# Patient Record
Sex: FEMALE | Race: WHITE | NOT HISPANIC OR LATINO | Employment: OTHER | URBAN - METROPOLITAN AREA
[De-identification: names, ages, dates, MRNs, and addresses within clinical notes are randomized per-mention and may not be internally consistent; named-entity substitution may affect disease eponyms.]

---

## 2017-07-27 ENCOUNTER — APPOINTMENT (OUTPATIENT)
Dept: GENERAL RADIOLOGY | Facility: HOSPITAL | Age: 82
End: 2017-07-27

## 2017-07-27 ENCOUNTER — HOSPITAL ENCOUNTER (OUTPATIENT)
Facility: HOSPITAL | Age: 82
Setting detail: OBSERVATION
Discharge: HOME | End: 2017-07-30
Attending: FAMILY MEDICINE | Admitting: HOSPITALIST

## 2017-07-27 DIAGNOSIS — I50.21 ACUTE SYSTOLIC HEART FAILURE (HCC): Primary | ICD-10-CM

## 2017-07-27 PROBLEM — R06.01 ORTHOPNEA: Status: ACTIVE | Noted: 2017-07-27

## 2017-07-27 PROBLEM — N18.30 CKD (CHRONIC KIDNEY DISEASE) STAGE 3, GFR 30-59 ML/MIN (HCC): Chronic | Status: ACTIVE | Noted: 2017-07-27

## 2017-07-27 PROBLEM — I44.7 BUNDLE BRANCH BLOCK, LEFT: Chronic | Status: ACTIVE | Noted: 2017-07-27

## 2017-07-27 PROBLEM — I10 ESSENTIAL HYPERTENSION: Chronic | Status: ACTIVE | Noted: 2017-07-27

## 2017-07-27 PROBLEM — E03.9 HYPOTHYROIDISM: Status: ACTIVE | Noted: 2017-07-27

## 2017-07-27 PROBLEM — I50.9 ACUTE HEART FAILURE (HCC): Status: ACTIVE | Noted: 2017-07-27

## 2017-07-27 LAB
ALBUMIN SERPL-MCNC: 4.2 G/DL (ref 3.2–4.8)
ALBUMIN/GLOB SERPL: 1.5 G/DL (ref 1.5–2.5)
ALP SERPL-CCNC: 73 U/L (ref 25–100)
ALT SERPL W P-5'-P-CCNC: 20 U/L (ref 7–40)
ANION GAP SERPL CALCULATED.3IONS-SCNC: 8 MMOL/L (ref 3–11)
AST SERPL-CCNC: 29 U/L (ref 0–33)
BILIRUB SERPL-MCNC: 2.2 MG/DL (ref 0.3–1.2)
BILIRUB UR QL STRIP: NEGATIVE
BNP SERPL-MCNC: 1067 PG/ML (ref 0–100)
BUN BLD-MCNC: 15 MG/DL (ref 9–23)
BUN/CREAT SERPL: 13.6 (ref 7–25)
CALCIUM SPEC-SCNC: 9.2 MG/DL (ref 8.7–10.4)
CHLORIDE SERPL-SCNC: 106 MMOL/L (ref 99–109)
CLARITY UR: CLEAR
CO2 SERPL-SCNC: 26 MMOL/L (ref 20–31)
COLOR UR: YELLOW
CREAT BLD-MCNC: 1.1 MG/DL (ref 0.6–1.3)
GFR SERPL CREATININE-BSD FRML MDRD: 48 ML/MIN/1.73
GLOBULIN UR ELPH-MCNC: 2.8 GM/DL
GLUCOSE BLD-MCNC: 99 MG/DL (ref 70–100)
GLUCOSE UR STRIP-MCNC: NEGATIVE MG/DL
HGB UR QL STRIP.AUTO: NEGATIVE
INR PPP: 1
KETONES UR QL STRIP: NEGATIVE
LEUKOCYTE ESTERASE UR QL STRIP.AUTO: NEGATIVE
NITRITE UR QL STRIP: NEGATIVE
PH UR STRIP.AUTO: 7 [PH] (ref 5–8)
POTASSIUM BLD-SCNC: 4 MMOL/L (ref 3.5–5.5)
PROCALCITONIN SERPL-MCNC: <0.05 NG/ML
PROT SERPL-MCNC: 7 G/DL (ref 5.7–8.2)
PROT UR QL STRIP: NEGATIVE
PROTHROMBIN TIME: 10.9 SECONDS (ref 9.6–11.5)
SODIUM BLD-SCNC: 140 MMOL/L (ref 132–146)
SP GR UR STRIP: <=1.005 (ref 1–1.03)
TROPONIN I SERPL-MCNC: 0.01 NG/ML
TROPONIN I SERPL-MCNC: 0.01 NG/ML
UROBILINOGEN UR QL STRIP: NORMAL

## 2017-07-27 PROCEDURE — 81003 URINALYSIS AUTO W/O SCOPE: CPT | Performed by: FAMILY MEDICINE

## 2017-07-27 PROCEDURE — 93010 ELECTROCARDIOGRAM REPORT: CPT | Performed by: INTERNAL MEDICINE

## 2017-07-27 PROCEDURE — 71010 HC CHEST PA OR AP: CPT

## 2017-07-27 PROCEDURE — G0378 HOSPITAL OBSERVATION PER HR: HCPCS

## 2017-07-27 PROCEDURE — 84145 PROCALCITONIN (PCT): CPT | Performed by: FAMILY MEDICINE

## 2017-07-27 PROCEDURE — 99222 1ST HOSP IP/OBS MODERATE 55: CPT | Performed by: FAMILY MEDICINE

## 2017-07-27 PROCEDURE — 80053 COMPREHEN METABOLIC PANEL: CPT | Performed by: FAMILY MEDICINE

## 2017-07-27 PROCEDURE — 83880 ASSAY OF NATRIURETIC PEPTIDE: CPT | Performed by: FAMILY MEDICINE

## 2017-07-27 PROCEDURE — 84484 ASSAY OF TROPONIN QUANT: CPT | Performed by: FAMILY MEDICINE

## 2017-07-27 PROCEDURE — 96374 THER/PROPH/DIAG INJ IV PUSH: CPT

## 2017-07-27 PROCEDURE — 93005 ELECTROCARDIOGRAM TRACING: CPT | Performed by: FAMILY MEDICINE

## 2017-07-27 PROCEDURE — 25010000002 FUROSEMIDE PER 20 MG: Performed by: FAMILY MEDICINE

## 2017-07-27 PROCEDURE — 85610 PROTHROMBIN TIME: CPT | Performed by: FAMILY MEDICINE

## 2017-07-27 RX ORDER — PROMETHAZINE HYDROCHLORIDE 25 MG/ML
12.5 INJECTION, SOLUTION INTRAMUSCULAR; INTRAVENOUS EVERY 6 HOURS PRN
Status: DISCONTINUED | OUTPATIENT
Start: 2017-07-27 | End: 2017-07-30 | Stop reason: HOSPADM

## 2017-07-27 RX ORDER — ESTRADIOL 1 MG/1
1 TABLET ORAL DAILY
COMMUNITY

## 2017-07-27 RX ORDER — AMLODIPINE BESYLATE 10 MG/1
10 TABLET ORAL DAILY
COMMUNITY
End: 2017-07-30 | Stop reason: HOSPADM

## 2017-07-27 RX ORDER — BISACODYL 10 MG
10 SUPPOSITORY, RECTAL RECTAL DAILY PRN
Status: DISCONTINUED | OUTPATIENT
Start: 2017-07-27 | End: 2017-07-30 | Stop reason: HOSPADM

## 2017-07-27 RX ORDER — PROMETHAZINE HYDROCHLORIDE 12.5 MG/1
12.5 TABLET ORAL EVERY 6 HOURS PRN
Status: DISCONTINUED | OUTPATIENT
Start: 2017-07-27 | End: 2017-07-30 | Stop reason: HOSPADM

## 2017-07-27 RX ORDER — HYOSCYAMINE SULFATE EXTENDED-RELEASE 0.38 MG/1
375 TABLET ORAL EVERY 12 HOURS PRN
Status: DISCONTINUED | OUTPATIENT
Start: 2017-07-27 | End: 2017-07-30 | Stop reason: HOSPADM

## 2017-07-27 RX ORDER — LABETALOL 200 MG/1
200 TABLET, FILM COATED ORAL 2 TIMES DAILY
COMMUNITY
End: 2017-07-30 | Stop reason: HOSPADM

## 2017-07-27 RX ORDER — LEVOTHYROXINE SODIUM 112 UG/1
112 TABLET ORAL
Status: DISCONTINUED | OUTPATIENT
Start: 2017-07-28 | End: 2017-07-30 | Stop reason: HOSPADM

## 2017-07-27 RX ORDER — ESTRADIOL 0.5 MG/1
1 TABLET ORAL DAILY
Status: DISCONTINUED | OUTPATIENT
Start: 2017-07-27 | End: 2017-07-30 | Stop reason: HOSPADM

## 2017-07-27 RX ORDER — POTASSIUM CHLORIDE 750 MG/1
10 TABLET, FILM COATED, EXTENDED RELEASE ORAL 2 TIMES DAILY
COMMUNITY
End: 2017-08-04 | Stop reason: DRUGHIGH

## 2017-07-27 RX ORDER — CALCIUM CARBONATE 200(500)MG
2 TABLET,CHEWABLE ORAL 2 TIMES DAILY PRN
Status: DISCONTINUED | OUTPATIENT
Start: 2017-07-27 | End: 2017-07-30 | Stop reason: HOSPADM

## 2017-07-27 RX ORDER — BISACODYL 5 MG/1
5 TABLET, DELAYED RELEASE ORAL DAILY PRN
Status: DISCONTINUED | OUTPATIENT
Start: 2017-07-27 | End: 2017-07-30 | Stop reason: HOSPADM

## 2017-07-27 RX ORDER — CARVEDILOL 3.12 MG/1
3.12 TABLET ORAL 2 TIMES DAILY WITH MEALS
Status: DISCONTINUED | OUTPATIENT
Start: 2017-07-27 | End: 2017-07-29

## 2017-07-27 RX ORDER — TRIAMTERENE AND HYDROCHLOROTHIAZIDE 37.5; 25 MG/1; MG/1
1 TABLET ORAL DAILY
COMMUNITY
End: 2017-09-08 | Stop reason: HOSPADM

## 2017-07-27 RX ORDER — LEVOTHYROXINE SODIUM 112 UG/1
88 TABLET ORAL DAILY
COMMUNITY
End: 2019-03-27

## 2017-07-27 RX ORDER — FUROSEMIDE 10 MG/ML
20 INJECTION INTRAMUSCULAR; INTRAVENOUS ONCE
Status: COMPLETED | OUTPATIENT
Start: 2017-07-27 | End: 2017-07-27

## 2017-07-27 RX ORDER — HYOSCYAMINE SULFATE EXTENDED-RELEASE 0.38 MG/1
0.38 TABLET ORAL DAILY
Status: ON HOLD | COMMUNITY
End: 2017-09-06

## 2017-07-27 RX ORDER — ACETAMINOPHEN 325 MG/1
650 TABLET ORAL EVERY 6 HOURS PRN
Status: DISCONTINUED | OUTPATIENT
Start: 2017-07-27 | End: 2017-07-30 | Stop reason: HOSPADM

## 2017-07-27 RX ORDER — PROMETHAZINE HYDROCHLORIDE 12.5 MG/1
12.5 SUPPOSITORY RECTAL EVERY 6 HOURS PRN
Status: DISCONTINUED | OUTPATIENT
Start: 2017-07-27 | End: 2017-07-30 | Stop reason: HOSPADM

## 2017-07-27 RX ORDER — SODIUM CHLORIDE 0.9 % (FLUSH) 0.9 %
1-10 SYRINGE (ML) INJECTION AS NEEDED
Status: DISCONTINUED | OUTPATIENT
Start: 2017-07-27 | End: 2017-07-30 | Stop reason: HOSPADM

## 2017-07-27 RX ORDER — AMLODIPINE BESYLATE 10 MG/1
10 TABLET ORAL DAILY
Status: DISCONTINUED | OUTPATIENT
Start: 2017-07-27 | End: 2017-07-29

## 2017-07-27 RX ADMIN — ESTRADIOL 1 MG: 0.5 TABLET ORAL at 17:59

## 2017-07-27 RX ADMIN — CARVEDILOL 3.12 MG: 3.12 TABLET, FILM COATED ORAL at 17:52

## 2017-07-27 RX ADMIN — FUROSEMIDE 20 MG: 10 INJECTION, SOLUTION INTRAMUSCULAR; INTRAVENOUS at 17:52

## 2017-07-27 RX ADMIN — AMLODIPINE BESYLATE 10 MG: 10 TABLET ORAL at 17:52

## 2017-07-28 ENCOUNTER — APPOINTMENT (OUTPATIENT)
Dept: CARDIOLOGY | Facility: HOSPITAL | Age: 82
End: 2017-07-28
Attending: FAMILY MEDICINE

## 2017-07-28 LAB
ANION GAP SERPL CALCULATED.3IONS-SCNC: 5 MMOL/L (ref 3–11)
ARTICHOKE IGE QN: 63 MG/DL (ref 0–130)
BASOPHILS # BLD AUTO: 0.07 10*3/MM3 (ref 0–0.2)
BASOPHILS NFR BLD AUTO: 0.9 % (ref 0–1)
BH CV ECHO MEAS - AO MAX PG (FULL): 9.1 MMHG
BH CV ECHO MEAS - AO MAX PG: 12.5 MMHG
BH CV ECHO MEAS - AO MEAN PG (FULL): 4 MMHG
BH CV ECHO MEAS - AO MEAN PG: 6 MMHG
BH CV ECHO MEAS - AO ROOT AREA (BSA CORRECTED): 1.7
BH CV ECHO MEAS - AO ROOT AREA: 5.3 CM^2
BH CV ECHO MEAS - AO ROOT DIAM: 2.6 CM
BH CV ECHO MEAS - AO V2 MAX: 176.5 CM/SEC
BH CV ECHO MEAS - AO V2 MEAN: 114 CM/SEC
BH CV ECHO MEAS - AO V2 VTI: 41.8 CM
BH CV ECHO MEAS - AVA(I,A): 1.7 CM^2
BH CV ECHO MEAS - AVA(I,D): 1.7 CM^2
BH CV ECHO MEAS - AVA(V,A): 1.7 CM^2
BH CV ECHO MEAS - AVA(V,D): 1.7 CM^2
BH CV ECHO MEAS - BSA(HAYCOCK): 1.5 M^2
BH CV ECHO MEAS - BSA: 1.5 M^2
BH CV ECHO MEAS - BZI_BMI: 21.9 KILOGRAMS/M^2
BH CV ECHO MEAS - BZI_METRIC_HEIGHT: 157.5 CM
BH CV ECHO MEAS - BZI_METRIC_WEIGHT: 54.4 KG
BH CV ECHO MEAS - CONTRAST EF 4CH: 30.4 ML/M^2
BH CV ECHO MEAS - EDV(CUBED): 139.8 ML
BH CV ECHO MEAS - EDV(MOD-SP4): 138 ML
BH CV ECHO MEAS - EDV(TEICH): 128.9 ML
BH CV ECHO MEAS - EF(CUBED): 41.9 %
BH CV ECHO MEAS - EF(MOD-SP4): 30.4 %
BH CV ECHO MEAS - EF(TEICH): 34.5 %
BH CV ECHO MEAS - ESV(CUBED): 81.2 ML
BH CV ECHO MEAS - ESV(MOD-SP4): 96 ML
BH CV ECHO MEAS - ESV(TEICH): 84.4 ML
BH CV ECHO MEAS - FS: 16.6 %
BH CV ECHO MEAS - IVS/LVPW: 0.93
BH CV ECHO MEAS - IVSD: 0.88 CM
BH CV ECHO MEAS - LA DIMENSION: 4.1 CM
BH CV ECHO MEAS - LA/AO: 1.6
BH CV ECHO MEAS - LV DIASTOLIC VOL/BSA (35-75): 89.7 ML/M^2
BH CV ECHO MEAS - LV MASS(C)D: 171.1 GRAMS
BH CV ECHO MEAS - LV MASS(C)DI: 111.2 GRAMS/M^2
BH CV ECHO MEAS - LV MAX PG: 3.4 MMHG
BH CV ECHO MEAS - LV MEAN PG: 2 MMHG
BH CV ECHO MEAS - LV SYSTOLIC VOL/BSA (12-30): 62.4 ML/M^2
BH CV ECHO MEAS - LV V1 MAX: 92.8 CM/SEC
BH CV ECHO MEAS - LV V1 MEAN: 57.9 CM/SEC
BH CV ECHO MEAS - LV V1 VTI: 23.1 CM
BH CV ECHO MEAS - LVIDD: 5.2 CM
BH CV ECHO MEAS - LVIDS: 4.3 CM
BH CV ECHO MEAS - LVLD AP4: 7.6 CM
BH CV ECHO MEAS - LVLS AP4: 7.4 CM
BH CV ECHO MEAS - LVOT AREA (M): 3.1 CM^2
BH CV ECHO MEAS - LVOT AREA: 3.1 CM^2
BH CV ECHO MEAS - LVOT DIAM: 2 CM
BH CV ECHO MEAS - LVPWD: 0.94 CM
BH CV ECHO MEAS - MR ALIAS VEL: 33.5 CM/SEC
BH CV ECHO MEAS - MR ERO: 0.19 CM^2
BH CV ECHO MEAS - MR FLOW RATE: 103.1 CM^3/SEC
BH CV ECHO MEAS - MR MAX PG: 114.5 MMHG
BH CV ECHO MEAS - MR MAX VEL: 535 CM/SEC
BH CV ECHO MEAS - MR MEAN PG: 73 MMHG
BH CV ECHO MEAS - MR MEAN VEL: 406 CM/SEC
BH CV ECHO MEAS - MR PISA RADIUS: 0.7 CM
BH CV ECHO MEAS - MR PISA: 3.1 CM^2
BH CV ECHO MEAS - MR VOLUME: 41.1 ML
BH CV ECHO MEAS - MR VTI: 213 CM
BH CV ECHO MEAS - MV A MAX VEL: 77.9 CM/SEC
BH CV ECHO MEAS - MV DEC TIME: 0.13 SEC
BH CV ECHO MEAS - MV E MAX VEL: 116 CM/SEC
BH CV ECHO MEAS - MV E/A: 1.5
BH CV ECHO MEAS - PA ACC SLOPE: 860 CM/SEC^2
BH CV ECHO MEAS - PA ACC TIME: 0.08 SEC
BH CV ECHO MEAS - PA MAX PG: 3.3 MMHG
BH CV ECHO MEAS - PA PR(ACCEL): 44.4 MMHG
BH CV ECHO MEAS - PA V2 MAX: 89.6 CM/SEC
BH CV ECHO MEAS - PI END-D VEL: 124 CM/SEC
BH CV ECHO MEAS - RAP SYSTOLE: 8 MMHG
BH CV ECHO MEAS - RVDD: 1.5 CM
BH CV ECHO MEAS - RVSP: 45 MMHG
BH CV ECHO MEAS - SI(AO): 144.3 ML/M^2
BH CV ECHO MEAS - SI(CUBED): 38.1 ML/M^2
BH CV ECHO MEAS - SI(LVOT): 47.2 ML/M^2
BH CV ECHO MEAS - SI(MOD-SP4): 27.3 ML/M^2
BH CV ECHO MEAS - SI(TEICH): 28.9 ML/M^2
BH CV ECHO MEAS - SV(AO): 221.9 ML
BH CV ECHO MEAS - SV(CUBED): 58.6 ML
BH CV ECHO MEAS - SV(LVOT): 72.6 ML
BH CV ECHO MEAS - SV(MOD-SP4): 42 ML
BH CV ECHO MEAS - SV(TEICH): 44.5 ML
BH CV ECHO MEAS - TR MAX VEL: 305 CM/SEC
BH CV XLRA - RV BASE: 3.3 CM
BH CV XLRA - RV LENGTH: 4.9 CM
BH CV XLRA - RV MID: 2.7 CM
BUN BLD-MCNC: 16 MG/DL (ref 9–23)
BUN/CREAT SERPL: 14.5 (ref 7–25)
CALCIUM SPEC-SCNC: 8.6 MG/DL (ref 8.7–10.4)
CHLORIDE SERPL-SCNC: 108 MMOL/L (ref 99–109)
CHOLEST SERPL-MCNC: 158 MG/DL (ref 0–200)
CO2 SERPL-SCNC: 26 MMOL/L (ref 20–31)
CREAT BLD-MCNC: 1.1 MG/DL (ref 0.6–1.3)
DEPRECATED RDW RBC AUTO: 51.8 FL (ref 37–54)
EOSINOPHIL # BLD AUTO: 0.26 10*3/MM3 (ref 0–0.3)
EOSINOPHIL NFR BLD AUTO: 3.4 % (ref 0–3)
ERYTHROCYTE [DISTWIDTH] IN BLOOD BY AUTOMATED COUNT: 13.9 % (ref 11.3–14.5)
GFR SERPL CREATININE-BSD FRML MDRD: 48 ML/MIN/1.73
GLUCOSE BLD-MCNC: 95 MG/DL (ref 70–100)
HCT VFR BLD AUTO: 34.6 % (ref 34.5–44)
HDLC SERPL-MCNC: 72 MG/DL (ref 40–60)
HGB BLD-MCNC: 11.6 G/DL (ref 11.5–15.5)
IMM GRANULOCYTES # BLD: 0.01 10*3/MM3 (ref 0–0.03)
IMM GRANULOCYTES NFR BLD: 0.1 % (ref 0–0.6)
LYMPHOCYTES # BLD AUTO: 1.96 10*3/MM3 (ref 0.6–4.8)
LYMPHOCYTES NFR BLD AUTO: 25.7 % (ref 24–44)
MCH RBC QN AUTO: 34.2 PG (ref 27–31)
MCHC RBC AUTO-ENTMCNC: 33.5 G/DL (ref 32–36)
MCV RBC AUTO: 102.1 FL (ref 80–99)
MONOCYTES # BLD AUTO: 0.81 10*3/MM3 (ref 0–1)
MONOCYTES NFR BLD AUTO: 10.6 % (ref 0–12)
NEUTROPHILS # BLD AUTO: 4.52 10*3/MM3 (ref 1.5–8.3)
NEUTROPHILS NFR BLD AUTO: 59.3 % (ref 41–71)
PLATELET # BLD AUTO: 164 10*3/MM3 (ref 150–450)
PMV BLD AUTO: 10.1 FL (ref 6–12)
POTASSIUM BLD-SCNC: 3.4 MMOL/L (ref 3.5–5.5)
RBC # BLD AUTO: 3.39 10*6/MM3 (ref 3.89–5.14)
SODIUM BLD-SCNC: 139 MMOL/L (ref 132–146)
T4 FREE SERPL-MCNC: 1.11 NG/DL (ref 0.89–1.76)
TRIGL SERPL-MCNC: 71 MG/DL (ref 0–150)
TROPONIN I SERPL-MCNC: 0.01 NG/ML
TSH SERPL DL<=0.05 MIU/L-ACNC: 7.12 MIU/ML (ref 0.35–5.35)
WBC NRBC COR # BLD: 7.63 10*3/MM3 (ref 3.5–10.8)

## 2017-07-28 PROCEDURE — 96376 TX/PRO/DX INJ SAME DRUG ADON: CPT

## 2017-07-28 PROCEDURE — 93005 ELECTROCARDIOGRAM TRACING: CPT | Performed by: FAMILY MEDICINE

## 2017-07-28 PROCEDURE — C8929 TTE W OR WO FOL WCON,DOPPLER: HCPCS

## 2017-07-28 PROCEDURE — G0378 HOSPITAL OBSERVATION PER HR: HCPCS

## 2017-07-28 PROCEDURE — 25010000002 ENOXAPARIN PER 10 MG: Performed by: FAMILY MEDICINE

## 2017-07-28 PROCEDURE — 96372 THER/PROPH/DIAG INJ SC/IM: CPT

## 2017-07-28 PROCEDURE — 25010000002 FUROSEMIDE PER 20 MG: Performed by: INTERNAL MEDICINE

## 2017-07-28 PROCEDURE — 25010000002 SULFUR HEXAFLUORIDE MICROSPH 60.7-25 MG RECONSTITUTED SUSPENSION: Performed by: HOSPITALIST

## 2017-07-28 PROCEDURE — 84439 ASSAY OF FREE THYROXINE: CPT | Performed by: PHYSICIAN ASSISTANT

## 2017-07-28 PROCEDURE — 80061 LIPID PANEL: CPT | Performed by: FAMILY MEDICINE

## 2017-07-28 PROCEDURE — 84484 ASSAY OF TROPONIN QUANT: CPT | Performed by: FAMILY MEDICINE

## 2017-07-28 PROCEDURE — 93306 TTE W/DOPPLER COMPLETE: CPT | Performed by: INTERNAL MEDICINE

## 2017-07-28 PROCEDURE — 99204 OFFICE O/P NEW MOD 45 MIN: CPT | Performed by: INTERNAL MEDICINE

## 2017-07-28 PROCEDURE — 85025 COMPLETE CBC W/AUTO DIFF WBC: CPT | Performed by: FAMILY MEDICINE

## 2017-07-28 PROCEDURE — 80048 BASIC METABOLIC PNL TOTAL CA: CPT | Performed by: FAMILY MEDICINE

## 2017-07-28 PROCEDURE — 84443 ASSAY THYROID STIM HORMONE: CPT | Performed by: PHYSICIAN ASSISTANT

## 2017-07-28 PROCEDURE — 99226 PR SBSQ OBSERVATION CARE/DAY 35 MINUTES: CPT | Performed by: HOSPITALIST

## 2017-07-28 PROCEDURE — 93010 ELECTROCARDIOGRAM REPORT: CPT | Performed by: INTERNAL MEDICINE

## 2017-07-28 RX ORDER — POTASSIUM CHLORIDE 750 MG/1
40 CAPSULE, EXTENDED RELEASE ORAL AS NEEDED
Status: DISCONTINUED | OUTPATIENT
Start: 2017-07-28 | End: 2017-07-30 | Stop reason: HOSPADM

## 2017-07-28 RX ORDER — FUROSEMIDE 10 MG/ML
40 INJECTION INTRAMUSCULAR; INTRAVENOUS DAILY
Status: DISCONTINUED | OUTPATIENT
Start: 2017-07-28 | End: 2017-07-29

## 2017-07-28 RX ORDER — POTASSIUM CHLORIDE 1.5 G/1.77G
40 POWDER, FOR SOLUTION ORAL AS NEEDED
Status: DISCONTINUED | OUTPATIENT
Start: 2017-07-28 | End: 2017-07-30 | Stop reason: HOSPADM

## 2017-07-28 RX ORDER — LOPERAMIDE HYDROCHLORIDE 2 MG/1
2 CAPSULE ORAL 4 TIMES DAILY PRN
Status: DISCONTINUED | OUTPATIENT
Start: 2017-07-28 | End: 2017-07-30 | Stop reason: HOSPADM

## 2017-07-28 RX ORDER — POTASSIUM CHLORIDE 7.45 MG/ML
10 INJECTION INTRAVENOUS
Status: DISCONTINUED | OUTPATIENT
Start: 2017-07-28 | End: 2017-07-30 | Stop reason: HOSPADM

## 2017-07-28 RX ADMIN — CARVEDILOL 3.12 MG: 3.12 TABLET, FILM COATED ORAL at 09:40

## 2017-07-28 RX ADMIN — LOPERAMIDE HYDROCHLORIDE 2 MG: 2 CAPSULE ORAL at 14:49

## 2017-07-28 RX ADMIN — ESTRADIOL 1 MG: 0.5 TABLET ORAL at 09:40

## 2017-07-28 RX ADMIN — FUROSEMIDE 40 MG: 10 INJECTION, SOLUTION INTRAMUSCULAR; INTRAVENOUS at 14:49

## 2017-07-28 RX ADMIN — SULFUR HEXAFLUORIDE 2 ML: KIT at 11:25

## 2017-07-28 RX ADMIN — ACETAMINOPHEN 650 MG: 325 TABLET, FILM COATED ORAL at 22:33

## 2017-07-28 RX ADMIN — AMLODIPINE BESYLATE 10 MG: 10 TABLET ORAL at 09:40

## 2017-07-28 RX ADMIN — LEVOTHYROXINE SODIUM 112 MCG: 112 TABLET ORAL at 09:40

## 2017-07-28 RX ADMIN — ENOXAPARIN SODIUM 40 MG: 40 INJECTION SUBCUTANEOUS at 09:40

## 2017-07-28 RX ADMIN — POTASSIUM CHLORIDE 40 MEQ: 750 CAPSULE, EXTENDED RELEASE ORAL at 17:45

## 2017-07-28 RX ADMIN — CARVEDILOL 3.12 MG: 3.12 TABLET, FILM COATED ORAL at 17:45

## 2017-07-29 ENCOUNTER — APPOINTMENT (OUTPATIENT)
Dept: GENERAL RADIOLOGY | Facility: HOSPITAL | Age: 82
End: 2017-07-29

## 2017-07-29 PROBLEM — I50.21 ACUTE SYSTOLIC HEART FAILURE (HCC): Status: ACTIVE | Noted: 2017-07-27

## 2017-07-29 LAB
ANION GAP SERPL CALCULATED.3IONS-SCNC: 6 MMOL/L (ref 3–11)
BNP SERPL-MCNC: 283 PG/ML (ref 0–100)
BUN BLD-MCNC: 21 MG/DL (ref 9–23)
BUN/CREAT SERPL: 17.5 (ref 7–25)
CALCIUM SPEC-SCNC: 8.9 MG/DL (ref 8.7–10.4)
CHLORIDE SERPL-SCNC: 104 MMOL/L (ref 99–109)
CO2 SERPL-SCNC: 29 MMOL/L (ref 20–31)
CREAT BLD-MCNC: 1.2 MG/DL (ref 0.6–1.3)
DEPRECATED RDW RBC AUTO: 52.5 FL (ref 37–54)
ERYTHROCYTE [DISTWIDTH] IN BLOOD BY AUTOMATED COUNT: 14 % (ref 11.3–14.5)
GFR SERPL CREATININE-BSD FRML MDRD: 43 ML/MIN/1.73
GLUCOSE BLD-MCNC: 94 MG/DL (ref 70–100)
HCT VFR BLD AUTO: 38.8 % (ref 34.5–44)
HGB BLD-MCNC: 12.5 G/DL (ref 11.5–15.5)
MCH RBC QN AUTO: 33 PG (ref 27–31)
MCHC RBC AUTO-ENTMCNC: 32.2 G/DL (ref 32–36)
MCV RBC AUTO: 102.4 FL (ref 80–99)
PLATELET # BLD AUTO: 194 10*3/MM3 (ref 150–450)
PMV BLD AUTO: 10.6 FL (ref 6–12)
POTASSIUM BLD-SCNC: 3.7 MMOL/L (ref 3.5–5.5)
RBC # BLD AUTO: 3.79 10*6/MM3 (ref 3.89–5.14)
SODIUM BLD-SCNC: 139 MMOL/L (ref 132–146)
WBC NRBC COR # BLD: 7.24 10*3/MM3 (ref 3.5–10.8)

## 2017-07-29 PROCEDURE — 25010000002 FUROSEMIDE PER 20 MG: Performed by: INTERNAL MEDICINE

## 2017-07-29 PROCEDURE — 99213 OFFICE O/P EST LOW 20 MIN: CPT | Performed by: INTERNAL MEDICINE

## 2017-07-29 PROCEDURE — 25010000002 HEPARIN (PORCINE) PER 1000 UNITS: Performed by: INTERNAL MEDICINE

## 2017-07-29 PROCEDURE — 25010000002 ENOXAPARIN PER 10 MG

## 2017-07-29 PROCEDURE — G0378 HOSPITAL OBSERVATION PER HR: HCPCS

## 2017-07-29 PROCEDURE — 0 IOPAMIDOL PER 1 ML: Performed by: INTERNAL MEDICINE

## 2017-07-29 PROCEDURE — 25010000002 MIDAZOLAM PER 1 MG: Performed by: INTERNAL MEDICINE

## 2017-07-29 PROCEDURE — 80048 BASIC METABOLIC PNL TOTAL CA: CPT | Performed by: HOSPITALIST

## 2017-07-29 PROCEDURE — 93458 L HRT ARTERY/VENTRICLE ANGIO: CPT | Performed by: INTERNAL MEDICINE

## 2017-07-29 PROCEDURE — 25010000002 FENTANYL CITRATE (PF) 100 MCG/2ML SOLUTION: Performed by: INTERNAL MEDICINE

## 2017-07-29 PROCEDURE — 96376 TX/PRO/DX INJ SAME DRUG ADON: CPT

## 2017-07-29 PROCEDURE — 99226 PR SBSQ OBSERVATION CARE/DAY 35 MINUTES: CPT | Performed by: HOSPITALIST

## 2017-07-29 PROCEDURE — 83880 ASSAY OF NATRIURETIC PEPTIDE: CPT | Performed by: HOSPITALIST

## 2017-07-29 PROCEDURE — 71020 HC CHEST PA AND LATERAL: CPT

## 2017-07-29 PROCEDURE — C1769 GUIDE WIRE: HCPCS | Performed by: INTERNAL MEDICINE

## 2017-07-29 PROCEDURE — C1894 INTRO/SHEATH, NON-LASER: HCPCS | Performed by: INTERNAL MEDICINE

## 2017-07-29 PROCEDURE — 85027 COMPLETE CBC AUTOMATED: CPT | Performed by: HOSPITALIST

## 2017-07-29 RX ORDER — CARVEDILOL 6.25 MG/1
6.25 TABLET ORAL 2 TIMES DAILY WITH MEALS
Status: DISCONTINUED | OUTPATIENT
Start: 2017-07-29 | End: 2017-07-30 | Stop reason: HOSPADM

## 2017-07-29 RX ORDER — MIDAZOLAM HYDROCHLORIDE 1 MG/ML
INJECTION INTRAMUSCULAR; INTRAVENOUS AS NEEDED
Status: DISCONTINUED | OUTPATIENT
Start: 2017-07-29 | End: 2017-07-29 | Stop reason: HOSPADM

## 2017-07-29 RX ORDER — ACETAMINOPHEN 325 MG/1
650 TABLET ORAL EVERY 4 HOURS PRN
Status: DISCONTINUED | OUTPATIENT
Start: 2017-07-29 | End: 2017-07-30 | Stop reason: HOSPADM

## 2017-07-29 RX ORDER — LOSARTAN POTASSIUM 50 MG/1
100 TABLET ORAL
Status: DISCONTINUED | OUTPATIENT
Start: 2017-07-30 | End: 2017-07-30 | Stop reason: HOSPADM

## 2017-07-29 RX ORDER — HYDROCODONE BITARTRATE AND ACETAMINOPHEN 5; 325 MG/1; MG/1
1 TABLET ORAL EVERY 4 HOURS PRN
Status: DISCONTINUED | OUTPATIENT
Start: 2017-07-29 | End: 2017-07-30 | Stop reason: HOSPADM

## 2017-07-29 RX ORDER — LOSARTAN POTASSIUM 50 MG/1
50 TABLET ORAL
Status: DISCONTINUED | OUTPATIENT
Start: 2017-07-29 | End: 2017-07-29

## 2017-07-29 RX ORDER — FENTANYL CITRATE 50 UG/ML
INJECTION, SOLUTION INTRAMUSCULAR; INTRAVENOUS AS NEEDED
Status: DISCONTINUED | OUTPATIENT
Start: 2017-07-29 | End: 2017-07-29 | Stop reason: HOSPADM

## 2017-07-29 RX ORDER — LIDOCAINE HYDROCHLORIDE 10 MG/ML
INJECTION, SOLUTION INFILTRATION; PERINEURAL AS NEEDED
Status: DISCONTINUED | OUTPATIENT
Start: 2017-07-29 | End: 2017-07-29 | Stop reason: HOSPADM

## 2017-07-29 RX ORDER — DIPHENHYDRAMINE HYDROCHLORIDE 50 MG/ML
25 INJECTION INTRAMUSCULAR; INTRAVENOUS EVERY 6 HOURS PRN
Status: DISCONTINUED | OUTPATIENT
Start: 2017-07-29 | End: 2017-07-30 | Stop reason: HOSPADM

## 2017-07-29 RX ORDER — FAMOTIDINE 20 MG
1000 TABLET ORAL DAILY
COMMUNITY

## 2017-07-29 RX ORDER — SODIUM CHLORIDE 9 MG/ML
INJECTION, SOLUTION INTRAVENOUS CONTINUOUS PRN
Status: DISCONTINUED | OUTPATIENT
Start: 2017-07-29 | End: 2017-07-29 | Stop reason: HOSPADM

## 2017-07-29 RX ORDER — FUROSEMIDE 40 MG/1
40 TABLET ORAL DAILY
Status: DISCONTINUED | OUTPATIENT
Start: 2017-07-29 | End: 2017-07-30 | Stop reason: HOSPADM

## 2017-07-29 RX ADMIN — ESTRADIOL 1 MG: 0.5 TABLET ORAL at 10:50

## 2017-07-29 RX ADMIN — LEVOTHYROXINE SODIUM 112 MCG: 112 TABLET ORAL at 05:45

## 2017-07-29 RX ADMIN — LOSARTAN POTASSIUM 50 MG: 50 TABLET ORAL at 10:52

## 2017-07-29 RX ADMIN — ENOXAPARIN SODIUM 30 MG: 30 INJECTION SUBCUTANEOUS at 05:45

## 2017-07-29 RX ADMIN — CARVEDILOL 6.25 MG: 6.25 TABLET, FILM COATED ORAL at 10:52

## 2017-07-29 RX ADMIN — FUROSEMIDE 40 MG: 10 INJECTION, SOLUTION INTRAMUSCULAR; INTRAVENOUS at 10:52

## 2017-07-29 RX ADMIN — ACETAMINOPHEN 650 MG: 325 TABLET, FILM COATED ORAL at 22:34

## 2017-07-29 RX ADMIN — CARVEDILOL 6.25 MG: 6.25 TABLET, FILM COATED ORAL at 17:00

## 2017-07-30 VITALS
SYSTOLIC BLOOD PRESSURE: 165 MMHG | RESPIRATION RATE: 18 BRPM | BODY MASS INDEX: 21.16 KG/M2 | HEART RATE: 70 BPM | WEIGHT: 115 LBS | HEIGHT: 62 IN | TEMPERATURE: 98.2 F | DIASTOLIC BLOOD PRESSURE: 84 MMHG | OXYGEN SATURATION: 93 %

## 2017-07-30 PROBLEM — I50.21 ACUTE SYSTOLIC (CONGESTIVE) HEART FAILURE (HCC): Status: ACTIVE | Noted: 2017-07-27

## 2017-07-30 PROBLEM — I50.21 ACUTE SYSTOLIC HEART FAILURE (HCC): Status: ACTIVE | Noted: 2017-07-27

## 2017-07-30 PROBLEM — I51.81 TAKOTSUBO CARDIOMYOPATHY: Status: ACTIVE | Noted: 2017-07-30

## 2017-07-30 LAB
ANION GAP SERPL CALCULATED.3IONS-SCNC: 6 MMOL/L (ref 3–11)
ARTICHOKE IGE QN: 55 MG/DL (ref 0–130)
BUN BLD-MCNC: 21 MG/DL (ref 9–23)
BUN/CREAT SERPL: 16.2 (ref 7–25)
CALCIUM SPEC-SCNC: 8.9 MG/DL (ref 8.7–10.4)
CHLORIDE SERPL-SCNC: 102 MMOL/L (ref 99–109)
CHOLEST SERPL-MCNC: 162 MG/DL (ref 0–200)
CO2 SERPL-SCNC: 31 MMOL/L (ref 20–31)
CREAT BLD-MCNC: 1.3 MG/DL (ref 0.6–1.3)
DEPRECATED RDW RBC AUTO: 49.4 FL (ref 37–54)
ERYTHROCYTE [DISTWIDTH] IN BLOOD BY AUTOMATED COUNT: 13.7 % (ref 11.3–14.5)
GFR SERPL CREATININE-BSD FRML MDRD: 39 ML/MIN/1.73
GLUCOSE BLD-MCNC: 94 MG/DL (ref 70–100)
HBA1C MFR BLD: 4.8 % (ref 4.8–5.6)
HCT VFR BLD AUTO: 36.3 % (ref 34.5–44)
HDLC SERPL-MCNC: 68 MG/DL (ref 40–60)
HGB BLD-MCNC: 12.1 G/DL (ref 11.5–15.5)
MCH RBC QN AUTO: 33.1 PG (ref 27–31)
MCHC RBC AUTO-ENTMCNC: 33.3 G/DL (ref 32–36)
MCV RBC AUTO: 99.2 FL (ref 80–99)
PLATELET # BLD AUTO: 195 10*3/MM3 (ref 150–450)
PMV BLD AUTO: 10.3 FL (ref 6–12)
POTASSIUM BLD-SCNC: 3.6 MMOL/L (ref 3.5–5.5)
RBC # BLD AUTO: 3.66 10*6/MM3 (ref 3.89–5.14)
SODIUM BLD-SCNC: 139 MMOL/L (ref 132–146)
TRIGL SERPL-MCNC: 165 MG/DL (ref 0–150)
WBC NRBC COR # BLD: 6.8 10*3/MM3 (ref 3.5–10.8)

## 2017-07-30 PROCEDURE — 80048 BASIC METABOLIC PNL TOTAL CA: CPT | Performed by: INTERNAL MEDICINE

## 2017-07-30 PROCEDURE — 99213 OFFICE O/P EST LOW 20 MIN: CPT | Performed by: INTERNAL MEDICINE

## 2017-07-30 PROCEDURE — G0378 HOSPITAL OBSERVATION PER HR: HCPCS

## 2017-07-30 PROCEDURE — 80061 LIPID PANEL: CPT | Performed by: INTERNAL MEDICINE

## 2017-07-30 PROCEDURE — 25010000002 ENOXAPARIN PER 10 MG

## 2017-07-30 PROCEDURE — 85027 COMPLETE CBC AUTOMATED: CPT | Performed by: INTERNAL MEDICINE

## 2017-07-30 PROCEDURE — 83036 HEMOGLOBIN GLYCOSYLATED A1C: CPT | Performed by: INTERNAL MEDICINE

## 2017-07-30 PROCEDURE — 99239 HOSP IP/OBS DSCHRG MGMT >30: CPT | Performed by: HOSPITALIST

## 2017-07-30 RX ORDER — CARVEDILOL 6.25 MG/1
6.25 TABLET ORAL 2 TIMES DAILY WITH MEALS
Qty: 60 TABLET | Refills: 11 | Status: SHIPPED | OUTPATIENT
Start: 2017-07-30 | End: 2017-09-24 | Stop reason: HOSPADM

## 2017-07-30 RX ORDER — LOSARTAN POTASSIUM 100 MG/1
100 TABLET ORAL
Qty: 30 TABLET | Refills: 11 | Status: SHIPPED | OUTPATIENT
Start: 2017-07-30 | End: 2017-09-24 | Stop reason: HOSPADM

## 2017-07-30 RX ADMIN — CARVEDILOL 6.25 MG: 6.25 TABLET, FILM COATED ORAL at 08:11

## 2017-07-30 RX ADMIN — LEVOTHYROXINE SODIUM 112 MCG: 112 TABLET ORAL at 06:28

## 2017-07-30 RX ADMIN — POTASSIUM CHLORIDE 40 MEQ: 750 CAPSULE, EXTENDED RELEASE ORAL at 08:10

## 2017-07-30 RX ADMIN — ENOXAPARIN SODIUM 30 MG: 30 INJECTION SUBCUTANEOUS at 06:28

## 2017-07-30 RX ADMIN — ESTRADIOL 1 MG: 0.5 TABLET ORAL at 08:11

## 2017-07-30 RX ADMIN — FUROSEMIDE 40 MG: 40 TABLET ORAL at 08:11

## 2017-07-30 RX ADMIN — LOSARTAN POTASSIUM 100 MG: 50 TABLET ORAL at 08:11

## 2017-07-31 ENCOUNTER — DOCUMENTATION (OUTPATIENT)
Dept: CARDIAC REHAB | Facility: HOSPITAL | Age: 82
End: 2017-07-31

## 2017-08-04 ENCOUNTER — OFFICE VISIT (OUTPATIENT)
Dept: CARDIOLOGY | Facility: HOSPITAL | Age: 82
End: 2017-08-04

## 2017-08-04 VITALS
RESPIRATION RATE: 19 BRPM | BODY MASS INDEX: 23.36 KG/M2 | HEIGHT: 60 IN | SYSTOLIC BLOOD PRESSURE: 180 MMHG | OXYGEN SATURATION: 99 % | DIASTOLIC BLOOD PRESSURE: 80 MMHG | HEART RATE: 70 BPM | TEMPERATURE: 97.2 F | WEIGHT: 119 LBS

## 2017-08-04 DIAGNOSIS — N18.30 CKD (CHRONIC KIDNEY DISEASE) STAGE 3, GFR 30-59 ML/MIN (HCC): Chronic | ICD-10-CM

## 2017-08-04 DIAGNOSIS — I51.81 TAKOTSUBO CARDIOMYOPATHY: Primary | ICD-10-CM

## 2017-08-04 DIAGNOSIS — I10 ESSENTIAL HYPERTENSION: Chronic | ICD-10-CM

## 2017-08-04 DIAGNOSIS — I50.21 ACUTE SYSTOLIC HEART FAILURE (HCC): ICD-10-CM

## 2017-08-04 DIAGNOSIS — E03.8 OTHER SPECIFIED HYPOTHYROIDISM: ICD-10-CM

## 2017-08-04 PROCEDURE — 99214 OFFICE O/P EST MOD 30 MIN: CPT | Performed by: NURSE PRACTITIONER

## 2017-08-04 RX ORDER — POTASSIUM CHLORIDE 750 MG/1
10 TABLET, EXTENDED RELEASE ORAL DAILY
Qty: 30 TABLET | Refills: 3 | Status: ON HOLD | OUTPATIENT
Start: 2017-08-04 | End: 2017-09-08

## 2017-08-04 NOTE — PROGRESS NOTES
Subjective:     Encounter Date:2017      Patient ID: Sylvia Jalloh is a 82 y.o. female.    Chief Complaint:  History of Present Illness:  Mrs. Jalloh comes into the [facility] today at the request of Dr. Gómez.   She was hospitalized  through the  for acute systolic heart failure with determined to be Takotsubo cardiomyopathy.  Initial LVEF noted to be 21-30%.  Cardiac cath was performed 17 which showed normal coronary arteries.  LVEF @ cath was 45-50%.  She will be following up with Dr. Thiago Vega later this month as well.  Her main question today is about her diuretic/ KCL supplement.  Prior to admit, she took one potassium and the Maxide daily.  She has not been taking her BP @ home since discharge.  (Her cuff needs batteries).  Overall, she states she is feeling better every day.      Past Medical History:   Diagnosis Date   • Chronic kidney disease    • Disease of thyroid gland    • Gallstone pancreatitis    • Hypertension    • Never smoked any substance        Past Surgical History:   Procedure Laterality Date   • APPENDECTOMY     • BLADDER SUSPENSION     • CARDIAC CATHETERIZATION N/A 2017    Procedure: Coronary angiography;  Surgeon: Thiago Vega MD;  Location: [facility] INVASIVE LOCATION;  Service:    • CHOLECYSTECTOMY     • HYSTERECTOMY         Social History     Social History   • Marital status:      Spouse name: N/A   • Number of children: N/A   • Years of education: N/A     Occupational History   • Retired      Social History Main Topics   • Smoking status: Never Smoker   • Smokeless tobacco: Never Used   • Alcohol use No   • Drug use: No   • Sexual activity: Defer     Other Topics Concern   • Not on file     Social History Narrative    Daughter just  from cancer 2 weeks ago, lots of stress.  Patient was her primary caregiver.        Caffeine :0-1 servings per day.    [facility] at [facility] [place]  Her home       Family History    Problem Relation Age of Onset   • Heart disease Mother    • Heart disease Father    • Early death Sister    • Heart disease Sister    • No Known Problems Brother    • Cancer Daughter        Review of Systems   Constitution: Negative.   HENT: Negative.    Eyes: Negative.    Cardiovascular: Negative.    Endocrine: Negative.    Hematologic/Lymphatic: Negative.    Skin: Negative.    Musculoskeletal: Negative.    Gastrointestinal: Negative.    Genitourinary: Negative.    Neurological: Negative.    Psychiatric/Behavioral: Negative.    Allergic/Immunologic: Negative.          Objective:     Physical Exam   Constitutional: She is oriented to person, place, and time. She appears well-developed and well-nourished. No distress.   Petite elderly female in NAD   HENT:   Head: Normocephalic and atraumatic.   Eyes: Pupils are equal, round, and reactive to light.   Neck: Neck supple. No JVD present.   Cardiovascular: Normal rate, regular rhythm, normal heart sounds and intact distal pulses.    Pulmonary/Chest: Effort normal. No respiratory distress. She has no wheezes. She has rales.   Few fine rales LLL. Otherwise clear   Musculoskeletal: Normal range of motion. She exhibits no edema.   Neurological: She is alert and oriented to person, place, and time. No cranial nerve deficit.   Skin: Skin is warm and dry.   Resolving ecchymosis left wrist w/o hematoma   Psychiatric: She has a normal mood and affect. Her behavior is normal.       Lab Review: Hospital notes, labs, diagnostic studies     Assessment/Plan:        Diagnosis Plan   1. Takotsubo cardiomyopathy  Coreg, losartan, low dose diuretic.  I discussed changing Maxide/ KCL to aldactone, but she is reluctant to make any other changes at this time.  However, she is willing to reduce the potassium back to once daily as was her habit prior to admission.  I have also asked her to keep a list of her BP/ Pulse twice daily until follow up with Cardiology on 8/24/17.  She is agreeable.      2. Essential hypertension  As above   3. Other specified hypothyroidism  TSH was elevated in hospital to 7.7 but free T4 was WNL.  Discuss levothyroxine dose w/ PCP.   4. CKD (chronic kidney disease) stage 3, GFR 30-59 ml/min  Stable at time of hospital DC.  Routine monitoring per Primary Care.

## 2017-08-24 ENCOUNTER — OFFICE VISIT (OUTPATIENT)
Dept: CARDIOLOGY | Facility: CLINIC | Age: 82
End: 2017-08-24

## 2017-08-24 VITALS
WEIGHT: 118.2 LBS | DIASTOLIC BLOOD PRESSURE: 76 MMHG | SYSTOLIC BLOOD PRESSURE: 124 MMHG | HEIGHT: 60 IN | HEART RATE: 73 BPM | BODY MASS INDEX: 23.2 KG/M2

## 2017-08-24 DIAGNOSIS — I51.81 TAKOTSUBO CARDIOMYOPATHY: Primary | ICD-10-CM

## 2017-08-24 DIAGNOSIS — I10 ESSENTIAL HYPERTENSION: Chronic | ICD-10-CM

## 2017-08-24 DIAGNOSIS — I44.7 BUNDLE BRANCH BLOCK, LEFT: Chronic | ICD-10-CM

## 2017-08-24 DIAGNOSIS — I50.21 ACUTE SYSTOLIC HEART FAILURE (HCC): ICD-10-CM

## 2017-08-24 PROCEDURE — 99213 OFFICE O/P EST LOW 20 MIN: CPT | Performed by: INTERNAL MEDICINE

## 2017-08-24 NOTE — PROGRESS NOTES
Subjective:     Encounter Date:08/24/2017      Patient ID: Sylvia Jalloh is a 83 y.o. female.    Chief Complaint: Congestive Heart Failure and Hypertension    PROBLEM LIST:  1. Acute on chronic heart failure/ takutsubo cardiomyopathy  a. Echo 7/28/2017 showed EF 21-30%, BNP >1000 and bilateral effusions noted.  b. LHC 7/31/2017: Normal coronary arteries with low normal LVEF of 45-50%.  2. LBBB  a. New, compared to EKG of 2015.  3. Hypertension  4. Hypothyroidism   a. TSH elevated, normal T4, per hospitalist.  5. CKD stage 3, GFR 30-59 ml/min    History of Present Illness  Patient returns today for follow up with a history of CHF and cardiac risk factors. She has been doing much better from a cardiac standpoint since her last visit. Has not taken Aspirin in several years. Denies any exertional chest pain, shortness of breath, orthopnea, PND, or palpitations. Remains as busy and active as tolerable.    Allergies   Allergen Reactions   • Ace Inhibitors Rash   • Latex Rash         Current Outpatient Prescriptions:   •  carvedilol (COREG) 6.25 MG tablet, Take 1 tablet by mouth 2 (Two) Times a Day With Meals., Disp: 60 tablet, Rfl: 11  •  estradiol (ESTRACE) 1 MG tablet, Take 1 mg by mouth Daily., Disp: , Rfl:   •  hyoscyamine (LEVBID) 0.375 MG 12 hr tablet, Take 0.375 mg by mouth Daily., Disp: , Rfl:   •  levothyroxine (SYNTHROID, LEVOTHROID) 112 MCG tablet, Take 112 mcg by mouth Daily., Disp: , Rfl:   •  losartan (COZAAR) 100 MG tablet, Take 1 tablet by mouth Daily., Disp: 30 tablet, Rfl: 11  •  potassium chloride (K-DUR,KLOR-CON) 10 MEQ CR tablet, Take 1 tablet by mouth Daily., Disp: 30 tablet, Rfl: 3  •  triamterene-hydrochlorothiazide (MAXZIDE-25) 37.5-25 MG per tablet, Take 1 tablet by mouth Daily., Disp: , Rfl:   •  Vitamin D, Cholecalciferol, 1000 UNITS capsule, Take 1,000 Units by mouth Daily., Disp: , Rfl:     The following portions of the patient's history were reviewed and updated as appropriate:  "allergies, current medications, past family history, past medical history, past social history, past surgical history and problem list.    Review of Systems   Constitution: Negative.   Cardiovascular: Negative.    Respiratory: Negative.    Hematologic/Lymphatic: Negative for bleeding problem. Does not bruise/bleed easily.   Skin: Negative for rash.   Musculoskeletal: Negative for muscle weakness and myalgias.   Gastrointestinal: Negative for heartburn, nausea and vomiting.   Neurological: Negative.           Objective:     Vitals:    08/24/17 1553   BP: 124/76   BP Location: Right arm   Patient Position: Sitting   Pulse: 73   Weight: 118 lb 3.2 oz (53.6 kg)   Height: 60\" (152.4 cm)         Physical Exam   Constitutional: She is oriented to person, place, and time. She appears well-developed and well-nourished.   HENT:   Head: Normocephalic and atraumatic.   Eyes: Pupils are equal, round, and reactive to light. No scleral icterus.   Neck: No JVD present. Carotid bruit is not present. No thyromegaly present.   Cardiovascular: Normal rate, regular rhythm, S1 normal and S2 normal.  Exam reveals no gallop.    No murmur heard.  Pulmonary/Chest: Effort normal and breath sounds normal.   Abdominal: Soft. There is no hepatosplenomegaly. There is no tenderness.   Neurological: She is alert and oriented to person, place, and time.   Skin: Skin is warm and dry. No cyanosis. Nails show no clubbing.   Psychiatric: She has a normal mood and affect. Her behavior is normal.       Lab Review:    Procedures        Assessment:   Sylvia was seen today for congestive heart failure and hypertension.    Diagnoses and all orders for this visit:    Takotsubo cardiomyopathy    Acute systolic heart failure    Bundle branch block, left    Essential hypertension        Impression  1. Stress-induced Cardiomyopathy:   1. LVEF is near normalize, and patient is now asymptomatic  2. Hypertension: well controlled.    Plan:  1. Continue current " outpatient medications.  2. Revisit in 6 MO, or sooner as needed.  To consider withdrawal of cardiovascular medicines of echocardiogram is normal    Scribed for Thiago Vega MD by Oswaldo Arauz. 8/24/2017  4:32 PM    I, Thiago Vega MD, personally performed the services described in this documentation as scribed by the above individual in my presence, and it is both accurate and complete      Please note that portions of this note may have been completed with a voice recognition program. Efforts were made to edit the dictations, but occasionally words are mistranscribed.

## 2017-09-06 ENCOUNTER — APPOINTMENT (OUTPATIENT)
Dept: CARDIOLOGY | Facility: HOSPITAL | Age: 82
End: 2017-09-06

## 2017-09-06 ENCOUNTER — APPOINTMENT (OUTPATIENT)
Dept: GENERAL RADIOLOGY | Facility: HOSPITAL | Age: 82
End: 2017-09-06

## 2017-09-06 ENCOUNTER — APPOINTMENT (OUTPATIENT)
Dept: NUCLEAR MEDICINE | Facility: HOSPITAL | Age: 82
End: 2017-09-06
Attending: EMERGENCY MEDICINE

## 2017-09-06 ENCOUNTER — HOSPITAL ENCOUNTER (INPATIENT)
Facility: HOSPITAL | Age: 82
LOS: 2 days | Discharge: HOME | End: 2017-09-08
Attending: EMERGENCY MEDICINE | Admitting: INTERNAL MEDICINE

## 2017-09-06 DIAGNOSIS — I50.21 ACUTE SYSTOLIC HEART FAILURE (HCC): ICD-10-CM
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
## 2017-09-06 DIAGNOSIS — D72.829 LEUKOCYTOSIS, UNSPECIFIED TYPE: ICD-10-CM

## 2017-09-06 DIAGNOSIS — J18.9 HAP (HOSPITAL-ACQUIRED PNEUMONIA): ICD-10-CM

## 2017-09-06 DIAGNOSIS — R06.00 DYSPNEA, UNSPECIFIED TYPE: ICD-10-CM

## 2017-09-06 DIAGNOSIS — I50.23 ACUTE ON CHRONIC SYSTOLIC CONGESTIVE HEART FAILURE (HCC): Primary | ICD-10-CM

## 2017-09-06 PROBLEM — A41.9 SEPSIS (HCC): Status: ACTIVE | Noted: 2017-09-06

## 2017-09-06 PROBLEM — I42.8 NON-ISCHEMIC CARDIOMYOPATHY (HCC): Status: ACTIVE | Noted: 2017-07-30

## 2017-09-06 PROBLEM — R65.20 SEVERE SEPSIS (HCC): Status: ACTIVE | Noted: 2017-09-06

## 2017-09-06 PROBLEM — I42.9 CARDIOMYOPATHY (HCC): Status: ACTIVE | Noted: 2017-07-30

## 2017-09-06 PROBLEM — A41.9 SEVERE SEPSIS (HCC): Status: ACTIVE | Noted: 2017-09-06

## 2017-09-06 PROBLEM — J96.01 ACUTE RESPIRATORY FAILURE WITH HYPOXIA (HCC): Status: ACTIVE | Noted: 2017-09-06

## 2017-09-06 PROBLEM — I50.20 SYSTOLIC HEART FAILURE (HCC): Status: ACTIVE | Noted: 2017-09-06

## 2017-09-06 PROBLEM — R79.89 ELEVATED LACTIC ACID LEVEL: Status: ACTIVE | Noted: 2017-09-06

## 2017-09-06 PROBLEM — E87.20 LACTIC ACIDOSIS: Status: ACTIVE | Noted: 2017-09-06

## 2017-09-06 LAB
ALBUMIN SERPL-MCNC: 4.2 G/DL (ref 3.2–4.8)
ALP SERPL-CCNC: 80 U/L (ref 25–100)
ALT SERPL W P-5'-P-CCNC: 18 U/L (ref 7–40)
ARTERIAL PATENCY WRIST A: POSITIVE
AST SERPL-CCNC: 29 U/L (ref 0–33)
ATMOSPHERIC PRESS: ABNORMAL MMHG
BACTERIA UR QL AUTO: ABNORMAL /HPF
BASE EXCESS BLDA CALC-SCNC: -3.6 MMOL/L (ref 0–2)
BASOPHILS # BLD AUTO: 0.08 10*3/MM3 (ref 0–0.2)
BASOPHILS NFR BLD AUTO: 0.6 % (ref 0–1)
BDY SITE: ABNORMAL
BILIRUB CONJ SERPL-MCNC: 0.3 MG/DL (ref 0–0.2)
BILIRUB INDIRECT SERPL-MCNC: 1.2 MG/DL (ref 0.1–1.1)
BILIRUB SERPL-MCNC: 1.5 MG/DL (ref 0.3–1.2)
BILIRUB UR QL STRIP: NEGATIVE
BNP SERPL-MCNC: 650 PG/ML (ref 0–100)
BUN BLDA-MCNC: 25 MG/DL (ref 8–26)
CA-I BLDA-SCNC: 1.19 MMOL/L (ref 1.2–1.32)
CHLORIDE BLDA-SCNC: 103 MMOL/L (ref 98–109)
CLARITY UR: ABNORMAL
CO2 BLDA-SCNC: 22.2 MMOL/L (ref 22–33)
CO2 BLDA-SCNC: 24 MMOL/L (ref 24–29)
COHGB MFR BLD: 1.9 % (ref 0–2)
COLOR UR: YELLOW
CREAT BLDA-MCNC: 1.3 MG/DL (ref 0.6–1.3)
D DIMER PPP FEU-MCNC: 1.62 MG/L (FEU) (ref 0–0.5)
D DIMER PPP FEU-MCNC: 1.66 MG/L (FEU) (ref 0–0.5)
D-LACTATE SERPL-SCNC: 1.4 MMOL/L (ref 0.5–2)
D-LACTATE SERPL-SCNC: 2.7 MMOL/L (ref 0.5–2)
D-LACTATE SERPL-SCNC: 4.2 MMOL/L (ref 0.5–2)
DEPRECATED RDW RBC AUTO: 51.3 FL (ref 37–54)
EOSINOPHIL # BLD AUTO: 0.31 10*3/MM3 (ref 0–0.3)
EOSINOPHIL NFR BLD AUTO: 2.3 % (ref 0–3)
ERYTHROCYTE [DISTWIDTH] IN BLOOD BY AUTOMATED COUNT: 13.8 % (ref 11.3–14.5)
GLUCOSE BLDC GLUCOMTR-MCNC: 204 MG/DL (ref 70–130)
GLUCOSE UR STRIP-MCNC: NEGATIVE MG/DL
HCO3 BLDA-SCNC: 21.1 MMOL/L (ref 20–26)
HCT VFR BLD AUTO: 42.1 % (ref 34.5–44)
HCT VFR BLD CALC: 42.2 %
HCT VFR BLDA CALC: 43 % (ref 38–51)
HGB BLD-MCNC: 13.9 G/DL (ref 11.5–15.5)
HGB BLDA-MCNC: 13.8 G/DL (ref 14–18)
HGB BLDA-MCNC: 14.6 G/DL (ref 12–17)
HGB UR QL STRIP.AUTO: NEGATIVE
HOROWITZ INDEX BLD+IHG-RTO: 60 %
HYALINE CASTS UR QL AUTO: ABNORMAL /LPF
IMM GRANULOCYTES # BLD: 0.03 10*3/MM3 (ref 0–0.03)
IMM GRANULOCYTES NFR BLD: 0.2 % (ref 0–0.6)
INR PPP: 1
KETONES UR QL STRIP: NEGATIVE
LEUKOCYTE ESTERASE UR QL STRIP.AUTO: NEGATIVE
LYMPHOCYTES # BLD AUTO: 2.69 10*3/MM3 (ref 0.6–4.8)
LYMPHOCYTES NFR BLD AUTO: 20.3 % (ref 24–44)
MAGNESIUM SERPL-MCNC: 2 MG/DL (ref 1.3–2.7)
MCH RBC QN AUTO: 33.3 PG (ref 27–31)
MCHC RBC AUTO-ENTMCNC: 33 G/DL (ref 32–36)
MCV RBC AUTO: 101 FL (ref 80–99)
METHGB BLD QL: 0.8 % (ref 0–1.5)
MODALITY: ABNORMAL
MONOCYTES # BLD AUTO: 1.05 10*3/MM3 (ref 0–1)
MONOCYTES NFR BLD AUTO: 7.9 % (ref 0–12)
NEUTROPHILS # BLD AUTO: 9.1 10*3/MM3 (ref 1.5–8.3)
NEUTROPHILS NFR BLD AUTO: 68.7 % (ref 41–71)
NITRITE UR QL STRIP: NEGATIVE
OXYHGB MFR BLDV: 89.4 % (ref 94–99)
PCO2 BLDA: 36.4 MM HG (ref 35–45)
PH BLDA: 7.37 PH UNITS (ref 7.35–7.45)
PH UR STRIP.AUTO: 6 [PH] (ref 5–8)
PHOSPHATE SERPL-MCNC: 4.2 MG/DL (ref 2.4–5.1)
PLATELET # BLD AUTO: 233 10*3/MM3 (ref 150–450)
PMV BLD AUTO: 10.4 FL (ref 6–12)
PO2 BLDA: 63.5 MM HG (ref 83–108)
POTASSIUM BLD-SCNC: 3.8 MMOL/L (ref 3.5–5.5)
POTASSIUM BLDA-SCNC: 3.4 MMOL/L (ref 3.5–4.9)
PROCALCITONIN SERPL-MCNC: <0.05 NG/ML
PROT SERPL-MCNC: 7.2 G/DL (ref 5.7–8.2)
PROT UR QL STRIP: NEGATIVE
PROTHROMBIN TIME: 10.9 SECONDS (ref 9.6–11.5)
RBC # BLD AUTO: 4.17 10*6/MM3 (ref 3.89–5.14)
RBC # UR: ABNORMAL /HPF
REF LAB TEST METHOD: ABNORMAL
SODIUM BLDA-SCNC: 142 MMOL/L (ref 138–146)
SP GR UR STRIP: 1.01 (ref 1–1.03)
SQUAMOUS #/AREA URNS HPF: ABNORMAL /HPF
TROPONIN I SERPL-MCNC: 0.02 NG/ML (ref 0–0.07)
UROBILINOGEN UR QL STRIP: ABNORMAL
WBC NRBC COR # BLD: 13.26 10*3/MM3 (ref 3.5–10.8)
WBC UR QL AUTO: ABNORMAL /HPF

## 2017-09-06 PROCEDURE — 0 TECHNETIUM ALBUMIN AGGREGATED: Performed by: INTERNAL MEDICINE

## 2017-09-06 PROCEDURE — 83735 ASSAY OF MAGNESIUM: CPT | Performed by: EMERGENCY MEDICINE

## 2017-09-06 PROCEDURE — 25010000002 CEFEPIME: Performed by: EMERGENCY MEDICINE

## 2017-09-06 PROCEDURE — 87077 CULTURE AEROBIC IDENTIFY: CPT | Performed by: EMERGENCY MEDICINE

## 2017-09-06 PROCEDURE — C8929 TTE W OR WO FOL WCON,DOPPLER: HCPCS

## 2017-09-06 PROCEDURE — A9558 XE133 XENON 10MCI: HCPCS | Performed by: INTERNAL MEDICINE

## 2017-09-06 PROCEDURE — 25010000002 SULFUR HEXAFLUORIDE MICROSPH 60.7-25 MG RECONSTITUTED SUSPENSION: Performed by: INTERNAL MEDICINE

## 2017-09-06 PROCEDURE — 25010000002 FUROSEMIDE PER 20 MG: Performed by: INTERNAL MEDICINE

## 2017-09-06 PROCEDURE — 84132 ASSAY OF SERUM POTASSIUM: CPT | Performed by: INTERNAL MEDICINE

## 2017-09-06 PROCEDURE — 99222 1ST HOSP IP/OBS MODERATE 55: CPT | Performed by: INTERNAL MEDICINE

## 2017-09-06 PROCEDURE — 85379 FIBRIN DEGRADATION QUANT: CPT | Performed by: EMERGENCY MEDICINE

## 2017-09-06 PROCEDURE — 94660 CPAP INITIATION&MGMT: CPT

## 2017-09-06 PROCEDURE — 0 XENON XE 133: Performed by: INTERNAL MEDICINE

## 2017-09-06 PROCEDURE — 94799 UNLISTED PULMONARY SVC/PX: CPT

## 2017-09-06 PROCEDURE — 82805 BLOOD GASES W/O2 SATURATION: CPT | Performed by: EMERGENCY MEDICINE

## 2017-09-06 PROCEDURE — A9540 TC99M MAA: HCPCS | Performed by: INTERNAL MEDICINE

## 2017-09-06 PROCEDURE — 85025 COMPLETE CBC W/AUTO DIFF WBC: CPT | Performed by: EMERGENCY MEDICINE

## 2017-09-06 PROCEDURE — 99291 CRITICAL CARE FIRST HOUR: CPT | Performed by: INTERNAL MEDICINE

## 2017-09-06 PROCEDURE — 81001 URINALYSIS AUTO W/SCOPE: CPT | Performed by: EMERGENCY MEDICINE

## 2017-09-06 PROCEDURE — 87040 BLOOD CULTURE FOR BACTERIA: CPT | Performed by: EMERGENCY MEDICINE

## 2017-09-06 PROCEDURE — 25010000002 HEPARIN (PORCINE) PER 1000 UNITS: Performed by: NURSE PRACTITIONER

## 2017-09-06 PROCEDURE — 93306 TTE W/DOPPLER COMPLETE: CPT | Performed by: INTERNAL MEDICINE

## 2017-09-06 PROCEDURE — 25010000002 VANCOMYCIN PER 500 MG: Performed by: EMERGENCY MEDICINE

## 2017-09-06 PROCEDURE — 85610 PROTHROMBIN TIME: CPT | Performed by: EMERGENCY MEDICINE

## 2017-09-06 PROCEDURE — 87186 SC STD MICRODIL/AGAR DIL: CPT | Performed by: EMERGENCY MEDICINE

## 2017-09-06 PROCEDURE — 83605 ASSAY OF LACTIC ACID: CPT | Performed by: NURSE PRACTITIONER

## 2017-09-06 PROCEDURE — 80076 HEPATIC FUNCTION PANEL: CPT | Performed by: EMERGENCY MEDICINE

## 2017-09-06 PROCEDURE — 85014 HEMATOCRIT: CPT

## 2017-09-06 PROCEDURE — 93005 ELECTROCARDIOGRAM TRACING: CPT | Performed by: EMERGENCY MEDICINE

## 2017-09-06 PROCEDURE — 87086 URINE CULTURE/COLONY COUNT: CPT | Performed by: EMERGENCY MEDICINE

## 2017-09-06 PROCEDURE — 36600 WITHDRAWAL OF ARTERIAL BLOOD: CPT | Performed by: EMERGENCY MEDICINE

## 2017-09-06 PROCEDURE — 84145 PROCALCITONIN (PCT): CPT | Performed by: EMERGENCY MEDICINE

## 2017-09-06 PROCEDURE — 84100 ASSAY OF PHOSPHORUS: CPT | Performed by: NURSE PRACTITIONER

## 2017-09-06 PROCEDURE — 83605 ASSAY OF LACTIC ACID: CPT | Performed by: EMERGENCY MEDICINE

## 2017-09-06 PROCEDURE — 83880 ASSAY OF NATRIURETIC PEPTIDE: CPT | Performed by: EMERGENCY MEDICINE

## 2017-09-06 PROCEDURE — 84484 ASSAY OF TROPONIN QUANT: CPT

## 2017-09-06 PROCEDURE — 78582 LUNG VENTILAT&PERFUS IMAGING: CPT

## 2017-09-06 PROCEDURE — 71010 HC CHEST PA OR AP: CPT

## 2017-09-06 PROCEDURE — 80047 BASIC METABLC PNL IONIZED CA: CPT

## 2017-09-06 PROCEDURE — 99285 EMERGENCY DEPT VISIT HI MDM: CPT

## 2017-09-06 RX ORDER — FUROSEMIDE 10 MG/ML
40 INJECTION INTRAMUSCULAR; INTRAVENOUS DAILY
Status: DISCONTINUED | OUTPATIENT
Start: 2017-09-07 | End: 2017-09-08 | Stop reason: HOSPADM

## 2017-09-06 RX ORDER — SODIUM CHLORIDE 0.9 % (FLUSH) 0.9 %
10 SYRINGE (ML) INJECTION AS NEEDED
Status: DISCONTINUED | OUTPATIENT
Start: 2017-09-06 | End: 2017-09-08 | Stop reason: HOSPADM

## 2017-09-06 RX ORDER — LEVOFLOXACIN 5 MG/ML
750 INJECTION, SOLUTION INTRAVENOUS
Status: DISCONTINUED | OUTPATIENT
Start: 2017-09-06 | End: 2017-09-06

## 2017-09-06 RX ORDER — FAMOTIDINE 10 MG/ML
20 INJECTION, SOLUTION INTRAVENOUS DAILY
Status: DISCONTINUED | OUTPATIENT
Start: 2017-09-06 | End: 2017-09-07

## 2017-09-06 RX ORDER — POTASSIUM CHLORIDE 750 MG/1
40 CAPSULE, EXTENDED RELEASE ORAL AS NEEDED
Status: DISCONTINUED | OUTPATIENT
Start: 2017-09-06 | End: 2017-09-08 | Stop reason: HOSPADM

## 2017-09-06 RX ORDER — LEVOTHYROXINE SODIUM 112 UG/1
112 TABLET ORAL
Status: DISCONTINUED | OUTPATIENT
Start: 2017-09-06 | End: 2017-09-08 | Stop reason: HOSPADM

## 2017-09-06 RX ORDER — FUROSEMIDE 10 MG/ML
20 INJECTION INTRAMUSCULAR; INTRAVENOUS ONCE
Status: COMPLETED | OUTPATIENT
Start: 2017-09-06 | End: 2017-09-06

## 2017-09-06 RX ORDER — SODIUM CHLORIDE 0.9 % (FLUSH) 0.9 %
1-10 SYRINGE (ML) INJECTION AS NEEDED
Status: DISCONTINUED | OUTPATIENT
Start: 2017-09-06 | End: 2017-09-08 | Stop reason: HOSPADM

## 2017-09-06 RX ORDER — NITROGLYCERIN 0.4 MG/1
0.4 TABLET SUBLINGUAL
Status: DISCONTINUED | OUTPATIENT
Start: 2017-09-06 | End: 2017-09-08 | Stop reason: HOSPADM

## 2017-09-06 RX ORDER — CARVEDILOL 6.25 MG/1
6.25 TABLET ORAL EVERY 12 HOURS SCHEDULED
Status: DISCONTINUED | OUTPATIENT
Start: 2017-09-06 | End: 2017-09-08 | Stop reason: HOSPADM

## 2017-09-06 RX ORDER — LOSARTAN POTASSIUM 50 MG/1
100 TABLET ORAL
Status: DISCONTINUED | OUTPATIENT
Start: 2017-09-06 | End: 2017-09-08 | Stop reason: HOSPADM

## 2017-09-06 RX ORDER — ACETAMINOPHEN 325 MG/1
650 TABLET ORAL EVERY 6 HOURS PRN
Status: DISCONTINUED | OUTPATIENT
Start: 2017-09-06 | End: 2017-09-08 | Stop reason: HOSPADM

## 2017-09-06 RX ORDER — HEPARIN SODIUM 5000 [USP'U]/ML
5000 INJECTION, SOLUTION INTRAVENOUS; SUBCUTANEOUS EVERY 12 HOURS SCHEDULED
Status: DISCONTINUED | OUTPATIENT
Start: 2017-09-06 | End: 2017-09-08 | Stop reason: HOSPADM

## 2017-09-06 RX ORDER — VANCOMYCIN HYDROCHLORIDE 1 G/200ML
20 INJECTION, SOLUTION INTRAVENOUS ONCE
Status: COMPLETED | OUTPATIENT
Start: 2017-09-06 | End: 2017-09-06

## 2017-09-06 RX ORDER — FUROSEMIDE 10 MG/ML
40 INJECTION INTRAMUSCULAR; INTRAVENOUS ONCE
Status: DISCONTINUED | OUTPATIENT
Start: 2017-09-06 | End: 2017-09-06

## 2017-09-06 RX ORDER — ASPIRIN 325 MG
325 TABLET ORAL ONCE
Status: COMPLETED | OUTPATIENT
Start: 2017-09-06 | End: 2017-09-06

## 2017-09-06 RX ADMIN — HEPARIN SODIUM 5000 UNITS: 5000 INJECTION, SOLUTION INTRAVENOUS; SUBCUTANEOUS at 20:09

## 2017-09-06 RX ADMIN — LEVOTHYROXINE SODIUM 112 MCG: 112 TABLET ORAL at 09:40

## 2017-09-06 RX ADMIN — CEFEPIME 2 G: 2 INJECTION, POWDER, FOR SOLUTION INTRAVENOUS at 06:29

## 2017-09-06 RX ADMIN — LOSARTAN POTASSIUM 100 MG: 50 TABLET, FILM COATED ORAL at 11:24

## 2017-09-06 RX ADMIN — XENON XE-133 11.21 MILLICURIE: 10 GAS RESPIRATORY (INHALATION) at 10:11

## 2017-09-06 RX ADMIN — ASPIRIN 325 MG ORAL TABLET 325 MG: 325 PILL ORAL at 05:11

## 2017-09-06 RX ADMIN — FUROSEMIDE 20 MG: 10 INJECTION, SOLUTION INTRAMUSCULAR; INTRAVENOUS at 10:50

## 2017-09-06 RX ADMIN — CARVEDILOL 6.25 MG: 6.25 TABLET, FILM COATED ORAL at 11:24

## 2017-09-06 RX ADMIN — VANCOMYCIN HYDROCHLORIDE 1000 MG: 1 INJECTION, SOLUTION INTRAVENOUS at 07:06

## 2017-09-06 RX ADMIN — NITROGLYCERIN 0.4 MG: 0.4 TABLET SUBLINGUAL at 05:05

## 2017-09-06 RX ADMIN — Medication 1 DOSE: at 10:27

## 2017-09-06 RX ADMIN — CARVEDILOL 6.25 MG: 6.25 TABLET, FILM COATED ORAL at 20:10

## 2017-09-06 RX ADMIN — SULFUR HEXAFLUORIDE 2 ML: KIT at 08:57

## 2017-09-06 RX ADMIN — FAMOTIDINE 20 MG: 10 INJECTION INTRAVENOUS at 10:51

## 2017-09-06 RX ADMIN — HEPARIN SODIUM 5000 UNITS: 5000 INJECTION, SOLUTION INTRAVENOUS; SUBCUTANEOUS at 09:40

## 2017-09-06 RX ADMIN — POTASSIUM CHLORIDE 40 MEQ: 750 CAPSULE, EXTENDED RELEASE ORAL at 11:24

## 2017-09-06 RX ADMIN — POTASSIUM CHLORIDE 40 MEQ: 750 CAPSULE, EXTENDED RELEASE ORAL at 16:05

## 2017-09-06 RX ADMIN — ACETAMINOPHEN 650 MG: 325 TABLET, FILM COATED ORAL at 22:26

## 2017-09-06 NOTE — CONSULTS
Inpatient Consult to Cardiology  Consult performed by: ANTIONE KUNZ  Consult ordered by: ALESSANDRA ENRIQUE Cardiology at [facility]  Cardiovascular Consultation Note         The patient is an 84 y/o female who was discharged from [facility] on 07/30/2017 where she had been hospitalized for 3 days with acute stress induced cardiomyopathy/takasubo.  She underwent a cardiac cath that showed normal coronary arteries and a low normal LVEF of 45-50%. She recently followed up with Dr Bola Vega in clinic 2 weeks ago and was doing well at that time.  She has been out of her cardiac medications for the last 3 days and unable to fill them over the holiday weekend. She was awoken early this morning gurgling and was acutely short of breath.  She contacted her daughter who advised her to call 911. On EMS arrival her O2 sats were 53% on room air and she was given supplemental oxygen with neb treatments whch increased her O2 sats to the 80's.  We are being consulted for acute heart failure with elevated . She denies chest pain,troponin's are negative and EKG unchanged from prior ones with NSR and a LBBB.  Results of echocardiogram and VQ scan pending. She has a positive d dimer, mildly elevated white count and elevated lactic acid level.  She is currently breathing easy on O2 at 4 liters via nasal cannula with O2 sats of 99%.       Past medical and surgical history, social and family history reviewed in EMR.    REVIEW OF SYSTEMS:   H&P ROS reviewed and pertinent CV ROS as noted in HPI.         Vital Sign Min/Max for last 24 hours  Temp  Min: 97.7 °F (36.5 °C)  Max: 98.2 °F (36.8 °C)   BP  Min: 117/77  Max: 188/111   Pulse  Min: 79  Max: 103   Resp  Min: 18  Max: 20   SpO2  Min: 78 %  Max: 99 %   Flow (L/min)  Min: 4  Max: 4      Intake/Output Summary (Last 24 hours) at 09/06/17 1121  Last data filed at 09/06/17 1057   Gross per 24 hour   Intake                0 ml    Output             1425 ml   Net            -1425 ml           Physical Exam   Constitutional: She is oriented to person, place, and time. She appears well-developed and well-nourished.   HENT:   Head: Normocephalic and atraumatic.   Eyes: Pupils are equal, round, and reactive to light. No scleral icterus.   Neck: No JVD present. Carotid bruit is not present. No thyromegaly present.   Cardiovascular: Normal rate, regular rhythm, S1 normal and S2 normal.  Exam reveals no gallop.    Murmur heard.  Pulmonary/Chest: Effort normal. She has rhonchi in the left middle field and the left lower field.   Abdominal: Soft. There is no hepatosplenomegaly. There is no tenderness.   Neurological: She is alert and oriented to person, place, and time.   Skin: Skin is warm and dry. No cyanosis. Nails show no clubbing.   Psychiatric: She has a normal mood and affect. Her behavior is normal.       EKG: NSR with LBBB    Lab Review:   Labs reviewed in the electronic medical record.  Pertinent findings include:  Lab Results   Component Value Date    GLUCOSE 94 07/30/2017    BUN 21 07/30/2017    CREATININE 1.30 09/06/2017    EGFRIFNONA 39 (L) 07/30/2017    BCR 16.2 07/30/2017    K 3.6 07/30/2017    CO2 31.0 07/30/2017    CALCIUM 8.9 07/30/2017    ALBUMIN 4.20 09/06/2017    LABIL2 1.5 07/27/2017    AST 29 09/06/2017    ALT 18 09/06/2017     Lab Results   Component Value Date    WBC 13.26 (H) 09/06/2017    HGB 13.9 09/06/2017    HCT 42.1 09/06/2017    .0 (H) 09/06/2017     09/06/2017           Hospital Problem List     * (Principal)Acute respiratory failure with hypoxia    Bundle branch block, left (Chronic)    Overview Addendum 9/6/2017 11:20 AM by LOREN Mullen     · Known, not new.  Cardiomyopathy likely due to chronic LBBB with dyssynchronization of right and left ventricles.           Acute systolic heart failure    Overview Signed 7/29/2017 11:35 AM by Thiago Vega MD     Added automatically from request  for surgery [id number]         Non-ischemic cardiomyopathy    Overview Addendum 9/6/2017 11:19 AM by LOREN Mullen     · Echo (09/06/2017): LVEF 35%.  · Echo (07/28/2017): LVEF 21 - 30%. Hypokinesis of the septal and anterior segments. Grade II diastolic dysfunction. Mild AS. Moderate MR. Mild IL and TR. RVSP= 45 mmHg  · Cardiac cath (07/28/2017): Normal coronary arteries. LVEF 45-50%         R/O Sepsis    Elevated lactic acid level    CKD (chronic kidney disease) stage 3, GFR 30-59 ml/min (Chronic)    Essential hypertension (Chronic)    Hypothyroidism    Overview Deleted 9/6/2017  7:59 AM by LOREN Corbett                              · Restart home medications including Coreg and Losartan  · Diuresis with IV Lasix 40 mg daily, watch BUN and creatinine closely  · May benefit from referral to EP for consideration of a Bi ventricular pacemaker for resynchronization therapy.  This is Dr Scott Lenz-Viewed all the patient's records saw the patient performed a physical exam and discussed the case with the patient's daughter and physician extender.  I have reviewed both echocardiograms the one done today as well as the one previously in both the ejection fractions appear the same.  However the echocardiogram pattern is that of a left bundle branch block myopathy as opposed to Tako Tsubo.  The septum is severely dyskinetic.  I agree that the patient decompensated on not taking her medications and her blood pressure also went up leading to heart failure.  Clinically I don't see any JVP I don't hear a gallop on examination of the heart she has bilateral crackles but I suspect that even after diuresis post crackles may persist to represent some sort of scarring of the lung.  I have restarted her Coreg her Cozaar and diuretics in order.  If her BNP is improved tomorrow breathing she can be discharged home.  Dr. Vega will take over her care tomorrow    Yamileth PIMENTEL    I, Quan Lenz MD,  personally performed the services described in this documentation as scribed by the above named individual in my presence, and it is both accurate and complete.  09/06/17 11:30 AM

## 2017-09-06 NOTE — H&P
Critical Care History & Physical    Patient name: Sylvia Jalloh  CSN: [id number]  MRN: [id number]  : 1934  Today's date: 2017    Primary Care Physician: Eddie Flanagan MD    Chief complaint:  Shortness of air    HPI: Mrs. Jalloh is a 82 y/o WF who presented to the ED with c/o shortness of breath that started this am.    She was just hospitalized her from 17-17 with systolic heart failure and takotsubo cardiomyopathy.  She had an ECHO 17 that showed an EF 21-30%.  She also had a LHC 17 that showed normal coronaries and EF 45-50%.  She was discharged home on maxide, cozaar and coreg.  She states she had been doing well.  Had a follow up with Dr. Vega 17 and was doing well at that time.  However, she ran out of her cardiac medications and couldn't get them filled since it was Labor Day weekend.  She has been 3 days without her medications.  She denies fever, chills, chest pain, N/V/D.  She states she has had some sinus drainage for the last few days, but was not short of air until this am at 3:30am.  She has had a cough with clear sputum. She called EMS.     In the ED, she had a temp 97.7, P 103, R 20, 188/111 and room air sat 78%.  She was given 325 mg ASA, 2 gms cefepime, 1 gm Vanc IV, NTG SL.  ABG on 60% bipap had pH 7.37, pCO2 36, pO2 63. She had a WBC 13.2, Hgb 13, HCT 42, plts 233, Cr 1.3, BUN 25, K 3.4, lactate 4.2, troponin 0.02, PCT <0.05, .      She states that she is still pretty stressed at home and grieving for her daughter.  She has been trying to send out thank you cards to people who helped her after she . She lives alone, but she has two sisters who check on her daily.  She also has a daughter that lives a mile away. She has a good support system.     PMH:   Past Medical History:   Diagnosis Date   • Chronic kidney disease    • Disease of thyroid gland    • Gallstone  pancreatitis    • Hypertension      PSH:   Past Surgical History:   Procedure Laterality Date   • APPENDECTOMY     • BLADDER SUSPENSION     • CARDIAC CATHETERIZATION N/A 2017    Procedure: Coronary angiography;  Surgeon: Thiago Vega MD;  Location: [facility] INVASIVE LOCATION;  Service:    • CHOLECYSTECTOMY     • HYSTERECTOMY       PFH:   Family History   Problem Relation Age of Onset   • Heart disease Mother    • Heart disease Father    • Early death Sister    • Heart disease Sister    • No Known Problems Brother    • Cancer Daughter      SH:   Social History     Social History   • Marital status:      Spouse name: N/A   • Number of children: N/A   • Years of education: N/A     Occupational History   • Retired      Social History Main Topics   • Smoking status: Never Smoker   • Smokeless tobacco: Never Used   • Alcohol use No   • Drug use: No   • Sexual activity: Defer     Other Topics Concern   • None     Social History Narrative    Daughter just  from cancer 2017, lots of stress.  She lives alone now, but has two sisters that check on her often and another daughter that lives a mile away.        Caffeine :0-1 servings per day.         Allergies:   Allergies   Allergen Reactions   • Ace Inhibitors Rash   • Latex Rash     Code Status:  Full Code    Home Medications:  Prescriptions Prior to Admission   Medication Sig Dispense Refill Last Dose   • carvedilol (COREG) 6.25 MG tablet Take 1 tablet by mouth 2 (Two) Times a Day With Meals. 60 tablet 11 Taking   • estradiol (ESTRACE) 1 MG tablet Take 1 mg by mouth Daily.   Taking   • hyoscyamine (LEVBID) 0.375 MG 12 hr tablet Take 0.375 mg by mouth Daily.   Taking   • levothyroxine (SYNTHROID, LEVOTHROID) 112 MCG tablet Take 112 mcg by mouth Daily.   Taking   • losartan (COZAAR) 100 MG tablet Take 1 tablet by mouth Daily. 30 tablet 11 Taking   • potassium chloride (K-DUR,KLOR-CON) 10 MEQ CR tablet Take 1 tablet by mouth Daily. 30 tablet 3 Taking  "  • triamterene-hydrochlorothiazide (MAXZIDE-25) 37.5-25 MG per tablet Take 1 tablet by mouth Daily.   Taking   • Vitamin D, Cholecalciferol, 1000 UNITS capsule Take 1,000 Units by mouth Daily.        Review of Systems  Negative systems except for what is noted in HPI     Vital Signs:  Blood pressure 162/74, pulse 97, temperature 98.1 °F (36.7 °C), temperature source Oral, resp. rate 18, height 60\" (152.4 cm), weight 120 lb 3.2 oz (54.5 kg), SpO2 94 %.    Physical Exam:  Constitutional:  Appears well-developed and well-nourished. No distress.   HEENT:  Normocephalic and atraumatic. PERRL  Neck:  Neck supple. No JVD present.   CV: Normal rate, regular rhythm, intact distal pulses.  No gallop, murmur or rub  Pulmonary/Chest: Effort normal and rales bilateral posterior from mid lung to apicies. No respiratory distress. No wheezes, rhonchi   Abdominal: Soft. +BS.  No distension and no mass. There is no tenderness.   Musculoskeletal: Normal muscle tone and strength  Neurological: Alert and oriented to person, place, and time.  No focal deficits  Skin: Skin is warm and dry. No rash noted.   Extremities:  No clubbing, edema or cyanosis  Psychiatric: Normal mood and affect. Behavior is normal.     Labs:    Results from last 7 days  Lab Units 09/06/17  0501   WBC 10*3/mm3 13.26*   HEMOGLOBIN g/dL 13.9   HEMATOCRIT % 42.1   PLATELETS 10*3/mm3 233       Results from last 7 days  Lab Units 09/06/17  0500 09/06/17  0456   CREATININE mg/dL  --  1.30   BILIRUBIN mg/dL 1.5*  --    ALK PHOS U/L 80  --    ALT (SGPT) U/L 18  --    AST (SGOT) U/L 29  --      Magnesium   Date Value Ref Range Status   09/06/2017 2.0 1.3 - 2.7 mg/dL Final     Phosphorus   Date Value Ref Range Status   09/06/2017 4.2 2.4 - 5.1 mg/dL Final     Imaging: CXR Done today shows patchy bilateral alveolar and interstitial disease with small bilateral pleural effusions suggesting heart failure though cannot rule out possible underlying infection.    ECHO: 7/28/17 " Left ventricular systolic function is moderately decreased. Estimated EF appears to be in the range of 21 - 30%. Left ventricular wall motion appears abnormal. There is hypokinesis of the septal and anterior segments. Left ventricular diastolic dysfunction (grade III) consistent with reversible restrictive pattern. Left atrial cavity size is borderline dilated. Mild aortic valve stenosis is present. The valve is severely calcified. Moderate mitral valve regurgitation is present. Mild pulmonic valve regurgitation is present. Mild tricuspid valve regurgitation is present. Calculated right ventricular systolic pressure from tricuspid regurgitation is moderately elevated at 45 mmHg.    LHC: 7/29/17 normal coronaries, EF 45-55%    ABG:     Results from last 7 days  Lab Units 09/06/17  0505   PH, ARTERIAL pH units 7.371   PO2 ART mm Hg 63.5*   PCO2, ARTERIAL mm Hg 36.4   HCO3 ART mmol/L 21.1   bipap 60%    Assessment:  Hospital Problem List     Hypothyroidism    Overview Deleted 9/6/2017  7:59 AM by LOREN Corbett            CKD (chronic kidney disease) stage 3, GFR 30-59 ml/min (Chronic)    Takotsubo cardiomyopathy w/ EF 21%    R/O Sepsis    Acute respiratory failure with hypoxia    Systolic heart failure    Elevated lactic acid level        Acute on chronic systolic congestive heart failure secondary to stopping medications.  Lactic acid was elevated and patient was hypertensive on admission.      Plan:   ICU admission  F/u cultures  Discontinue antibiotics now given lack of evidence for infectious etiology, low procalcitonin, lack of fever/purulent sputum etc.  Consult Cardiology, Dr. Vega who follows the patient for heart failure.   Diurese per Cards  Recheck lactate, if normalized, the patient can probably be transferred to telemetry soon.    LOREN Alexander, AGACNP-BC, FNP-BC  Pulmonary & Critical Care Medicine  Eitan Gomes MD    I performed an independent history and physical  examination. Portions of the history were obtained by APRN and were modified by me according to my findings. The above note reflects my findings, assessment, and plan.    Eitan Gomes MD    32 minutes of critical care provided personally by me. This time excludes other billable procedures. Time does include preparation of documents, medical consultations, review of old records, and direct bedside care. Patient was at high risk for life-threatening deterioration due to congestive heart failure and lactic acidosis.   Eitan Gomes MD

## 2017-09-06 NOTE — ED PROVIDER NOTES
Subjective   HPI Comments: 83-year-old female presents via EMS for evaluation of shortness of breath.  Of note, the patient was recently admitted to our facility for CHF.  This morning, approximately 30 minutes ago patient states that she began feeling acutely short of breath so she called EMS.  She does not wear home O2.  On EMS arrival, the patient's oxygen saturations were 53% on room air.  She was given supplemental oxygen and nebs and sats improved to 80s and route.  Patient denies any cough or fevers.  She denies any chest pain.      History provided by:  Patient and EMS personnel   used: No        Review of Systems   Respiratory: Positive for shortness of breath.    All other systems reviewed and are negative.      Past Medical History:   Diagnosis Date   • Chronic kidney disease    • Disease of thyroid gland    • Gallstone pancreatitis 2015   • Hypertension    • Never smoked any substance        Allergies   Allergen Reactions   • Ace Inhibitors Rash   • Latex Rash       Past Surgical History:   Procedure Laterality Date   • APPENDECTOMY     • BLADDER SUSPENSION     • CARDIAC CATHETERIZATION N/A 7/29/2017    Procedure: Coronary angiography;  Surgeon: Thiago Vega MD;  Location: [facility] INVASIVE LOCATION;  Service:    • CHOLECYSTECTOMY     • HYSTERECTOMY         Family History   Problem Relation Age of Onset   • Heart disease Mother    • Heart disease Father    • Early death Sister    • Heart disease Sister    • No Known Problems Brother    • Cancer Daughter        Social History     Social History   • Marital status:      Spouse name: N/A   • Number of children: N/A   • Years of education: N/A     Occupational History   • Retired      Social History Main Topics   • Smoking status: Never Smoker   • Smokeless tobacco: Never Used   • Alcohol use No   • Drug use: No   • Sexual activity: Defer     Other Topics Concern   • Not on file     Social History Narrative    Daughter just   from cancer 2 weeks ago, lots of stress.  Patient was her primary caregiver.        Caffeine :0-1 servings per day.    Patient lives at lone at  Her home           Objective   Physical Exam   Constitutional: She is oriented to person, place, and time. She appears well-developed and well-nourished.   Nontoxic appearing elderly female with increased work of breathing   HENT:   Head: Normocephalic and atraumatic.   Mouth/Throat: Oropharynx is clear and moist.   Eyes: EOM are normal. Pupils are equal, round, and reactive to light.   Neck: Normal range of motion. Neck supple. No JVD present.   Cardiovascular: Normal rate, regular rhythm and normal heart sounds.  Exam reveals no gallop and no friction rub.    No murmur heard.  Pulmonary/Chest: She is in respiratory distress. She has no wheezes. She has rales.   Tachypneic with mildly labored breathing; rales noted bilaterally; speaking in short sentences   Abdominal: Soft. Bowel sounds are normal. She exhibits no distension and no mass. There is no tenderness. There is no rebound and no guarding.   Musculoskeletal: Normal range of motion. She exhibits no edema.   Neurological: She is alert and oriented to person, place, and time.   GCS of 15; following commands in all fours   Skin: Skin is warm and dry. No rash noted. She is not diaphoretic. No erythema.   Psychiatric: She has a normal mood and affect. Judgment and thought content normal.   Nursing note and vitals reviewed.      Critical Care  Performed by: SHIMON ROSE  Authorized by: SHIMON ROSE   Total critical care time: 35 minutes  Critical care was necessary to treat or prevent imminent or life-threatening deterioration of the following conditions: cardiac failure and sepsis.  Critical care was time spent personally by me on the following activities: evaluation of patient's response to treatment, ordering and performing treatments and interventions, pulse oximetry, re-evaluation of patient's  condition, ordering and review of laboratory studies, examination of patient, discussions with consultants, obtaining history from patient or surrogate, ordering and review of radiographic studies and review of old charts.               ED Course  ED Course   Comment By Time   83-year-old female presents for evaluation of shortness of breath.  Of note, the patient was recently admitted to our facility for CHF.  Approximately 30 minutes ago, the patient began experiencing significant dyspnea and called EMS.  On their arrival, the patient was exhibiting increased work of breathing and had a room air sats of 53%.  She was given supplemental oxygen and nebs and sats improved to 80s en route.  On arrival to the ED, patient nontoxic appearing but tachypneic and hypoxic with increased work of breathing.  BiPAP initiated.  Rales noted bilaterally.  No JVD or significant peripheral edema.  We will obtain an EKG, chest x-ray, and labs and perform serial re-evaluations. Ronnie Stephens MD 09/06 5419   EKG normal sinus rhythm with heart rate of 98 and left bundle branch block (old compared to priors and negative by Sgarbossa's criteria). Ronnie Stephens MD 09/06 5472   Chest x-ray revealed bilateral patchy airspace opacities consistent with pneumonia versus CHF.  I feel that patient's dyspnea is likely multifactorial.  Given her recent hospitalization and lactate greater than 4, HAP coverage initiated.  IV fluids were withheld at this time as patient has concomitant CHF with low ejection fraction, and I feel that giving her 30 cc per KG of saline would be detrimental to her condition at this time. Ronnie Stephens MD 09/06 9383   Low-risk Wells and d-dimer elevated.  Chest CTA ordered. Ronnie Stephens MD 09/06 4336   Low-risk Wells and d-dimer elevated.  Given patient's poor renal function and inability to give significant amount of IV fluids to secondary to her CHF, I will defer CTA at this time and instead  ordered a VQ scan. Ronnie Stephens MD 09/06 0619   Upon reevaluation, patient's work of breathing markedly improved and BiPAP significantly improving her condition.  Discussed with Dr. Renteria and will admit to the ICU for further evaluation and treatment.  Patient hemodynamically stable and aware/agreeable with plan. Ronnie Stephens MD 09/06 0623          Recent Results (from the past 24 hour(s))   POC CHEM 8    Collection Time: 09/06/17  4:56 AM   Result Value Ref Range    Glucose 204 (H) 70 - 130 mg/dL    BUN, Arterial 25 8 - 26 mg/dL    Creatinine 1.30 0.60 - 1.30 mg/dL    Sodium 142 138 - 146 mmol/L    Potassium 3.4 (L) 3.5 - 4.9 mmol/L    Chloride 103 98 - 109 mmol/L    Total CO2 24 24 - 29 mmol/L    Hemoglobin 14.6 12.0 - 17.0 g/dL    Hematocrit 43 38 - 51 %    Ionized Calcium 1.19 (L) 1.20 - 1.32 mmol/L   Hepatic Function Panel    Collection Time: 09/06/17  5:00 AM   Result Value Ref Range    Total Protein 7.2 5.7 - 8.2 g/dL    Albumin 4.20 3.20 - 4.80 g/dL    ALT (SGPT) 18 7 - 40 U/L    AST (SGOT) 29 0 - 33 U/L    Alkaline Phosphatase 80 25 - 100 U/L    Total Bilirubin 1.5 (H) 0.3 - 1.2 mg/dL    Bilirubin, Direct 0.3 (H) 0.0 - 0.2 mg/dL    Bilirubin, Indirect 1.2 (H) 0.1 - 1.1 mg/dL   Magnesium    Collection Time: 09/06/17  5:00 AM   Result Value Ref Range    Magnesium 2.0 1.3 - 2.7 mg/dL   Procalcitonin    Collection Time: 09/06/17  5:00 AM   Result Value Ref Range    Procalcitonin <0.05 ng/mL   Lactic Acid, Plasma    Collection Time: 09/06/17  5:00 AM   Result Value Ref Range    Lactate 4.2 (C) 0.5 - 2.0 mmol/L   CBC Auto Differential    Collection Time: 09/06/17  5:01 AM   Result Value Ref Range    WBC 13.26 (H) 3.50 - 10.80 10*3/mm3    RBC 4.17 3.89 - 5.14 10*6/mm3    Hemoglobin 13.9 11.5 - 15.5 g/dL    Hematocrit 42.1 34.5 - 44.0 %    .0 (H) 80.0 - 99.0 fL    MCH 33.3 (H) 27.0 - 31.0 pg    MCHC 33.0 32.0 - 36.0 g/dL    RDW 13.8 11.3 - 14.5 %    RDW-SD 51.3 37.0 - 54.0 fl    MPV 10.4 6.0  - 12.0 fL    Platelets 233 150 - 450 10*3/mm3    Neutrophil % 68.7 41.0 - 71.0 %    Lymphocyte % 20.3 (L) 24.0 - 44.0 %    Monocyte % 7.9 0.0 - 12.0 %    Eosinophil % 2.3 0.0 - 3.0 %    Basophil % 0.6 0.0 - 1.0 %    Immature Grans % 0.2 0.0 - 0.6 %    Neutrophils, Absolute 9.10 (H) 1.50 - 8.30 10*3/mm3    Lymphocytes, Absolute 2.69 0.60 - 4.80 10*3/mm3    Monocytes, Absolute 1.05 (H) 0.00 - 1.00 10*3/mm3    Eosinophils, Absolute 0.31 (H) 0.00 - 0.30 10*3/mm3    Basophils, Absolute 0.08 0.00 - 0.20 10*3/mm3    Immature Grans, Absolute 0.03 0.00 - 0.03 10*3/mm3   POC Troponin, Rapid    Collection Time: 09/06/17  5:01 AM   Result Value Ref Range    Troponin I 0.02 0.00 - 0.07 ng/mL   Blood Gas, Arterial    Collection Time: 09/06/17  5:05 AM   Result Value Ref Range    Site Arterial: right radial     Lonnie's Test Positive     pH, Arterial 7.371 7.350 - 7.450 pH units    pCO2, Arterial 36.4 35.0 - 45.0 mm Hg    pO2, Arterial 63.5 (L) 83.0 - 108.0 mm Hg    HCO3, Arterial 21.1 20.0 - 26.0 mmol/L    Base Excess, Arterial -3.6 (L) 0.0 - 2.0 mmol/L    Hemoglobin, Blood Gas 13.8 (L) 14 - 18 g/dL    Hematocrit, Blood Gas 42.2 %    Oxyhemoglobin 89.4 (L) 94 - 99 %    Methemoglobin 0.80 0.00 - 1.50 %    Carboxyhemoglobin 1.9 0 - 2 %    CO2 Content 22.2 22 - 33    Barometric Pressure for Blood Gas  mmHg    Modality BiPap     FIO2 60 %   D-dimer, Quantitative    Collection Time: 09/06/17  5:35 AM   Result Value Ref Range    D-Dimer, Quantitative 1.66 (H) 0.00 - 0.50 mg/L (FEU)     Note: In addition to lab results from this visit, the labs listed above may include labs taken at another facility or during a different encounter within the last 24 hours. Please correlate lab times with ED admission and discharge times for further clarification of the services performed during this visit.    XR Chest 1 View   Final Result   Abnormal     Patchy airspace opacities noted within the upper and lower lungs, new since    the prior exam.  This  may represent developing pulmonary edema or infection.      THIS DOCUMENT HAS BEEN ELECTRONICALLY SIGNED BY BRANNON ESPINO MD      NM Lung Ventilation Perfusion    (Results Pending)     Vitals:    09/06/17 0504 09/06/17 0541 09/06/17 0542 09/06/17 0600   BP:   117/77 125/78   Patient Position:       Pulse: 100 86  83   Resp:       Temp:       TempSrc:       SpO2: 94% 94%  95%   Weight:       Height:         Medications   sodium chloride 0.9 % flush 10 mL (not administered)   nitroglycerin (NITROSTAT) SL tablet 0.4 mg (0.4 mg Sublingual Given 9/6/17 0505)   cefepime (MAXIPIME) 2 g/100 mL 0.9% NS (mbp) (not administered)   vancomycin (VANCOCIN) in iso-osmotic dextrose IVPB 1 g (premix) 200 mL (not administered)   aspirin tablet 325 mg (325 mg Oral Given 9/6/17 0511)     ECG/EMG Results (last 24 hours)     Procedure Component Value Units Date/Time    ECG 12 Lead [[id number]] Collected:  09/06/17 0501     Updated:  09/06/17 0501                [facility]    Final diagnoses:   Acute on chronic systolic congestive heart failure   HAP (hospital-acquired pneumonia)   Dyspnea, unspecified type   Leukocytosis, unspecified type            Ronnie Stephens MD  09/06/17 0673

## 2017-09-07 ENCOUNTER — APPOINTMENT (OUTPATIENT)
Dept: GENERAL RADIOLOGY | Facility: HOSPITAL | Age: 82
End: 2017-09-07

## 2017-09-07 LAB
ANION GAP SERPL CALCULATED.3IONS-SCNC: 8 MMOL/L (ref 3–11)
BH CV ECHO MEAS - AO MAX PG (FULL): 9.5 MMHG
BH CV ECHO MEAS - AO MAX PG: 14.5 MMHG
BH CV ECHO MEAS - AO MEAN PG (FULL): 5 MMHG
BH CV ECHO MEAS - AO MEAN PG: 8 MMHG
BH CV ECHO MEAS - AO ROOT AREA (BSA CORRECTED): 1.7
BH CV ECHO MEAS - AO ROOT AREA: 4.9 CM^2
BH CV ECHO MEAS - AO ROOT DIAM: 2.5 CM
BH CV ECHO MEAS - AO V2 MAX: 190 CM/SEC
BH CV ECHO MEAS - AO V2 MEAN: 133 CM/SEC
BH CV ECHO MEAS - AO V2 VTI: 34.2 CM
BH CV ECHO MEAS - AVA(I,A): 1.9 CM^2
BH CV ECHO MEAS - AVA(I,D): 1.9 CM^2
BH CV ECHO MEAS - AVA(V,A): 1.8 CM^2
BH CV ECHO MEAS - AVA(V,D): 1.8 CM^2
BH CV ECHO MEAS - BSA(HAYCOCK): 1.5 M^2
BH CV ECHO MEAS - BSA: 1.5 M^2
BH CV ECHO MEAS - BZI_BMI: 23.4 KILOGRAMS/M^2
BH CV ECHO MEAS - BZI_METRIC_HEIGHT: 152.4 CM
BH CV ECHO MEAS - BZI_METRIC_WEIGHT: 54.4 KG
BH CV ECHO MEAS - CONTRAST EF (2CH): 46.6 ML/M^2
BH CV ECHO MEAS - CONTRAST EF 4CH: 41.1 ML/M^2
BH CV ECHO MEAS - EDV(CUBED): 87.5 ML
BH CV ECHO MEAS - EDV(MOD-SP2): 118 ML
BH CV ECHO MEAS - EDV(MOD-SP4): 107 ML
BH CV ECHO MEAS - EDV(TEICH): 89.6 ML
BH CV ECHO MEAS - EF(CUBED): 14.1 %
BH CV ECHO MEAS - EF(TEICH): 11.3 %
BH CV ECHO MEAS - ESV(CUBED): 75.2 ML
BH CV ECHO MEAS - ESV(MOD-SP2): 63 ML
BH CV ECHO MEAS - ESV(MOD-SP4): 63 ML
BH CV ECHO MEAS - ESV(TEICH): 79.5 ML
BH CV ECHO MEAS - FS: 5 %
BH CV ECHO MEAS - IVS/LVPW: 0.97
BH CV ECHO MEAS - IVSD: 1 CM
BH CV ECHO MEAS - LA DIMENSION: 3.9 CM
BH CV ECHO MEAS - LA/AO: 1.6
BH CV ECHO MEAS - LAT PEAK E' VEL: 7.7 CM/SEC
BH CV ECHO MEAS - LV DIASTOLIC VOL/BSA (35-75): 71.2 ML/M^2
BH CV ECHO MEAS - LV MASS(C)D: 155.2 GRAMS
BH CV ECHO MEAS - LV MASS(C)DI: 103.3 GRAMS/M^2
BH CV ECHO MEAS - LV MAX PG: 4.9 MMHG
BH CV ECHO MEAS - LV MEAN PG: 3 MMHG
BH CV ECHO MEAS - LV SYSTOLIC VOL/BSA (12-30): 41.9 ML/M^2
BH CV ECHO MEAS - LV V1 MAX: 111 CM/SEC
BH CV ECHO MEAS - LV V1 MEAN: 72.6 CM/SEC
BH CV ECHO MEAS - LV V1 VTI: 20.2 CM
BH CV ECHO MEAS - LVIDD: 4.4 CM
BH CV ECHO MEAS - LVIDS: 4.2 CM
BH CV ECHO MEAS - LVLD AP2: 7.7 CM
BH CV ECHO MEAS - LVLD AP4: 7.8 CM
BH CV ECHO MEAS - LVLS AP2: 7.9 CM
BH CV ECHO MEAS - LVLS AP4: 7.6 CM
BH CV ECHO MEAS - LVOT AREA (M): 3.1 CM^2
BH CV ECHO MEAS - LVOT AREA: 3.1 CM^2
BH CV ECHO MEAS - LVOT DIAM: 2 CM
BH CV ECHO MEAS - LVPWD: 1 CM
BH CV ECHO MEAS - MED PEAK E' VEL: 7.7 CM/SEC
BH CV ECHO MEAS - MV A MAX VEL: 113 CM/SEC
BH CV ECHO MEAS - MV E MAX VEL: 86.4 CM/SEC
BH CV ECHO MEAS - MV E/A: 0.76
BH CV ECHO MEAS - MV MAX PG: 6.1 MMHG
BH CV ECHO MEAS - MV MEAN PG: 4 MMHG
BH CV ECHO MEAS - MV V2 MAX: 123.5 CM/SEC
BH CV ECHO MEAS - MV V2 MEAN: 90.1 CM/SEC
BH CV ECHO MEAS - MV V2 VTI: 22.9 CM
BH CV ECHO MEAS - MVA(VTI): 2.8 CM^2
BH CV ECHO MEAS - PA ACC SLOPE: 690.5 CM/SEC^2
BH CV ECHO MEAS - PA ACC TIME: 0.1 SEC
BH CV ECHO MEAS - PA MAX PG: 2.6 MMHG
BH CV ECHO MEAS - PA PR(ACCEL): 32.9 MMHG
BH CV ECHO MEAS - PA V2 MAX: 80.7 CM/SEC
BH CV ECHO MEAS - RAP SYSTOLE: 3 MMHG
BH CV ECHO MEAS - RVDD: 2.7 CM
BH CV ECHO MEAS - RVSP: 23 MMHG
BH CV ECHO MEAS - SI(AO): 111.8 ML/M^2
BH CV ECHO MEAS - SI(CUBED): 8.2 ML/M^2
BH CV ECHO MEAS - SI(LVOT): 42.2 ML/M^2
BH CV ECHO MEAS - SI(MOD-SP2): 36.6 ML/M^2
BH CV ECHO MEAS - SI(MOD-SP4): 29.3 ML/M^2
BH CV ECHO MEAS - SI(TEICH): 6.7 ML/M^2
BH CV ECHO MEAS - SV(AO): 168 ML
BH CV ECHO MEAS - SV(CUBED): 12.4 ML
BH CV ECHO MEAS - SV(LVOT): 63.5 ML
BH CV ECHO MEAS - SV(MOD-SP2): 55 ML
BH CV ECHO MEAS - SV(MOD-SP4): 44 ML
BH CV ECHO MEAS - SV(TEICH): 10.1 ML
BH CV ECHO MEAS - TAPSE (>1.6): 2.3 CM2
BH CV ECHO MEAS - TR MAX V: 20 MMHG
BH CV ECHO MEAS - TR MAX VEL: 224 CM/SEC
BH CV VAS BP RIGHT ARM: NORMAL MMHG
BH CV XLRA - RV BASE: 3.3 CM
BH CV XLRA - RV LENGTH: 7.7 CM
BH CV XLRA - RV MID: 2.1 CM
BH CV XLRA - TDI S': 12.1 CM/SEC
BNP SERPL-MCNC: 615 PG/ML (ref 0–100)
BUN BLD-MCNC: 25 MG/DL (ref 9–23)
BUN/CREAT SERPL: 19.2 (ref 7–25)
CALCIUM SPEC-SCNC: 8.9 MG/DL (ref 8.7–10.4)
CHLORIDE SERPL-SCNC: 101 MMOL/L (ref 99–109)
CO2 SERPL-SCNC: 27 MMOL/L (ref 20–31)
CREAT BLD-MCNC: 1.3 MG/DL (ref 0.6–1.3)
DEPRECATED RDW RBC AUTO: 49.4 FL (ref 37–54)
E/E' RATIO: 11.3
ERYTHROCYTE [DISTWIDTH] IN BLOOD BY AUTOMATED COUNT: 13.5 % (ref 11.3–14.5)
GFR SERPL CREATININE-BSD FRML MDRD: 39 ML/MIN/1.73
GLUCOSE BLD-MCNC: 108 MG/DL (ref 70–100)
HCT VFR BLD AUTO: 35 % (ref 34.5–44)
HGB BLD-MCNC: 11.6 G/DL (ref 11.5–15.5)
LEFT ATRIUM VOLUME INDEX: 54 ML/M2
LEFT ATRIUM VOLUME: 81 CM3
LV EF 2D ECHO EST: 30 %
MAGNESIUM SERPL-MCNC: 1.9 MG/DL (ref 1.3–2.7)
MCH RBC QN AUTO: 32.7 PG (ref 27–31)
MCHC RBC AUTO-ENTMCNC: 33.1 G/DL (ref 32–36)
MCV RBC AUTO: 98.6 FL (ref 80–99)
PHOSPHATE SERPL-MCNC: 2.9 MG/DL (ref 2.4–5.1)
PLATELET # BLD AUTO: 186 10*3/MM3 (ref 150–450)
PMV BLD AUTO: 10.6 FL (ref 6–12)
POTASSIUM BLD-SCNC: 4.3 MMOL/L (ref 3.5–5.5)
RBC # BLD AUTO: 3.55 10*6/MM3 (ref 3.89–5.14)
SODIUM BLD-SCNC: 136 MMOL/L (ref 132–146)
WBC NRBC COR # BLD: 11.25 10*3/MM3 (ref 3.5–10.8)

## 2017-09-07 PROCEDURE — 83880 ASSAY OF NATRIURETIC PEPTIDE: CPT | Performed by: INTERNAL MEDICINE

## 2017-09-07 PROCEDURE — 25010000002 MAGNESIUM SULFATE 2 GM/50ML SOLUTION: Performed by: NURSE PRACTITIONER

## 2017-09-07 PROCEDURE — 83735 ASSAY OF MAGNESIUM: CPT | Performed by: NURSE PRACTITIONER

## 2017-09-07 PROCEDURE — 80048 BASIC METABOLIC PNL TOTAL CA: CPT | Performed by: NURSE PRACTITIONER

## 2017-09-07 PROCEDURE — 71010 HC CHEST PA OR AP: CPT

## 2017-09-07 PROCEDURE — 99233 SBSQ HOSP IP/OBS HIGH 50: CPT | Performed by: INTERNAL MEDICINE

## 2017-09-07 PROCEDURE — 25010000002 HEPARIN (PORCINE) PER 1000 UNITS: Performed by: NURSE PRACTITIONER

## 2017-09-07 PROCEDURE — 85027 COMPLETE CBC AUTOMATED: CPT | Performed by: NURSE PRACTITIONER

## 2017-09-07 PROCEDURE — 25010000002 FUROSEMIDE PER 20 MG: Performed by: NURSE PRACTITIONER

## 2017-09-07 PROCEDURE — 84100 ASSAY OF PHOSPHORUS: CPT | Performed by: NURSE PRACTITIONER

## 2017-09-07 PROCEDURE — 99232 SBSQ HOSP IP/OBS MODERATE 35: CPT | Performed by: INTERNAL MEDICINE

## 2017-09-07 RX ORDER — FAMOTIDINE 20 MG/1
20 TABLET, FILM COATED ORAL DAILY
Status: DISCONTINUED | OUTPATIENT
Start: 2017-09-08 | End: 2017-09-08 | Stop reason: HOSPADM

## 2017-09-07 RX ORDER — MAGNESIUM SULFATE HEPTAHYDRATE 40 MG/ML
2 INJECTION, SOLUTION INTRAVENOUS ONCE
Status: COMPLETED | OUTPATIENT
Start: 2017-09-07 | End: 2017-09-07

## 2017-09-07 RX ADMIN — CARVEDILOL 6.25 MG: 6.25 TABLET, FILM COATED ORAL at 20:01

## 2017-09-07 RX ADMIN — ACETAMINOPHEN 650 MG: 325 TABLET, FILM COATED ORAL at 22:42

## 2017-09-07 RX ADMIN — FUROSEMIDE 40 MG: 10 INJECTION, SOLUTION INTRAMUSCULAR; INTRAVENOUS at 09:07

## 2017-09-07 RX ADMIN — CARVEDILOL 6.25 MG: 6.25 TABLET, FILM COATED ORAL at 09:06

## 2017-09-07 RX ADMIN — HEPARIN SODIUM 5000 UNITS: 5000 INJECTION, SOLUTION INTRAVENOUS; SUBCUTANEOUS at 20:00

## 2017-09-07 RX ADMIN — LOSARTAN POTASSIUM 100 MG: 50 TABLET, FILM COATED ORAL at 09:06

## 2017-09-07 RX ADMIN — LEVOTHYROXINE SODIUM 112 MCG: 112 TABLET ORAL at 06:10

## 2017-09-07 RX ADMIN — FAMOTIDINE 20 MG: 10 INJECTION INTRAVENOUS at 09:07

## 2017-09-07 RX ADMIN — HEPARIN SODIUM 5000 UNITS: 5000 INJECTION, SOLUTION INTRAVENOUS; SUBCUTANEOUS at 09:06

## 2017-09-07 RX ADMIN — MAGNESIUM SULFATE HEPTAHYDRATE 2 G: 40 INJECTION, SOLUTION INTRAVENOUS at 06:52

## 2017-09-07 NOTE — PROGRESS NOTES
Discharge Planning Assessment  [facility]     Patient Name: Sylvia Jalloh  MRN: [id number]  Today's Date: 9/7/2017    Admit Date: 9/6/2017          Discharge Needs Assessment       09/07/17 1203    Living Environment    Lives With alone    Living Arrangements house    Home Accessibility no concerns    Stair Railings at Home none    Type of Financial/Environmental Concern none    Transportation Available family or friend will provide    Living Environment    Provides Primary Care For no one    Quality Of Family Relationships supportive    Able to Return to Prior Living Arrangements yes    Discharge Needs Assessment    Concerns To Be Addressed denies needs/concerns at this time    Readmission Within The Last 30 Days no previous admission in last 30 days    Equipment Currently Used at Home shower chair    Equipment Needed After Discharge none            Discharge Plan       09/07/17 1204    Case Management/Social Work Plan    Plan Home    Patient/Family In Agreement With Plan yes    Additional Comments Spoke with patient at bedside.  Patient resides in [facility] and lives alone.  Prior to admission patient was independent with ADL's and mobility.  Patient has a shower chair but no other DME.  Patient has never used home health.  Patient states she has insurance with medication coverage.  Patient denies any needs at this time and her plan is to return home upon discharge.  CM will continue to follow.        Discharge Placement     No information found        Expected Discharge Date and Time     Expected Discharge Date Expected Discharge Time    Sep 11, 2017               Demographic Summary       09/07/17 1203    Referral Information    Admission Type inpatient    Arrived From home or self-care    Referral Source admission list    Reason For Consult discharge planning    Record Reviewed clinical discipline documentation;history and physical;medical record;patient profile    Contact Information     Permission Granted to Share Information With ;family/designee    Primary Care Physician Information    Name Eddie Flanagan            Functional Status     None            Psychosocial     None            Abuse/Neglect     None            Legal     None            Substance Abuse     None            Patient Forms     None          Garima Mallory RN

## 2017-09-07 NOTE — PROGRESS NOTES
Pulmonary/Critical Care Follow-up     LOS: 1 day   Patient Care Team:  Eddie Flanagan MD as PCP - General (Family Medicine)        Chief Complaint   Patient presents with   • Respiratory Distress     Subjective    Mrs. Jalloh is a 84 y/o WF who presented to the ED with c/o shortness of breath that started the morning of admission.     She was just hospitalized her from 17-17 with systolic heart failure.  She had an ECHO 17 that showed an EF 21-30%.  She also had a LHC 17 that showed normal coronaries and EF 45-50%.  She was discharged home on maxide, cozaar and coreg.  She states she had been doing well.  Had a follow up with Dr. Vega 17 and was doing well at that time.  However, she ran out of her cardiac medications and couldn't get them filled since it was Labor Day weekend.  She has been 3 days without her medications.  She denies fever, chills, chest pain, N/V/D.  She states she has had some sinus drainage for the last few days, but was not short of air until this am at 3:30am.  She has had a cough with clear sputum. She called EMS.      In the ED, she had a temp 97.7, P 103, R 20, 188/111 and room air sat 78%.  She was given 325 mg ASA, 2 gms cefepime, 1 gm Vanc IV, NTG SL.  ABG on 60% bipap had pH 7.37, pCO2 36, pO2 63. She had a WBC 13.2, Hgb 13, HCT 42, plts 233, Cr 1.3, BUN 25, K 3.4, lactate 4.2, troponin 0.02, PCT <0.05, .       She states that she is still pretty stressed at home and grieving for her daughter.  She has been trying to send out thank you cards to people who helped her after she . She lives alone, but she has two sisters who check on her daily.  She also has a daughter that lives a mile away. She has a good support system.     Interval History:   Patient reports she feels much better today.  She is sitting in a chair.  She reports appear she previously ambulated without difficulty.  She responded to diuresis.  She denies chest pain.  She  denies lower extremity edema.  No nausea or vomiting.  No new complaints.    History taken from: Patient, chart.    PMH/FH/Social History were reviewed and updated appropriately in the electronic medical record.     Review of Systems:    Review of 14 systems was completed with positives and pertinent negatives noted in the subjective section.  All other systems reviewed and are negative.         Objective     Vital Signs  Temp:  [98.3 °F (36.8 °C)-98.7 °F (37.1 °C)] 98.3 °F (36.8 °C)  Heart Rate:  [66-86] 76  Resp:  [16-20] 18  BP: (115-157)/(47-82) 148/78  09/06 0701 - 09/07 0700  In: 790 [P.O.:790]  Out: 3125 [Urine:3125]  Body mass index is 23.47 kg/(m^2).     Physical Exam:     Constitutional:    Alert, cooperative, in no acute distress   Head:    Normocephalic, without obvious abnormality, atraumatic   Eyes:            Lids and lashes normal, conjunctivae and sclerae normal, no   icterus, no pallor, corneas clear, PER   ENMT:   Ears appear intact with no abnormalities noted      No oral lesions, no thrush, oral mucosa moist   Neck:   No adenopathy, supple, trachea midline, no thyromegaly, no JVD       Lungs/Resp:     Normal effort, symmetric chest rise, no crepitus, Minimal basilar rales, no chest wall tenderness                Heart/CV:    Regular rhythm and normal rate, normal S1 and S2, no            murmur   Abdomen/GI:     Normal bowel sounds, no masses, no organomegaly, soft        non-tender, non-distended   :     Deferred   Extremities/MSK:   No clubbing or cyanosis.  No edema.  Normal tone.  No deformities.    Pulses:   Pulses palpable and equal bilaterally   Skin:   No bleeding, bruising or rash   Heme/Lymph:   No cervical or supraclavicular adenopathy.    Neurologic:    Psychiatric:       Moves all extremities with no obvious focal motor deficit.  Cranial nerves 2 - 12 grossly intact   Oriented x 3, normal affect.             Results Review:     I reviewed the patient's new clinical results.      Results from last 7 days  Lab Units 09/07/17 0334 09/06/17 2006 09/06/17 0500 09/06/17  0456   SODIUM mmol/L 136  --   --   --    POTASSIUM mmol/L 4.3 3.8  --   --    CHLORIDE mmol/L 101  --   --   --    CO2 mmol/L 27.0  --   --   --    BUN mg/dL 25*  --   --   --    CREATININE mg/dL 1.30  --   --  1.30   CALCIUM mg/dL 8.9  --   --   --    BILIRUBIN mg/dL  --   --  1.5*  --    ALK PHOS U/L  --   --  80  --    ALT (SGPT) U/L  --   --  18  --    AST (SGOT) U/L  --   --  29  --    GLUCOSE mg/dL 108*  --   --   --        Results from last 7 days  Lab Units 09/07/17 0334 09/06/17 0501 09/06/17 0456   WBC 10*3/mm3 11.25* 13.26*  --    HEMOGLOBIN g/dL 11.6 13.9  --    HEMOGLOBIN, POC g/dL  --   --  14.6   HEMATOCRIT % 35.0 42.1  --    HEMATOCRIT POC %  --   --  43   PLATELETS 10*3/mm3 186 233  --        Results from last 7 days  Lab Units 09/06/17  0505   PH, ARTERIAL pH units 7.371   PO2 ART mm Hg 63.5*   PCO2, ARTERIAL mm Hg 36.4   HCO3 ART mmol/L 21.1       Results from last 7 days  Lab Units 09/07/17 0334 09/06/17  0500   MAGNESIUM mg/dL 1.9 2.0   PHOSPHORUS mg/dL 2.9 4.2       I reviewed the patient's new imaging including images and reports.  V/Q scan from 9/6/2017 was low probability for pulmonary embolus but did show obstructive lung disease.    Chest x-ray done today: Improved bilateral interstitial opacities.  Rounded nodular appearing density in the right midlung.      Medication Review:     carvedilol 6.25 mg Oral Q12H   [START ON 9/8/2017] famotidine 20 mg Oral Daily   furosemide 40 mg Intravenous Daily   heparin (porcine) 5,000 Units Subcutaneous Q12H   levothyroxine 112 mcg Oral Q AM   losartan 100 mg Oral Q24H   pharmacy consult - MTM  Does not apply Daily          Assessment/Plan     Principal Problem:    Acute respiratory failure with hypoxia  Active Problems:    Essential hypertension    Hypothyroidism    Bundle branch block, left    CKD (chronic kidney disease) stage 3, GFR 30-59 ml/min     Acute systolic heart failure    Non-ischemic cardiomyopathy    R/O Sepsis    Elevated lactic acid level    83-year-old female admitted with acute respiratory failure from pulmonary edema with a history of CHF.     Patient is doing much better after management of her hypertension and congestive heart failure.    Plan:  1.  Obtain PA and lateral chest x-ray tomorrow a.m. for evaluation of possible right lung nodule.  2.  Continue management of congestive heart failure.  3.  Okay to telemetry.  4.  Cardiology following.  5.  Diuretics.       Eitan Gomes MD  09/07/17  5:38 PM        *. Please note that portions of this note were completed with a voice recognition program. Efforts were made to edit the dictations, but occasionally words are mistranscribed.

## 2017-09-07 NOTE — PROGRESS NOTES
"  [facility]   Inpatient Progress Note       LOS: 1 day   Patient Care Team:  Eddie Flanagan MD as PCP - General (Family Medicine)    Chief Complaint:  Follow-up for cardiomyopathy and heart failure    Subjective     Interval History:   Patient on BSC. States that breathing is feeling better. Was still hearing a \"raspy\" sound last evening.  Has not been ambulating yet.  Feeling is better.      Review of Systems:   Pertinent positives noted in history, exam, and assessment. Otherwise reviewed and negative.      Objective     Vitals:  Blood pressure 135/62, pulse 72, temperature 98.7 °F (37.1 °C), temperature source Axillary, resp. rate 18, height 60\" (152.4 cm), weight 120 lb 3.2 oz (54.5 kg), SpO2 99 %.     Intake/Output Summary (Last 24 hours) at 09/07/17 0900  Last data filed at 09/07/17 0700   Gross per 24 hour   Intake              790 ml   Output             2650 ml   Net            -1860 ml     Physical Exam   Constitutional: She is oriented to person, place, and time. She appears well-developed and well-nourished. No distress.   Neck: No JVD present. No tracheal deviation present.   Cardiovascular: Normal rate, regular rhythm and normal heart sounds.  Exam reveals no friction rub.    No murmur heard.  2/6 ANNIE   Pulmonary/Chest: Effort normal. No respiratory distress.   Decreased bases with faint basilar rales   Abdominal: Soft. Bowel sounds are normal. There is no tenderness.   Musculoskeletal: She exhibits no edema or deformity.   Neurological: She is alert and oriented to person, place, and time.   Skin: Skin is warm and dry.     I examined the patient and agree with the above     Results Review:     I reviewed the patient's new clinical results.      Results from last 7 days  Lab Units 09/07/17  0334   WBC 10*3/mm3 11.25*   HEMOGLOBIN g/dL 11.6   HEMATOCRIT % 35.0   PLATELETS 10*3/mm3 186       Results from last 7 days  Lab Units 09/07/17  0334  09/06/17  0500   SODIUM " "mmol/L 136  --   --    POTASSIUM mmol/L 4.3  < >  --    CHLORIDE mmol/L 101  --   --    CO2 mmol/L 27.0  --   --    BUN mg/dL 25*  --   --    CREATININE mg/dL 1.30  --   --    CALCIUM mg/dL 8.9  --   --    BILIRUBIN mg/dL  --   --  1.5*   ALK PHOS U/L  --   --  80   ALT (SGPT) U/L  --   --  18   AST (SGOT) U/L  --   --  29   GLUCOSE mg/dL 108*  --   --    < > = values in this interval not displayed.    Results from last 7 days  Lab Units 09/07/17  0334   SODIUM mmol/L 136   POTASSIUM mmol/L 4.3   CHLORIDE mmol/L 101   CO2 mmol/L 27.0   BUN mg/dL 25*   CREATININE mg/dL 1.30   GLUCOSE mg/dL 108*   CALCIUM mg/dL 8.9       Results from last 7 days  Lab Units 09/06/17  0500   INR  1.00       Lab Results  Lab Value Date/Time   TROPONINI 0.011 07/28/2017 0337   TROPONINI 0.015 07/27/2017 2101   TROPONINI 0.009 07/27/2017 1555                     Tele:  SR    Assessment/Plan     Principal Problem:    Acute respiratory failure with hypoxia  Active Problems:    Essential hypertension    Hypothyroidism    Bundle branch block, left    CKD (chronic kidney disease) stage 3, GFR 30-59 ml/min    Acute systolic heart failure    Non-ischemic cardiomyopathy    R/O Sepsis    Elevated lactic acid level      1. Acute systolic congestive heart failure  -Due to noncompliance (ran out of meds)  2. Hypertension  3. NICM/takotsubo  - echo LVEF 30-35%  4. Chronic kidney disease  5. LBBB, chronic      Plan:  Have resumed her \"at home medicines\".  She was doing well on these.  Agree with switch from Maxzide to Lasix IV and continue diuresis for now.  May transfer to telemetry.  Hopefully home in 24-48 hours  LOREN Jay  09/07/17  9:00 AM  IThiago have reviewed the note in full and agree with all aspects of the above including physical exam, assessment, labs and plan with changes made accordingly.     Thiago Vega MD  09/07/17  2:20 PM        Dictated utilizing Dragon dictation    "

## 2017-09-07 NOTE — PLAN OF CARE
Problem: Patient Care Overview (Adult)  Goal: Plan of Care Review  Outcome: Ongoing (interventions implemented as appropriate)    09/07/17 0643   Outcome Evaluation   Outcome Summary/Follow up Plan VSS throughout night on 2L NC, no SOA reported by pt. Pt had some c/o pain in bilat legs, tylenol given and much better.   Patient Care Overview   Progress improving       Goal: Adult Individualization and Mutuality  Outcome: Ongoing (interventions implemented as appropriate)  Goal: Discharge Needs Assessment  Outcome: Ongoing (interventions implemented as appropriate)    Problem: Cardiac: Heart Failure (Adult)  Goal: Signs and Symptoms of Listed Potential Problems Will be Absent or Manageable (Cardiac: Heart Failure)  Outcome: Ongoing (interventions implemented as appropriate)

## 2017-09-07 NOTE — PROGRESS NOTES
Multidisciplinary Rounds    Time: 20min  Patient Name: Sylvia Jalloh  Date of Encounter: 09/07/17 9:25 AM  MRN: [id number]  Admission date: 9/6/2017      Reason for visit: MDR. RD to continue to follow per protocol.     Current diet: Diet Regular; Thin; Cardiac      Intervention:  Follow treatment plan  Care plan reviewed    Follow up:   Per protocol      Laly Boggs RDN, LD  9:25 AM

## 2017-09-07 NOTE — PLAN OF CARE
Problem: Patient Care Overview (Adult)  Goal: Plan of Care Review  Outcome: Ongoing (interventions implemented as appropriate)    09/07/17 1600   Outcome Evaluation   Outcome Summary/Follow up Plan Mg replaced this morning. O2 weaned off, pt tolerating room air. Pt ambulated approx. 200 ft on room air with no shortness of air or drop in sats. Pt will likely go home tomorrow.    Patient Care Overview   Progress improving   Coping/Psychosocial Response Interventions   Plan Of Care Reviewed With patient

## 2017-09-08 ENCOUNTER — APPOINTMENT (OUTPATIENT)
Dept: GENERAL RADIOLOGY | Facility: HOSPITAL | Age: 82
End: 2017-09-08

## 2017-09-08 VITALS
HEART RATE: 75 BPM | RESPIRATION RATE: 16 BRPM | BODY MASS INDEX: 23.6 KG/M2 | HEIGHT: 60 IN | TEMPERATURE: 97.5 F | OXYGEN SATURATION: 97 % | SYSTOLIC BLOOD PRESSURE: 112 MMHG | DIASTOLIC BLOOD PRESSURE: 58 MMHG | WEIGHT: 120.2 LBS

## 2017-09-08 LAB
ANION GAP SERPL CALCULATED.3IONS-SCNC: 6 MMOL/L (ref 3–11)
BACTERIA SPEC AEROBE CULT: ABNORMAL
BUN BLD-MCNC: 29 MG/DL (ref 9–23)
BUN/CREAT SERPL: 20.7 (ref 7–25)
CALCIUM SPEC-SCNC: 8.5 MG/DL (ref 8.7–10.4)
CHLORIDE SERPL-SCNC: 102 MMOL/L (ref 99–109)
CO2 SERPL-SCNC: 26 MMOL/L (ref 20–31)
CREAT BLD-MCNC: 1.4 MG/DL (ref 0.6–1.3)
GFR SERPL CREATININE-BSD FRML MDRD: 36 ML/MIN/1.73
GLUCOSE BLD-MCNC: 94 MG/DL (ref 70–100)
MAGNESIUM SERPL-MCNC: 2.2 MG/DL (ref 1.3–2.7)
POTASSIUM BLD-SCNC: 3.4 MMOL/L (ref 3.5–5.5)
SODIUM BLD-SCNC: 134 MMOL/L (ref 132–146)

## 2017-09-08 PROCEDURE — 83735 ASSAY OF MAGNESIUM: CPT | Performed by: INTERNAL MEDICINE

## 2017-09-08 PROCEDURE — 25010000002 FUROSEMIDE PER 20 MG: Performed by: NURSE PRACTITIONER

## 2017-09-08 PROCEDURE — 80048 BASIC METABOLIC PNL TOTAL CA: CPT | Performed by: INTERNAL MEDICINE

## 2017-09-08 PROCEDURE — 25010000002 HEPARIN (PORCINE) PER 1000 UNITS: Performed by: NURSE PRACTITIONER

## 2017-09-08 PROCEDURE — 99239 HOSP IP/OBS DSCHRG MGMT >30: CPT | Performed by: INTERNAL MEDICINE

## 2017-09-08 PROCEDURE — 99232 SBSQ HOSP IP/OBS MODERATE 35: CPT | Performed by: INTERNAL MEDICINE

## 2017-09-08 PROCEDURE — 71020 HC CHEST PA AND LATERAL: CPT

## 2017-09-08 RX ORDER — POTASSIUM CHLORIDE 750 MG/1
20 TABLET, EXTENDED RELEASE ORAL DAILY
Qty: 60 TABLET | Refills: 3 | Status: SHIPPED | OUTPATIENT
Start: 2017-09-08 | End: 2017-10-11

## 2017-09-08 RX ORDER — FUROSEMIDE 40 MG/1
40 TABLET ORAL DAILY
Qty: 30 TABLET | Refills: 11 | Status: SHIPPED | OUTPATIENT
Start: 2017-09-08 | End: 2019-03-27

## 2017-09-08 RX ADMIN — HEPARIN SODIUM 5000 UNITS: 5000 INJECTION, SOLUTION INTRAVENOUS; SUBCUTANEOUS at 09:49

## 2017-09-08 RX ADMIN — POTASSIUM CHLORIDE 40 MEQ: 750 CAPSULE, EXTENDED RELEASE ORAL at 09:49

## 2017-09-08 RX ADMIN — LOSARTAN POTASSIUM 100 MG: 50 TABLET, FILM COATED ORAL at 09:48

## 2017-09-08 RX ADMIN — FAMOTIDINE 20 MG: 20 TABLET ORAL at 09:49

## 2017-09-08 RX ADMIN — FUROSEMIDE 40 MG: 10 INJECTION, SOLUTION INTRAMUSCULAR; INTRAVENOUS at 09:49

## 2017-09-08 RX ADMIN — CARVEDILOL 6.25 MG: 6.25 TABLET, FILM COATED ORAL at 09:49

## 2017-09-08 RX ADMIN — LEVOTHYROXINE SODIUM 112 MCG: 112 TABLET ORAL at 05:20

## 2017-09-08 NOTE — PROGRESS NOTES
Pulmonary/Critical Care Follow-up     LOS: 2 days   Patient Care Team:  Eddie Flanagan MD as PCP - General (Family Medicine)        Chief Complaint   Patient presents with   • Respiratory Distress     Subjective    Mrs. Jalloh is a 82 y/o WF who presented to the ED with c/o shortness of breath that started the morning of admission.     She was just hospitalized her from 17-17 with systolic heart failure.  She had an ECHO 17 that showed an EF 21-30%.  She also had a LHC 17 that showed normal coronaries and EF 45-50%.  She was discharged home on maxide, cozaar and coreg.  She states she had been doing well.  Had a follow up with Dr. Vega 17 and was doing well at that time.  However, she ran out of her cardiac medications and couldn't get them filled since it was Labor Day weekend.  She has been 3 days without her medications.  She denies fever, chills, chest pain, N/V/D.  She states she has had some sinus drainage for the last few days, but was not short of air until this am at 3:30am.  She has had a cough with clear sputum. She called EMS.      In the ED, she had a temp 97.7, P 103, R 20, 188/111 and room air sat 78%.  She was given 325 mg ASA, 2 gms cefepime, 1 gm Vanc IV, NTG SL.  ABG on 60% bipap had pH 7.37, pCO2 36, pO2 63. She had a WBC 13.2, Hgb 13, HCT 42, plts 233, Cr 1.3, BUN 25, K 3.4, lactate 4.2, troponin 0.02, PCT <0.05, .       She states that she is still pretty stressed at home and grieving for her daughter.  She has been trying to send out thank you cards to people who helped her after she . She lives alone, but she has two sisters who check on her daily.  She also has a daughter that lives a mile away. She has a good support system.     Interval History:   Patient continues to feel better, essentially back to baseline.  She is sitting in a chair.  She has ambulated without difficulty.  No chest pain.  No lower extremity swelling.  No nausea or  vomiting.  No new complaints.    History taken from: Patient, chart.    PMH/FH/Social History were reviewed and updated appropriately in the electronic medical record.     Review of Systems:    Review of 14 systems was completed with positives and pertinent negatives noted in the subjective section.  All other systems reviewed and are negative.         Objective     Vital Signs  Temp:  [97.5 °F (36.4 °C)-98.2 °F (36.8 °C)] 97.5 °F (36.4 °C)  Heart Rate:  [67-86] 78  Resp:  [16-18] 18  BP: (116-148)/() 116/52  09/07 0701 - 09/08 0700  In: 50 [I.V.:50]  Out: 1435 [Urine:1435]  Body mass index is 23.47 kg/(m^2).     Physical Exam:     Constitutional:    Alert, cooperative, in no acute distress   Head:    Normocephalic, without obvious abnormality, atraumatic   Eyes:            Lids and lashes normal, conjunctivae and sclerae normal, no   icterus, no pallor, corneas clear, PER   ENMT:   Ears appear intact with no abnormalities noted      No oral lesions, no thrush, oral mucosa moist   Neck:   No adenopathy, supple, trachea midline, no thyromegaly, no JVD       Lungs/Resp:     Normal effort, symmetric chest rise, no crepitus, clear to auscultation bilaterally, no chest wall tenderness                Heart/CV:    Regular rhythm and normal rate, normal S1 and S2, no            murmur   Abdomen/GI:     Normal bowel sounds, no masses, no organomegaly, soft        non-tender, non-distended   :     Deferred   Extremities/MSK:   No clubbing or cyanosis.  No edema.  Normal tone.  No deformities.    Pulses:   Pulses palpable and equal bilaterally   Skin:   No bleeding, bruising or rash   Heme/Lymph:   No cervical or supraclavicular adenopathy.    Neurologic:    Psychiatric:       Moves all extremities with no obvious focal motor deficit.  Cranial nerves 2 - 12 grossly intact   Oriented x 3, normal affect.             Results Review:     I reviewed the patient's new clinical results.     Results from last 7 days  Lab Units  09/08/17 0313 09/07/17  0334 09/06/17 2006 09/06/17  0500 09/06/17  0456   SODIUM mmol/L 134 136  --   --   --    POTASSIUM mmol/L 3.4* 4.3 3.8  --   --    CHLORIDE mmol/L 102 101  --   --   --    CO2 mmol/L 26.0 27.0  --   --   --    BUN mg/dL 29* 25*  --   --   --    CREATININE mg/dL 1.40* 1.30  --   --  1.30   CALCIUM mg/dL 8.5* 8.9  --   --   --    BILIRUBIN mg/dL  --   --   --  1.5*  --    ALK PHOS U/L  --   --   --  80  --    ALT (SGPT) U/L  --   --   --  18  --    AST (SGOT) U/L  --   --   --  29  --    GLUCOSE mg/dL 94 108*  --   --   --        Results from last 7 days  Lab Units 09/07/17 0334 09/06/17 0501 09/06/17  0456   WBC 10*3/mm3 11.25* 13.26*  --    HEMOGLOBIN g/dL 11.6 13.9  --    HEMOGLOBIN, POC g/dL  --   --  14.6   HEMATOCRIT % 35.0 42.1  --    HEMATOCRIT POC %  --   --  43   PLATELETS 10*3/mm3 186 233  --        Results from last 7 days  Lab Units 09/06/17  0505   PH, ARTERIAL pH units 7.371   PO2 ART mm Hg 63.5*   PCO2, ARTERIAL mm Hg 36.4   HCO3 ART mmol/L 21.1       Results from last 7 days  Lab Units 09/08/17 0313 09/07/17  0334 09/06/17  0500   MAGNESIUM mg/dL 2.2 1.9 2.0   PHOSPHORUS mg/dL  --  2.9 4.2       I reviewed the patient's new imaging including images and reports.  V/Q scan from 9/6/2017 was low probability for pulmonary embolus but did show obstructive lung disease.    Chest x-ray PA and lateral done today: Linear airspace opacity left base with effusion.  Dorsal degenerative disease.  Right midlung nodular opacity does not appear to be present on today's x-ray.      Medication Review:     carvedilol 6.25 mg Oral Q12H   famotidine 20 mg Oral Daily   furosemide 40 mg Intravenous Daily   heparin (porcine) 5,000 Units Subcutaneous Q12H   levothyroxine 112 mcg Oral Q AM   losartan 100 mg Oral Q24H   pharmacy consult - MTM  Does not apply Daily          Assessment/Plan     Principal Problem:    Acute respiratory failure with hypoxia  Active Problems:    Essential hypertension     Hypothyroidism    Bundle branch block, left    CKD (chronic kidney disease) stage 3, GFR 30-59 ml/min    Acute systolic heart failure    Non-ischemic cardiomyopathy    R/O Sepsis    Elevated lactic acid level    83-year-old female admitted with acute respiratory failure from pulmonary edema with a history of CHF.     Patient is doing much better after management of her hypertension and congestive heart failure.    Plan:  1.  Okay for discharge.  2.  Continue management of congestive heart failure.  3.  Cardiology following.  4.  Diuretics.    5.  Follow-up with Dr. Vega as scheduled.    Time: Discharge 35 min    Eitan Gomes MD  09/08/17  1:58 PM        *. Please note that portions of this note were completed with a voice recognition program. Efforts were made to edit the dictations, but occasionally words are mistranscribed.

## 2017-09-08 NOTE — DISCHARGE INSTR - APPOINTMENTS
Eddie Flanagan MD PCP - General Family Medicine [phone] [address] [place] [address]       Instructions: one week  Appointment on 09/15/17 at 12:00

## 2017-09-08 NOTE — PROGRESS NOTES
"  [facility]   Inpatient Progress Note       LOS: 2 days   Patient Care Team:  Eddie Flanagan MD as PCP - General (Family Medicine)    Chief Complaint:  Follow-up for cardiomyopathy and heart failure    Subjective     Interval History:     Patient notes that she is feeling much better today. Thinks she is ready to go home.  Patient states slept well last night, \"first night I didn't rattle\".    Review of Systems:   Pertinent positives noted in history, exam, and assessment. Otherwise reviewed and negative.      Objective     Vitals:  Blood pressure 126/52, pulse 69, temperature 97.8 °F (36.6 °C), temperature source Oral, resp. rate 16, height 60\" (152.4 cm), weight 120 lb 3.2 oz (54.5 kg), SpO2 95 %.     Intake/Output Summary (Last 24 hours) at 09/08/17 0934  Last data filed at 09/08/17 0600   Gross per 24 hour   Intake                0 ml   Output             1435 ml   Net            -1435 ml     Physical Exam   Constitutional: She is oriented to person, place, and time. She appears well-developed and well-nourished. No distress.   Neck: No JVD present. No tracheal deviation present.   Cardiovascular: Normal rate, regular rhythm and normal heart sounds.  Exam reveals no friction rub.    No murmur heard.  Pulmonary/Chest: Effort normal and breath sounds normal. No respiratory distress.   Abdominal: Soft. Bowel sounds are normal. There is no tenderness.   Musculoskeletal: She exhibits no edema or deformity.   Neurological: She is alert and oriented to person, place, and time.   Skin: Skin is warm and dry.     I have examined the patient and agree with the above     Results Review:     I reviewed the patient's new clinical results.      Results from last 7 days  Lab Units 09/07/17  0334   WBC 10*3/mm3 11.25*   HEMOGLOBIN g/dL 11.6   HEMATOCRIT % 35.0   PLATELETS 10*3/mm3 186       Results from last 7 days  Lab Units 09/08/17  0313  09/06/17  0500   SODIUM mmol/L 134  < >  --  "   POTASSIUM mmol/L 3.4*  < >  --    CHLORIDE mmol/L 102  < >  --    CO2 mmol/L 26.0  < >  --    BUN mg/dL 29*  < >  --    CREATININE mg/dL 1.40*  < >  --    CALCIUM mg/dL 8.5*  < >  --    BILIRUBIN mg/dL  --   --  1.5*   ALK PHOS U/L  --   --  80   ALT (SGPT) U/L  --   --  18   AST (SGOT) U/L  --   --  29   GLUCOSE mg/dL 94  < >  --    < > = values in this interval not displayed.    Results from last 7 days  Lab Units 09/08/17  0313   SODIUM mmol/L 134   POTASSIUM mmol/L 3.4*   CHLORIDE mmol/L 102   CO2 mmol/L 26.0   BUN mg/dL 29*   CREATININE mg/dL 1.40*   GLUCOSE mg/dL 94   CALCIUM mg/dL 8.5*       Results from last 7 days  Lab Units 09/06/17  0500   INR  1.00       Lab Results  Lab Value Date/Time   TROPONINI 0.011 07/28/2017 0337   TROPONINI 0.015 07/27/2017 2101   TROPONINI 0.009 07/27/2017 1555                     Tele:  SR    Assessment/Plan     Principal Problem:    Acute respiratory failure with hypoxia  Active Problems:    Essential hypertension    Hypothyroidism    Bundle branch block, left    CKD (chronic kidney disease) stage 3, GFR 30-59 ml/min    Acute systolic heart failure    Non-ischemic cardiomyopathy    R/O Sepsis    Elevated lactic acid level      1. Acute systolic congestive heart failure  -Due to noncompliance (ran out of meds)  2. Hypertension  3. NICM/takotsubo  - echo LVEF 30-35%  4. Chronic kidney disease  5. LBBB, chronic      Plan:   Discharge home today.He is euvolemic ready for discharge    Suzanne Peoples, LOREN  09/08/17  9:34 AM    IThiago have reviewed the note in full and agree with all aspects of the above including physical exam, assessment, labs and plan with changes made accordingly.     Thiago Vega MD  09/08/17  11:15 AM          Dictated utilizing Dragon dictation

## 2017-09-08 NOTE — DISCHARGE INSTR - LAB
Appt with Dr. Vega at his [place] office   October 11, 2017 at 11:30.       Eddie Flanagan MD PCP - General Family Medicine [phone] [address] [place] [id number]       Instructions: one week  Appointment on 09/15/17 at 12:00

## 2017-09-08 NOTE — DISCHARGE SUMMARY
Discharge Summary    Patient name: Sylvia Jalloh  CSN: [id number]  MRN: [id number]  : 1934  Today's date: 2017     Date of Admission: 2017  Date of Discharge:  2017    Admitting Physician:  Donell Gomes  Primary Care Provider: Eddie Flanagan MD  Consultations:   Cardiology:  Dr. Lenz    Admission Diagnosis:  Respiratory failure     Discharge Diagnoses:   Hospital Problem List     * (Principal)Acute respiratory failure with hypoxia    R/O Sepsis    Non-ischemic cardiomyopathy    Overview Addendum 2017 11:19 AM by LOREN Mullen     · Echo (2017): LVEF 35%.  · Echo (2017): LVEF 21 - 30%. Hypokinesis of the septal and anterior segments. Grade II diastolic dysfunction. Mild AS. Moderate MR. Mild MD and TR. RVSP= 45 mmHg  · Cardiac cath (2017): Normal coronary arteries. LVEF 45-50%         Elevated lactic acid level    Hypothyroidism    Overview Deleted 2017  7:59 AM by LOREN Corbett            CKD (chronic kidney disease) stage 3, GFR 30-59 ml/min (Chronic)    Essential hypertension (Chronic)    Bundle branch block, left (Chronic)    Overview Addendum 2017 11:20 AM by LOREN Mullen     · Known, not new.  Cardiomyopathy likely due to chronic LBBB with dyssynchronization of right and left ventricles.           Acute systolic heart failure    Overview Signed 2017 11:35 AM by Thiago Vega MD     Added automatically from request for surgery [id number]             Allergies:  Ace inhibitors and Latex    Code Status:  Full Code    Procedures:  17 ECHO - Left ventricular systolic function is moderately decreased. Estimated EF = 30%. Mild mitral valve regurgitation is present     History of Present Illness:  Mrs. Jalloh is a 84 y/o WF who presented to the ED with c/o shortness of breath that started this am.     She was just hospitalized her from 17-17 with systolic heart failure and takotsubo cardiomyopathy.  " She had an ECHO 17 that showed an EF 21-30%.  She also had a LHC 17 that showed normal coronaries and EF 45-50%.  She was discharged home on maxide, cozaar and coreg.  She states she had been doing well.  Had a follow up with Dr. Vega 17 and was doing well at that time.  However, she ran out of her cardiac medications and couldn't get them filled since it was Labor Day weekend.  She has been 3 days without her medications.  She denies fever, chills, chest pain, N/V/D.  She states she has had some sinus drainage for the last few days, but was not short of air until this am at 3:30am.  She has had a cough with clear sputum. She called EMS.      In the ED, she had a temp 97.7, P 103, R 20, 188/111 and room air sat 78%.  She was given 325 mg ASA, 2 gms cefepime, 1 gm Vanc IV, NTG SL.  ABG on 60% bipap had pH 7.37, pCO2 36, pO2 63. She had a WBC 13.2, Hgb 13, HCT 42, plts 233, Cr 1.3, BUN 25, K 3.4, lactate 4.2, troponin 0.02, PCT <0.05, .       She states that she is still pretty stressed at home and grieving for her daughter.  She has been trying to send out thank you cards to people who helped her after she . She lives alone, but she has two sisters who check on her daily.  She also has a daughter that lives a mile away. She has a good support system.     Hospital Course:  She was restarted on some of her home medications.  Maxide was changed to lasix.  She had an ECHO with results above.  She had a V/Q scan that showed abnormal ventilation/perfusion lung scan demonstrating extreme obstructive and retentive lung disease and low probability for PE.  She is now on room air.  Feels back to baseline.  She is eating and ambulating well.  It is felt at this time that she is stable to go home.       Vitals:  /52 (BP Location: Left arm)  Pulse 78  Temp 97.5 °F (36.4 °C) (Axillary)   Resp 18  Ht 60\" (152.4 cm)  Wt 120 lb 3.2 oz (54.5 kg)  SpO2 99%  BMI 23.47 kg/m2    Physical " Exam:  Constitutional:  Appears well-developed and well-nourished. No distress.   HEENT:  Normocephalic and atraumatic. PERRL  Neck:  Neck supple. No JVD present.   CV: Normal rate, regular rhythm,  intact distal pulses.  No gallop, murmur or rub.  Pulmonary/Chest: Effort normal and breath sounds normal. No respiratory distress. No wheezes, rhonchi or rales.   Abdominal: Soft. +BS. No distension and no mass. There is no tenderness.   Musculoskeletal: Normal muscle tone and strength  Neurological: Alert and oriented to person, place, and time.  No focal deficits  Skin: Skin is warm and dry. No rash noted.   Extremities:  No clubbing, edema or cyanosis  Psychiatric: Normal mood and affect. Behavior is normal.     Labs:    Results from last 7 days  Lab Units 09/07/17  0334   WBC 10*3/mm3 11.25*   HEMOGLOBIN g/dL 11.6   HEMATOCRIT % 35.0   PLATELETS 10*3/mm3 186       Results from last 7 days  Lab Units 09/08/17  0313  09/06/17  0500   SODIUM mmol/L 134  < >  --    POTASSIUM mmol/L 3.4*  < >  --    CHLORIDE mmol/L 102  < >  --    CO2 mmol/L 26.0  < >  --    BUN mg/dL 29*  < >  --    CREATININE mg/dL 1.40*  < >  --    CALCIUM mg/dL 8.5*  < >  --    BILIRUBIN mg/dL  --   --  1.5*   ALK PHOS U/L  --   --  80   ALT (SGPT) U/L  --   --  18   AST (SGOT) U/L  --   --  29   GLUCOSE mg/dL 94  < >  --    < > = values in this interval not displayed.      Magnesium   Date Value Ref Range Status   09/08/2017 2.2 1.3 - 2.7 mg/dL Final   09/07/2017 1.9 1.3 - 2.7 mg/dL Final   09/06/2017 2.0 1.3 - 2.7 mg/dL Final     Phosphorus   Date Value Ref Range Status   09/07/2017 2.9 2.4 - 5.1 mg/dL Final   09/06/2017 4.2 2.4 - 5.1 mg/dL Final               Discharge Medications:   Sylvia Jalloh   Home Medication Instructions MORIAH:[id number]    Printed on:09/08/17 1435   Medication Information                      carvedilol (COREG) 6.25 MG tablet  Take 1 tablet by mouth 2 (Two) Times a Day With Meals.             estradiol (ESTRACE) 1  MG tablet  Take 1 mg by mouth Daily.             furosemide (LASIX) 40 MG tablet  Take 1 tablet by mouth Daily.             levothyroxine (SYNTHROID, LEVOTHROID) 112 MCG tablet  Take 112 mcg by mouth Daily.             losartan (COZAAR) 100 MG tablet  Take 1 tablet by mouth Daily.             potassium chloride (K-DUR,KLOR-CON) 10 MEQ CR tablet  Take 2 tablets by mouth Daily.             Vitamin D, Cholecalciferol, 1000 UNITS capsule  Take 1,000 Units by mouth Daily.               Discharge Diet:   Diet Instructions     Diet: Regular, Cardiac; Thin       Discharge Diet:   Regular  Cardiac      Fluid Consistency:  Thin               Activity at Discharge:    Activity Instructions     Activity as Tolerated                   Follow-up Appointments  Future Appointments  Date Time Provider Department Center   10/11/2017 11:30 AM Thiago Vega MD [facility] None   3/6/2018 2:30 PM Thiago Vega MD [facility] None     Additional Instructions for the Follow-ups that You Need to Schedule     Discharge Follow-up with PCP    As directed    Follow Up Details:  one week       Discharge Follow-up with Specialty    As directed    Specialty:  Dr. Vega   Follow Up:  1 Month   Has the patient’s follow-up appointment been scheduled and documented in the discharge navigator?:  Yes, documented in the appointment section           Additional information on Labs and Follow-ups:      Appt with Dr. Vega at his [place] office   October 11, 2018 at 11:30.                 Discharge Instructions:  Ok to go home today  Follow up as above  Scripts sent electronically     LOREN Palacios, AGACNP-BC, FNP-BC  Pulmonary & Critical Care Medicine    Time: Discharge 45 min    CC: Eddie Flanagan MD

## 2017-09-08 NOTE — PROGRESS NOTES
Multidisciplinary Rounds    Time: 20min  Patient Name: Sylvia Jalloh  Date of Encounter: 09/08/17 10:05 AM  MRN: [id number]  Admission date: 9/6/2017      Reason for visit: MDR. RD to continue to follow per protocol.     Additional information obtained during MDR: May send home today.     Current diet: Diet Regular; Thin; Cardiac      Intervention:  Follow treatment plan  Care plan reviewed    Follow up:   Per protocol      Laly Boggs RDN, LD  10:05 AM

## 2017-09-08 NOTE — PLAN OF CARE
Problem: Patient Care Overview (Adult)  Goal: Plan of Care Review  Outcome: Ongoing (interventions implemented as appropriate)    09/08/17 0542   Outcome Evaluation   Outcome Summary/Follow up Plan VSS. Pt tolerating room air. Pt slept well throughout evening. Likely to go home today.    Patient Care Overview   Progress improving   Coping/Psychosocial Response Interventions   Plan Of Care Reviewed With patient       Goal: Adult Individualization and Mutuality  Outcome: Ongoing (interventions implemented as appropriate)  Goal: Discharge Needs Assessment  Outcome: Ongoing (interventions implemented as appropriate)    Problem: Cardiac: Heart Failure (Adult)  Goal: Signs and Symptoms of Listed Potential Problems Will be Absent or Manageable (Cardiac: Heart Failure)  Outcome: Ongoing (interventions implemented as appropriate)

## 2017-09-11 LAB
BACTERIA SPEC AEROBE CULT: NORMAL
BACTERIA SPEC AEROBE CULT: NORMAL

## 2017-09-21 ENCOUNTER — APPOINTMENT (OUTPATIENT)
Dept: GENERAL RADIOLOGY | Facility: HOSPITAL | Age: 82
End: 2017-09-21

## 2017-09-21 ENCOUNTER — HOSPITAL ENCOUNTER (INPATIENT)
Facility: HOSPITAL | Age: 82
LOS: 3 days | Discharge: HOME | End: 2017-09-24
Attending: EMERGENCY MEDICINE | Admitting: HOSPITALIST

## 2017-09-21 DIAGNOSIS — I42.8 NON-ISCHEMIC CARDIOMYOPATHY (HCC): ICD-10-CM

## 2017-09-21 DIAGNOSIS — I10 ESSENTIAL HYPERTENSION: Chronic | ICD-10-CM

## 2017-09-21 DIAGNOSIS — I50.9 ACUTE DECOMPENSATED HEART FAILURE (HCC): ICD-10-CM

## 2017-09-21 DIAGNOSIS — I50.21 ACUTE SYSTOLIC HEART FAILURE (HCC): ICD-10-CM

## 2017-09-21 DIAGNOSIS — J96.01 ACUTE HYPOXEMIC RESPIRATORY FAILURE (HCC): Primary | ICD-10-CM

## 2017-09-21 DIAGNOSIS — J81.0 FLASH PULMONARY EDEMA (HCC): ICD-10-CM

## 2017-09-21 PROBLEM — R79.89 ELEVATED LACTIC ACID LEVEL: Status: RESOLVED | Noted: 2017-09-06 | Resolved: 2017-09-21

## 2017-09-21 PROBLEM — A41.9 SEPSIS (HCC): Status: RESOLVED | Noted: 2017-09-06 | Resolved: 2017-09-21

## 2017-09-21 LAB
ALBUMIN SERPL-MCNC: 3.9 G/DL (ref 3.2–4.8)
ALBUMIN/GLOB SERPL: 1.3 G/DL (ref 1.5–2.5)
ALP SERPL-CCNC: 88 U/L (ref 25–100)
ALT SERPL W P-5'-P-CCNC: 22 U/L (ref 7–40)
ANION GAP SERPL CALCULATED.3IONS-SCNC: 11 MMOL/L (ref 3–11)
AST SERPL-CCNC: 30 U/L (ref 0–33)
BASOPHILS # BLD AUTO: 0.14 10*3/MM3 (ref 0–0.2)
BASOPHILS NFR BLD AUTO: 0.9 % (ref 0–1)
BILIRUB SERPL-MCNC: 1.3 MG/DL (ref 0.3–1.2)
BNP SERPL-MCNC: 1049 PG/ML (ref 0–100)
BUN BLD-MCNC: 18 MG/DL (ref 9–23)
BUN/CREAT SERPL: 13.8 (ref 7–25)
CALCIUM SPEC-SCNC: 8.7 MG/DL (ref 8.7–10.4)
CHLORIDE SERPL-SCNC: 104 MMOL/L (ref 99–109)
CO2 SERPL-SCNC: 23 MMOL/L (ref 20–31)
CREAT BLD-MCNC: 1.3 MG/DL (ref 0.6–1.3)
DEPRECATED RDW RBC AUTO: 52.4 FL (ref 37–54)
EOSINOPHIL # BLD AUTO: 0.49 10*3/MM3 (ref 0–0.3)
EOSINOPHIL NFR BLD AUTO: 3.2 % (ref 0–3)
ERYTHROCYTE [DISTWIDTH] IN BLOOD BY AUTOMATED COUNT: 14 % (ref 11.3–14.5)
GFR SERPL CREATININE-BSD FRML MDRD: 39 ML/MIN/1.73
GLOBULIN UR ELPH-MCNC: 2.9 GM/DL
GLUCOSE BLD-MCNC: 269 MG/DL (ref 70–100)
HCT VFR BLD AUTO: 39.2 % (ref 34.5–44)
HGB BLD-MCNC: 13.1 G/DL (ref 11.5–15.5)
HOLD SPECIMEN: NORMAL
HOLD SPECIMEN: NORMAL
IMM GRANULOCYTES # BLD: 0.06 10*3/MM3 (ref 0–0.03)
IMM GRANULOCYTES NFR BLD: 0.4 % (ref 0–0.6)
LYMPHOCYTES # BLD AUTO: 4.39 10*3/MM3 (ref 0.6–4.8)
LYMPHOCYTES NFR BLD AUTO: 28.6 % (ref 24–44)
MCH RBC QN AUTO: 34.4 PG (ref 27–31)
MCHC RBC AUTO-ENTMCNC: 33.4 G/DL (ref 32–36)
MCV RBC AUTO: 102.9 FL (ref 80–99)
MONOCYTES # BLD AUTO: 1.37 10*3/MM3 (ref 0–1)
MONOCYTES NFR BLD AUTO: 8.9 % (ref 0–12)
NEUTROPHILS # BLD AUTO: 8.9 10*3/MM3 (ref 1.5–8.3)
NEUTROPHILS NFR BLD AUTO: 58 % (ref 41–71)
PLATELET # BLD AUTO: 295 10*3/MM3 (ref 150–450)
PMV BLD AUTO: 10.8 FL (ref 6–12)
POTASSIUM BLD-SCNC: 4.1 MMOL/L (ref 3.5–5.5)
PROT SERPL-MCNC: 6.8 G/DL (ref 5.7–8.2)
RBC # BLD AUTO: 3.81 10*6/MM3 (ref 3.89–5.14)
SODIUM BLD-SCNC: 138 MMOL/L (ref 132–146)
TROPONIN I SERPL-MCNC: 0.01 NG/ML (ref 0–0.07)
WBC NRBC COR # BLD: 15.35 10*3/MM3 (ref 3.5–10.8)
WHOLE BLOOD HOLD SPECIMEN: NORMAL
WHOLE BLOOD HOLD SPECIMEN: NORMAL

## 2017-09-21 PROCEDURE — 25010000002 ONDANSETRON PER 1 MG: Performed by: EMERGENCY MEDICINE

## 2017-09-21 PROCEDURE — 99222 1ST HOSP IP/OBS MODERATE 55: CPT | Performed by: INTERNAL MEDICINE

## 2017-09-21 PROCEDURE — 99285 EMERGENCY DEPT VISIT HI MDM: CPT

## 2017-09-21 PROCEDURE — 25010000002 FUROSEMIDE PER 20 MG: Performed by: NURSE PRACTITIONER

## 2017-09-21 PROCEDURE — 25010000002 HEPARIN (PORCINE) PER 1000 UNITS: Performed by: HOSPITALIST

## 2017-09-21 PROCEDURE — 25010000002 FUROSEMIDE PER 20 MG: Performed by: HOSPITALIST

## 2017-09-21 PROCEDURE — 94799 UNLISTED PULMONARY SVC/PX: CPT

## 2017-09-21 PROCEDURE — 84484 ASSAY OF TROPONIN QUANT: CPT

## 2017-09-21 PROCEDURE — 71010 HC CHEST PA OR AP: CPT

## 2017-09-21 PROCEDURE — 94660 CPAP INITIATION&MGMT: CPT

## 2017-09-21 PROCEDURE — 83880 ASSAY OF NATRIURETIC PEPTIDE: CPT | Performed by: EMERGENCY MEDICINE

## 2017-09-21 PROCEDURE — 85025 COMPLETE CBC W/AUTO DIFF WBC: CPT | Performed by: EMERGENCY MEDICINE

## 2017-09-21 PROCEDURE — 93005 ELECTROCARDIOGRAM TRACING: CPT | Performed by: EMERGENCY MEDICINE

## 2017-09-21 PROCEDURE — 80053 COMPREHEN METABOLIC PANEL: CPT | Performed by: EMERGENCY MEDICINE

## 2017-09-21 PROCEDURE — 99223 1ST HOSP IP/OBS HIGH 75: CPT | Performed by: HOSPITALIST

## 2017-09-21 RX ORDER — ONDANSETRON 2 MG/ML
4 INJECTION INTRAMUSCULAR; INTRAVENOUS EVERY 6 HOURS PRN
Status: DISCONTINUED | OUTPATIENT
Start: 2017-09-21 | End: 2017-09-24 | Stop reason: HOSPADM

## 2017-09-21 RX ORDER — FUROSEMIDE 10 MG/ML
40 INJECTION INTRAMUSCULAR; INTRAVENOUS EVERY 12 HOURS
Status: DISCONTINUED | OUTPATIENT
Start: 2017-09-21 | End: 2017-09-24 | Stop reason: HOSPADM

## 2017-09-21 RX ORDER — MAGNESIUM SULFATE HEPTAHYDRATE 40 MG/ML
4 INJECTION, SOLUTION INTRAVENOUS AS NEEDED
Status: DISCONTINUED | OUTPATIENT
Start: 2017-09-21 | End: 2017-09-24 | Stop reason: HOSPADM

## 2017-09-21 RX ORDER — FUROSEMIDE 40 MG/1
40 TABLET ORAL DAILY
Status: DISCONTINUED | OUTPATIENT
Start: 2017-09-22 | End: 2017-09-21

## 2017-09-21 RX ORDER — ONDANSETRON 2 MG/ML
4 INJECTION INTRAMUSCULAR; INTRAVENOUS ONCE
Status: COMPLETED | OUTPATIENT
Start: 2017-09-21 | End: 2017-09-21

## 2017-09-21 RX ORDER — FUROSEMIDE 10 MG/ML
40 INJECTION INTRAMUSCULAR; INTRAVENOUS ONCE
Status: COMPLETED | OUTPATIENT
Start: 2017-09-21 | End: 2017-09-21

## 2017-09-21 RX ORDER — CARVEDILOL 12.5 MG/1
12.5 TABLET ORAL 2 TIMES DAILY WITH MEALS
Status: DISCONTINUED | OUTPATIENT
Start: 2017-09-22 | End: 2017-09-23

## 2017-09-21 RX ORDER — HYDRALAZINE HYDROCHLORIDE 25 MG/1
25 TABLET, FILM COATED ORAL EVERY 8 HOURS SCHEDULED
Status: DISCONTINUED | OUTPATIENT
Start: 2017-09-21 | End: 2017-09-22

## 2017-09-21 RX ORDER — ESTRADIOL 0.5 MG/1
1 TABLET ORAL DAILY
Status: DISCONTINUED | OUTPATIENT
Start: 2017-09-21 | End: 2017-09-24 | Stop reason: HOSPADM

## 2017-09-21 RX ORDER — POTASSIUM CHLORIDE 750 MG/1
40 CAPSULE, EXTENDED RELEASE ORAL AS NEEDED
Status: DISCONTINUED | OUTPATIENT
Start: 2017-09-21 | End: 2017-09-24 | Stop reason: HOSPADM

## 2017-09-21 RX ORDER — MAGNESIUM SULFATE HEPTAHYDRATE 40 MG/ML
2 INJECTION, SOLUTION INTRAVENOUS AS NEEDED
Status: DISCONTINUED | OUTPATIENT
Start: 2017-09-21 | End: 2017-09-24 | Stop reason: HOSPADM

## 2017-09-21 RX ORDER — HEPARIN SODIUM 5000 [USP'U]/ML
5000 INJECTION, SOLUTION INTRAVENOUS; SUBCUTANEOUS EVERY 12 HOURS SCHEDULED
Status: DISCONTINUED | OUTPATIENT
Start: 2017-09-21 | End: 2017-09-24 | Stop reason: HOSPADM

## 2017-09-21 RX ORDER — PHENOL 1.4 %
1 AEROSOL, SPRAY (ML) MUCOUS MEMBRANE DAILY
COMMUNITY

## 2017-09-21 RX ORDER — LOSARTAN POTASSIUM 50 MG/1
100 TABLET ORAL
Status: DISCONTINUED | OUTPATIENT
Start: 2017-09-21 | End: 2017-09-22

## 2017-09-21 RX ORDER — SODIUM CHLORIDE 0.9 % (FLUSH) 0.9 %
10 SYRINGE (ML) INJECTION AS NEEDED
Status: DISCONTINUED | OUTPATIENT
Start: 2017-09-21 | End: 2017-09-24 | Stop reason: HOSPADM

## 2017-09-21 RX ORDER — LEVOTHYROXINE SODIUM 112 UG/1
112 TABLET ORAL
Status: DISCONTINUED | OUTPATIENT
Start: 2017-09-22 | End: 2017-09-24 | Stop reason: HOSPADM

## 2017-09-21 RX ORDER — LABETALOL HYDROCHLORIDE 5 MG/ML
10 INJECTION, SOLUTION INTRAVENOUS EVERY 4 HOURS PRN
Status: DISCONTINUED | OUTPATIENT
Start: 2017-09-21 | End: 2017-09-24 | Stop reason: HOSPADM

## 2017-09-21 RX ORDER — SODIUM CHLORIDE 0.9 % (FLUSH) 0.9 %
1-10 SYRINGE (ML) INJECTION AS NEEDED
Status: DISCONTINUED | OUTPATIENT
Start: 2017-09-21 | End: 2017-09-24 | Stop reason: HOSPADM

## 2017-09-21 RX ORDER — BUMETANIDE 0.25 MG/ML
1 INJECTION INTRAMUSCULAR; INTRAVENOUS ONCE
Status: DISCONTINUED | OUTPATIENT
Start: 2017-09-21 | End: 2017-09-21

## 2017-09-21 RX ORDER — POTASSIUM CHLORIDE 7.45 MG/ML
10 INJECTION INTRAVENOUS
Status: DISCONTINUED | OUTPATIENT
Start: 2017-09-21 | End: 2017-09-24 | Stop reason: HOSPADM

## 2017-09-21 RX ORDER — ACETAMINOPHEN 325 MG/1
650 TABLET ORAL EVERY 4 HOURS PRN
Status: DISCONTINUED | OUTPATIENT
Start: 2017-09-21 | End: 2017-09-24 | Stop reason: HOSPADM

## 2017-09-21 RX ORDER — CARVEDILOL 6.25 MG/1
6.25 TABLET ORAL 2 TIMES DAILY WITH MEALS
Status: DISCONTINUED | OUTPATIENT
Start: 2017-09-21 | End: 2017-09-21

## 2017-09-21 RX ORDER — HYDRALAZINE HYDROCHLORIDE 20 MG/ML
10 INJECTION INTRAMUSCULAR; INTRAVENOUS EVERY 6 HOURS PRN
Status: DISCONTINUED | OUTPATIENT
Start: 2017-09-21 | End: 2017-09-24 | Stop reason: HOSPADM

## 2017-09-21 RX ORDER — POTASSIUM CHLORIDE 1.5 G/1.77G
40 POWDER, FOR SOLUTION ORAL AS NEEDED
Status: DISCONTINUED | OUTPATIENT
Start: 2017-09-21 | End: 2017-09-24 | Stop reason: HOSPADM

## 2017-09-21 RX ORDER — ONDANSETRON 4 MG/1
4 TABLET, FILM COATED ORAL EVERY 6 HOURS PRN
Status: DISCONTINUED | OUTPATIENT
Start: 2017-09-21 | End: 2017-09-24 | Stop reason: HOSPADM

## 2017-09-21 RX ADMIN — ESTRADIOL 1 MG: 0.5 TABLET ORAL at 14:12

## 2017-09-21 RX ADMIN — LOSARTAN POTASSIUM 100 MG: 50 TABLET ORAL at 14:12

## 2017-09-21 RX ADMIN — Medication 10 ML: at 11:48

## 2017-09-21 RX ADMIN — HEPARIN SODIUM 5000 UNITS: 5000 INJECTION, SOLUTION INTRAVENOUS; SUBCUTANEOUS at 14:11

## 2017-09-21 RX ADMIN — HYDRALAZINE HYDROCHLORIDE 25 MG: 25 TABLET ORAL at 21:37

## 2017-09-21 RX ADMIN — ONDANSETRON 4 MG: 2 INJECTION INTRAMUSCULAR; INTRAVENOUS at 04:06

## 2017-09-21 RX ADMIN — HYDRALAZINE HYDROCHLORIDE 25 MG: 25 TABLET ORAL at 15:33

## 2017-09-21 RX ADMIN — HEPARIN SODIUM 5000 UNITS: 5000 INJECTION, SOLUTION INTRAVENOUS; SUBCUTANEOUS at 21:38

## 2017-09-21 RX ADMIN — FUROSEMIDE 40 MG: 10 INJECTION, SOLUTION INTRAMUSCULAR; INTRAVENOUS at 11:48

## 2017-09-21 RX ADMIN — FUROSEMIDE 40 MG: 10 INJECTION, SOLUTION INTRAMUSCULAR; INTRAVENOUS at 21:36

## 2017-09-21 RX ADMIN — NITROGLYCERIN 1 INCH: 20 OINTMENT TOPICAL at 04:06

## 2017-09-21 RX ADMIN — CARVEDILOL 6.25 MG: 6.25 TABLET, FILM COATED ORAL at 14:12

## 2017-09-21 NOTE — CONSULTS
Inpatient Consult to Cardiology  Consult performed by: ANTIONE KUNZ  Consult ordered by: STEPHANIA BLACK Cardiology at [facility]  Cardiovascular Consultation Note           Patient is a 82 y/o female patient of Dr Bola Michaels with a history of non ischemic cardiomyopathy who has been admitted to [facility] multiple times in the last couple of months with exacerbation of heart failure.  She was actually just discharged tow weeks.  She has again presented back to [facility] earlier today with complaints of increased dyspnea and has been ruled in for acute exacerbation of heart failure with elevated BNP of >1,000 and chest xray indicating acute decompensated heart failure. The patient tells me she has difficulty lying flat and require 2 pillows to be able to breath.  She took an extra dose of Lasix which did not seem to resolve her symptoms so she contacted 911. She reports compliance with her medications.  She had a recent heart cath in July that showed normal coronary arteries.         Past medical and surgical history, social and family history reviewed in EMR.    REVIEW OF SYSTEMS:   H&P ROS reviewed and pertinent CV ROS as noted in HPI.         Vital Sign Min/Max for last 24 hours  Temp  Min: 96.9 °F (36.1 °C)  Max: 96.9 °F (36.1 °C)   BP  Min: 135/69  Max: 208/108   Pulse  Min: 66  Max: 98   Resp  Min: 18  Max: 30   SpO2  Min: 88 %  Max: 98 %   Flow (L/min)  Min: 2  Max: 2      Intake/Output Summary (Last 24 hours) at 09/21/17 1452  Last data filed at 09/21/17 1418   Gross per 24 hour   Intake                0 ml   Output             1200 ml   Net            -1200 ml             EKG: NSR with LBBB    Lab Review:   Labs reviewed in the electronic medical record.  Pertinent findings include:  Lab Results   Component Value Date    GLUCOSE 269 (H) 09/21/2017    BUN 18 09/21/2017    CREATININE 1.30 09/21/2017    EGFRIFNONA 39 (L) 09/21/2017    BCR  13.8 09/21/2017    K 4.1 09/21/2017    CO2 23.0 09/21/2017    CALCIUM 8.7 09/21/2017    ALBUMIN 3.90 09/21/2017    LABIL2 1.3 (L) 09/21/2017    AST 30 09/21/2017    ALT 22 09/21/2017     Lab Results   Component Value Date    WBC 15.35 (H) 09/21/2017    HGB 13.1 09/21/2017    HCT 39.2 09/21/2017    .9 (H) 09/21/2017     09/21/2017               Hospital Problem List     * (Principal)Acute systolic heart failure    Overview Addendum 9/21/2017  2:51 PM by LOREN Mullen     · NYHA Class III-IV         Orthopnea    Bundle branch block, left (Chronic)    Overview Addendum 9/6/2017 11:20 AM by LOREN Mullen     · Known, not new.  Cardiomyopathy likely due to chronic LBBB with dyssynchronization of right and left ventricles.           Non-ischemic cardiomyopathy    Overview Addendum 9/6/2017 11:19 AM by LOREN Mullen     · Echo (09/06/2017): LVEF 35%.  · Echo (07/28/2017): LVEF 21 - 30%. Hypokinesis of the septal and anterior segments. Grade II diastolic dysfunction. Mild AS. Moderate MR. Mild NJ and TR. RVSP= 45 mmHg  · Cardiac cath (07/28/2017): Normal coronary arteries. LVEF 45-50%         Acute hypoxemic respiratory failure    CKD (chronic kidney disease) stage 3, GFR 30-59 ml/min (Chronic)    Essential hypertension (Chronic)                    · Increase Coreg 12.5 mg BID  · Start hydralazine 25 mg q 8 hrs  · Change po Lasix to IV 40 mg BID  · Recheck renal function in am  · Continue to trend troponins  · Limited echo to ensure no further reduction of EF    I agrree with the above plan. I believe patient has LBBB myopathy.  Patient intolerant to ACE so cannot use Entresto. Will add hydralazine. May need thoracentesis.     Yamileth PIMENTEL    I, Quan Lenz MD, personally performed the services described in this documentation as scribed by the above named individual in my presence, and it is both accurate and complete.  09/21/17 3:01 PM

## 2017-09-21 NOTE — H&P
[facility] Medicine Services  HISTORY AND PHYSICAL    Patient Name: Sylvia Jalloh  : 1934  MRN: [id number]  Primary Care Physician: Eddie Flanagan MD  Cardiologist: Dr. Vega    Subjective   Subjective     Chief Complaint:  Shortness of breath     HPI:  Sylvia Jalloh is a 83 y.o. 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  She awoke at 2-3 AM and developed acute dyspnea with mild chest tightness while walking to the bathroom. She took an additional dose of diuretic with no improvement.  Prior to going to bed, she had felt fatigued but otherwise had no acute complaints.  She called her daughter who called EMS and she was brought to the [facility] ER. Pt had cough, productive of thin to yellow sputum, with associated post-tussive vomiting. Pt was found to be in flash pulmonary edema with SBP above 200 and received nitro paste and BiPAP in the ER. She had not received bumex at time of my visit. She reports a dry weight of between 120-125lb, follows a loose fluid restriction, does not add salt but does eat canned and frozen food, and she did eat out yesterday (grilled chicken, cabbage, fried potatoes). She does not drink alcohol.  No recent medication changes in past 2 weeks. No missed doses of meds. Her BP was normal at PCP's on Friday. She does not follow her BP at home. No palpitations. No orthopnea but notes some mild PND. No LOC or syncope. She does note some chronic diarrhea that is intermittent with constipation but nothing acute. She      Review of Systems   Otherwise complete  ROS is negative except as mentioned in the HPI.    Personal History     Past Medical History:   Diagnosis Date   • Chronic kidney disease    • Disease of thyroid gland    • Gallstone pancreatitis    •  Hypertension    • Systolic heart failure secondary to idiopathic cardiomyopathy        Past Surgical History:   Procedure Laterality Date   • APPENDECTOMY     • BLADDER SUSPENSION     • CARDIAC CATHETERIZATION N/A 7/29/2017    Procedure: Coronary angiography;  Surgeon: Thiago Vega MD;  Location: [facility] INVASIVE LOCATION;  Service:    • CHOLECYSTECTOMY     • HYSTERECTOMY         Family History: family history includes Cancer in her daughter; Early death in her sister; Heart disease in her father, mother, and sister; No Known Problems in her brother.     Social History:  reports that she has never smoked. She has never used smokeless tobacco. She reports that she does not drink alcohol or use illicit drugs.    Medications:    Current Outpatient Prescriptions:   •  carvedilol (COREG) 6.25 MG tablet, Take 1 tablet by mouth 2 (Two) Times a Day With Meals., Disp: 60 tablet, Rfl: 11  •  estradiol (ESTRACE) 1 MG tablet, Take 1 mg by mouth Daily., Disp: , Rfl:   •  furosemide (LASIX) 40 MG tablet, Take 1 tablet by mouth Daily., Disp: 30 tablet, Rfl: 11  •  levothyroxine (SYNTHROID, LEVOTHROID) 112 MCG tablet, Take 112 mcg by mouth Daily., Disp: , Rfl:   •  losartan (COZAAR) 100 MG tablet, Take 1 tablet by mouth Daily., Disp: 30 tablet, Rfl: 11  •  Multiple Vitamins-Minerals (MULTIVITAMIN ADULTS 50+) tablet, Take 1 tablet by mouth Daily., Disp: , Rfl:   •  potassium chloride (K-DUR,KLOR-CON) 10 MEQ CR tablet, Take 2 tablets by mouth Daily., Disp: 60 tablet, Rfl: 3  •  Vitamin D, Cholecalciferol, 1000 UNITS capsule, Take 1,000 Units by mouth Daily., Disp: , Rfl:       Allergies   Allergen Reactions   • Ace Inhibitors Rash   • Latex Rash       Objective   Objective     Vital Signs: Temp:  [96.9 °F (36.1 °C)] 96.9 °F (36.1 °C)  Heart Rate:  [72-98] 72  Resp:  [20-30] 20  BP: (141-208)/() 151/79   Pt sat 98% on BiPAP when I entered room, was able to sat 87-88% on RA during my evaluation  Physical Exam    Constitutional: No acute distress, awake, alert  Eyes: PERRLA, sclerae anicteric, no conjunctival injection  HENT: NCAT, mucous membranes moist, JVP mildly elevated at ~9-10cm H20  Neck: Supple, no thyromegaly, no lymphadenopathy, trachea midline  Respiratory: Diffuse crackles to mid and lower lung fields, mild increased work of breathing  Cardiovascular: RRR, no murmurs, rubs, or gallops, palpable pedal pulses bilaterally  Gastrointestinal: Positive bowel sounds, soft, nontender, nondistended  Musculoskeletal: No bilateral ankle edema, no clubbing or cyanosis to bilateral lower extremities  Psychiatric: Oriented x 3, appropriate affect, cooperative  Neurologic: Strength symmetric in all extremities, Cranial Nerves grossly intact to confrontation, speech clear  Skin: No rashes to limited exam of trunk and extremities     Results Reviewed:  I have personally reviewed current lab, radiology, and data and agree.      Results from last 7 days  Lab Units 09/21/17  0358   WBC 10*3/mm3 15.35*   HEMOGLOBIN g/dL 13.1   HEMATOCRIT % 39.2   PLATELETS 10*3/mm3 295       Results from last 7 days  Lab Units 09/21/17  0358   SODIUM mmol/L 138   POTASSIUM mmol/L 4.1   CHLORIDE mmol/L 104   CO2 mmol/L 23.0   BUN mg/dL 18   CREATININE mg/dL 1.30   GLUCOSE mg/dL 269*   CALCIUM mg/dL 8.7   ALT (SGPT) U/L 22   AST (SGOT) U/L 30     BNP   Date Value Ref Range Status   09/21/2017 1049.0 (H) 0.0 - 100.0 pg/mL Final     No results found for: PHART  Imaging Results (last 24 hours)     Procedure Component Value Units Date/Time    XR Chest 1 View [[id number]]  (Abnormal) Collected:  09/21/17 0356     Updated:  09/21/17 0634    Narrative:       EXAM:    XR Chest, 1 View    EXAM DATE/TIME:    9/21/2017 3:56 AM    CLINICAL HISTORY:    83 years old, female; Signs and symptoms; Shortness of breath; Patient HX:   Chf/copd protocol    TECHNIQUE:    Single upright AP portable chest at 5:20 AM    COMPARISON:    CR - XR CHEST PA AND LATERAL 9/8/2017  11:41:24 AM    FINDINGS:    Diffuse density throughout the LEFT lung base, likely LEFT pleural effusion   with underlying atelectasis and/or infiltrate, increased.  Mild parenchymal density RIGHT lung base medially, new.  Likely small RIGHT pleural effusion.  Congestion of the central pulmonary vessels with interstitial and air space   densities perihilar regions bilaterally.  Mild asymmetric interstitial densities RIGHT upper lobe.  No pneumothorax.  Heart appears normal in size.  Mediastinum unremarkable.  Aortic calcifications.  Curvature thoracic spine convex to the RIGHT and thoracolumbar spine convex to   the LEFT.  Degenerative changes of the spine.  No acute bone abnormality.      Impression:         Interim development of likely congestive heart failure with mild pulmonary   edema.    Increasing LEFT pleural effusion with underlying atelectasis and/or infiltrate.    New small RIGHT pleural effusion with possible atelectasis or infiltrate RIGHT   lung base.    Additional nonacute findings as noted.    THIS DOCUMENT HAS BEEN ELECTRONICALLY SIGNED BY CARYN LOBO MD        Personally reviewed CXR, agree with official interp of bilateral pulmonary edema, left pleff, mild right pleff     ECG reviewed and compared to prior, chronic LBBB on my review    Echo from 9/6:  Interpretation Summary      · Left ventricular systolic function is moderately decreased. Estimated EF = 30%.  · Mild mitral valve regurgitation is present         Assessment/Plan   Assessment / Plan   Sylvia Jalloh is a 83 y.o. 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  Cardiomyopathy likely due to chronic LBBB with dyssynchronization of right and left ventricles.           CKD (chronic kidney disease) stage 3, GFR 30-59 ml/min (Chronic)    Non-ischemic cardiomyopathy    Overview Addendum 9/6/2017 11:19 AM by LOREN Mullen     · Echo (09/06/2017): LVEF 35%.  · Echo (07/28/2017): LVEF 21 - 30%. Hypokinesis of the septal and anterior segments. Grade II diastolic dysfunction. Mild AS. Moderate MR. Mild MA and TR. RVSP= 45 mmHg  · Cardiac cath (07/28/2017): Normal coronary arteries. LVEF 45-50%               Plan:  Acute Hypoxemic Respiratory Failure due to V/Q mismatch from pulmonary edema   Acute on Chronic LV Systolic HF with LVEF 30% due to NICM/Possible Takotsubo cardiomyopathy, ACC C  Flash Pulmonary Edema   - Pt improved with nitropaste and BiPAP in ER  - Start IV lasix and continue with strict I/O, daily weights  - Continue carvedilol and losartan  - ? Candidate for entresto  - Continue supplemental O2   - Limited echo to ensure EF stable  - Trend serial troponins, [facility] 7/29 with normal coronary arteries  - Consult Dr. Vega given prior relationship and at family's request    Hypertensive Emergency on Chronic Essential hypertension   - Continue coreg, losartan  - Add additional agent depending on progress     CKD III (baseline 1.1 - 1.4)  - Monitor renal function closely while inpatient     D with hx of mild MS, moderate MR, mild MA/TR  Chronic Left Bundle Branch Block   Hypothyroidism      DVT prophylaxis: SQ heparin  CODE STATUS: Full Code     Admission Status:  I believe this patient meets INPATIENT status due to the need for care of acute hypoxemic respiratory failure with acute LV  systolic HF which can only be reasonably provided in an hospital setting such as aggressive/expedited ancillary services and/or consultation services, the necessity for IV medications, close physician monitoring and/or the possible need for procedures.  In such, I feel patient’s risk for adverse outcomes and need for care warrant INPATIENT evaluation and predict the patient’s care encounter to likely last beyond 2 midnights.    Donny Xavier MD   09/21/17   6:52 AM

## 2017-09-21 NOTE — ED PROVIDER NOTES
Subjective   HPI Comments: Ms. Sylvia Jalloh is a 83 y.o. female who presents to the ED with c/o SoA. Daughter notes that she was called at 0300 this morning after the pt had complained of SoA. She complains of a rattling sound and has had a similar episode 3 weeks ago. She was feeling good yesterday, and her PCP recommended to take a dose of her water pill if she gets this SoA so she did that prior to calling EMS. EMS notes that they gave her a neb treatment PTA and is on a nonrebreathing mask. She has a hx of CHF. History is limited secondary to pt respiratory distress.     Patient is a 83 y.o. female presenting with shortness of breath.   History provided by:  Relative and EMS personnel  History limited by:  Severe respiratory distress and acuity of condition  Shortness of Breath   Severity:  Severe  Onset quality:  Sudden  Duration: Onset 0300.  Timing:  Constant  Progression:  Unable to specify  Chronicity:  New  Relieved by:  Nothing  Ineffective treatments:  Diuretics (Nebulizer treatment)  Associated symptoms: cough    Risk factors comment:  CHF      Review of Systems   Unable to perform ROS: Severe respiratory distress   Respiratory: Positive for cough and shortness of breath.        Past Medical History:   Diagnosis Date   • Chronic kidney disease    • Disease of thyroid gland    • Gallstone pancreatitis 2015   • Hypertension        Allergies   Allergen Reactions   • Ace Inhibitors Rash   • Latex Rash       Past Surgical History:   Procedure Laterality Date   • APPENDECTOMY     • BLADDER SUSPENSION     • CARDIAC CATHETERIZATION N/A 7/29/2017    Procedure: Coronary angiography;  Surgeon: Thiago Vega MD;  Location: [facility] INVASIVE LOCATION;  Service:    • CHOLECYSTECTOMY     • HYSTERECTOMY         Family History   Problem Relation Age of Onset   • Heart disease Mother    • Heart disease Father    • Early death Sister    • Heart disease Sister    • No Known Problems Brother    • Cancer  Daughter        Social History     Social History   • Marital status:      Spouse name: N/A   • Number of children: N/A   • Years of education: N/A     Occupational History   • Retired      Social History Main Topics   • Smoking status: Never Smoker   • Smokeless tobacco: Never Used   • Alcohol use No   • Drug use: No   • Sexual activity: Defer     Other Topics Concern   • None     Social History Narrative    Daughter just  from cancer 2017, lots of stress.  She lives alone now, but has two sisters that check on her often and another daughter that lives a mile away.        Caffeine :0-1 servings per day.             Objective   Physical Exam   Constitutional: She appears well-developed and well-nourished. She appears distressed (Respiratory).   HENT:   Head: Normocephalic and atraumatic.   Nose: Nose normal.   Eyes: Conjunctivae are normal. No scleral icterus.   Neck: Normal range of motion. Neck supple.   Cardiovascular: Normal rate, regular rhythm, normal heart sounds and intact distal pulses.    No murmur heard.  Pulmonary/Chest: She is in respiratory distress. She has rhonchi. She has rales.   Diffuse coarse breath sounds with ronchi and rales   Abdominal: Soft. There is no tenderness.   Musculoskeletal: Normal range of motion.   Neurological: She is alert.   Skin: Skin is warm and dry. She is not diaphoretic.   Nursing note and vitals reviewed.      Critical Care  Performed by: ARSALAN FOWLER  Authorized by: ARASLAN FOWLER   Total critical care time: 60 minutes  Critical care time was exclusive of separately billable procedures and treating other patients.  Critical care was necessary to treat or prevent imminent or life-threatening deterioration of the following conditions: respiratory failure.  Critical care was time spent personally by me on the following activities: development of treatment plan with patient or surrogate, discussions with consultants, evaluation of patient's  response to treatment, ordering and performing treatments and interventions, pulse oximetry, examination of patient, ordering and review of laboratory studies, re-evaluation of patient's condition, obtaining history from patient or surrogate, ordering and review of radiographic studies and review of old charts.               ED Course  ED Course   Value Comment By Time   SpO2: (!) 88 % (Reviewed) Glenn Doran MD 09/21 0356   Resp: (!) 30 (Reviewed) Glenn Doran MD 09/21 0356   BNP: (!) 1049.0 (Reviewed) Glenn Doran MD 09/21 0513    The patient has findings consistent with combination of decompensated congestive heart failure as well as flash pulmonary edema.  Patient has appearing much better and resting much more comfortable.  Vital signs are significantly improved.  We'll admit the patient hospitalist for further treatment and evaluation.  Case discussed with Dr. nevarez who agrees with the plan for admission.  I also discussed findings and plan with the family and patient agree. Glenn Doran MD 09/21 0649                     MDM  Number of Diagnoses or Management Options  Diagnosis management comments: ECG/EMG Results (last 24 hours)     Procedure Component Value Units Date/Time    ECG 12 Lead ([id number]) Collected:  09/21/17 0357     Updated:  09/21/17 0403    Narrative:       Test Reason : SOA  Blood Pressure : **/** mmHG  Vent. Rate : 093 BPM     Atrial Rate : 093 BPM     P-R Int : 168 ms          QRS Dur : 162 ms      QT Int : 402 ms       P-R-T Axes : 068 -22 122 degrees     QTc Int : 499 ms    Normal sinus rhythm  Left bundle branch block  Abnormal ECG  When compared with ECG of 06-SEP-2017 05:01, (Unconfirmed)  No significant change was found  Confirmed by GLENN DORAN MD ([id number]) on 9/21/2017 4:03:20 AM    Referred By:  JANETH           Confirmed By:GLENN DORAN MD             Amount and/or Complexity of Data Reviewed  Clinical lab tests: reviewed  Tests in the radiology  section of CPT®: reviewed  Decide to obtain previous medical records or to obtain history from someone other than the patient: yes  Obtain history from someone other than the patient: yes  Review and summarize past medical records: yes  Discuss the patient with other providers: yes  Independent visualization of images, tracings, or specimens: yes    Critical Care  Total time providing critical care: 30-74 minutes      Final diagnoses:   Acute hypoxemic respiratory failure   Acute decompensated heart failure   Flash pulmonary edema   Non-ischemic cardiomyopathy   Essential hypertension       Documentation assistance provided by lorene PENA.  Information recorded by the scribe was done at my direction and has been verified and validated by me.     Jimbo Pena  09/21/17 0357       Glenn Doran MD  09/21/17 0859

## 2017-09-22 ENCOUNTER — APPOINTMENT (OUTPATIENT)
Dept: CARDIOLOGY | Facility: HOSPITAL | Age: 82
End: 2017-09-22

## 2017-09-22 ENCOUNTER — APPOINTMENT (OUTPATIENT)
Dept: GENERAL RADIOLOGY | Facility: HOSPITAL | Age: 82
End: 2017-09-22
Attending: HOSPITALIST

## 2017-09-22 LAB
ANION GAP SERPL CALCULATED.3IONS-SCNC: 4 MMOL/L (ref 3–11)
ARTICHOKE IGE QN: 65 MG/DL (ref 0–130)
BH CV ECHO MEAS - AO MAX PG: 12.3 MMHG
BH CV ECHO MEAS - AO MEAN PG: 8 MMHG
BH CV ECHO MEAS - AO ROOT AREA (BSA CORRECTED): 1.5
BH CV ECHO MEAS - AO ROOT AREA: 4.2 CM^2
BH CV ECHO MEAS - AO ROOT DIAM: 2.3 CM
BH CV ECHO MEAS - AO V2 MAX: 175 CM/SEC
BH CV ECHO MEAS - AO V2 MEAN: 135 CM/SEC
BH CV ECHO MEAS - AO V2 VTI: 41.6 CM
BH CV ECHO MEAS - BSA(HAYCOCK): 1.6 M^2
BH CV ECHO MEAS - BSA: 1.5 M^2
BH CV ECHO MEAS - BZI_BMI: 23.4 KILOGRAMS/M^2
BH CV ECHO MEAS - BZI_METRIC_HEIGHT: 154.9 CM
BH CV ECHO MEAS - BZI_METRIC_WEIGHT: 56.2 KG
BH CV ECHO MEAS - CONTRAST EF (2CH): 48 ML/M^2
BH CV ECHO MEAS - CONTRAST EF 4CH: 41.6 ML/M^2
BH CV ECHO MEAS - EDV(CUBED): 95.4 ML
BH CV ECHO MEAS - EDV(MOD-SP2): 123 ML
BH CV ECHO MEAS - EDV(MOD-SP4): 125 ML
BH CV ECHO MEAS - EDV(TEICH): 95.9 ML
BH CV ECHO MEAS - EF(CUBED): 53.1 %
BH CV ECHO MEAS - EF(TEICH): 45.1 %
BH CV ECHO MEAS - ESV(CUBED): 44.7 ML
BH CV ECHO MEAS - ESV(MOD-SP2): 64 ML
BH CV ECHO MEAS - ESV(MOD-SP4): 73 ML
BH CV ECHO MEAS - ESV(TEICH): 52.6 ML
BH CV ECHO MEAS - FS: 22.3 %
BH CV ECHO MEAS - IVS/LVPW: 1
BH CV ECHO MEAS - IVSD: 1.2 CM
BH CV ECHO MEAS - LA DIMENSION: 4.4 CM
BH CV ECHO MEAS - LA/AO: 1.9
BH CV ECHO MEAS - LV DIASTOLIC VOL/BSA (35-75): 81.1 ML/M^2
BH CV ECHO MEAS - LV MASS(C)D: 211.5 GRAMS
BH CV ECHO MEAS - LV MASS(C)DI: 137.2 GRAMS/M^2
BH CV ECHO MEAS - LV SYSTOLIC VOL/BSA (12-30): 47.3 ML/M^2
BH CV ECHO MEAS - LVIDD: 4.6 CM
BH CV ECHO MEAS - LVIDS: 3.6 CM
BH CV ECHO MEAS - LVLD AP2: 7.9 CM
BH CV ECHO MEAS - LVLD AP4: 7.8 CM
BH CV ECHO MEAS - LVLS AP2: 6.7 CM
BH CV ECHO MEAS - LVLS AP4: 6.9 CM
BH CV ECHO MEAS - LVOT AREA (M): 3.1 CM^2
BH CV ECHO MEAS - LVOT AREA: 3.1 CM^2
BH CV ECHO MEAS - LVOT DIAM: 2 CM
BH CV ECHO MEAS - LVPWD: 1.2 CM
BH CV ECHO MEAS - MV A MAX VEL: 104 CM/SEC
BH CV ECHO MEAS - MV DEC SLOPE: 305 CM/SEC^2
BH CV ECHO MEAS - MV DEC TIME: 0.27 SEC
BH CV ECHO MEAS - MV E MAX VEL: 92.3 CM/SEC
BH CV ECHO MEAS - MV E/A: 0.89
BH CV ECHO MEAS - MV MAX PG: 5.8 MMHG
BH CV ECHO MEAS - MV MEAN PG: 3 MMHG
BH CV ECHO MEAS - MV P1/2T MAX VEL: 107 CM/SEC
BH CV ECHO MEAS - MV P1/2T: 102.8 MSEC
BH CV ECHO MEAS - MV V2 MAX: 120 CM/SEC
BH CV ECHO MEAS - MV V2 MEAN: 81.3 CM/SEC
BH CV ECHO MEAS - MV V2 VTI: 44 CM
BH CV ECHO MEAS - MVA P1/2T LCG: 2.1 CM^2
BH CV ECHO MEAS - MVA(P1/2T): 2.1 CM^2
BH CV ECHO MEAS - PA ACC SLOPE: 930 CM/SEC^2
BH CV ECHO MEAS - PA ACC TIME: 0.11 SEC
BH CV ECHO MEAS - PA PR(ACCEL): 30.4 MMHG
BH CV ECHO MEAS - PI END-D VEL: 106 CM/SEC
BH CV ECHO MEAS - RAP SYSTOLE: 3 MMHG
BH CV ECHO MEAS - RVSP: 20.6 MMHG
BH CV ECHO MEAS - SI(AO): 112.1 ML/M^2
BH CV ECHO MEAS - SI(CUBED): 32.9 ML/M^2
BH CV ECHO MEAS - SI(MOD-SP2): 38.3 ML/M^2
BH CV ECHO MEAS - SI(MOD-SP4): 33.7 ML/M^2
BH CV ECHO MEAS - SI(TEICH): 28 ML/M^2
BH CV ECHO MEAS - SV(AO): 172.8 ML
BH CV ECHO MEAS - SV(CUBED): 50.7 ML
BH CV ECHO MEAS - SV(MOD-SP2): 59 ML
BH CV ECHO MEAS - SV(MOD-SP4): 52 ML
BH CV ECHO MEAS - SV(TEICH): 43.2 ML
BH CV ECHO MEAS - TAPSE (>1.6): 1.9 CM2
BH CV ECHO MEAS - TR MAX VEL: 210 CM/SEC
BH CV XLRA - RV BASE: 3.8 CM
BH CV XLRA - RV LENGTH: 6.3 CM
BH CV XLRA - RV MID: 3 CM
BH CV XLRA - TDI S': 10.1 CM/SEC
BNP SERPL-MCNC: 847 PG/ML (ref 0–100)
BUN BLD-MCNC: 17 MG/DL (ref 9–23)
BUN/CREAT SERPL: 13.1 (ref 7–25)
CALCIUM SPEC-SCNC: 8.1 MG/DL (ref 8.7–10.4)
CHLORIDE SERPL-SCNC: 102 MMOL/L (ref 99–109)
CHOLEST SERPL-MCNC: 144 MG/DL (ref 0–200)
CO2 SERPL-SCNC: 30 MMOL/L (ref 20–31)
CREAT BLD-MCNC: 1.3 MG/DL (ref 0.6–1.3)
DEPRECATED RDW RBC AUTO: 49.8 FL (ref 37–54)
ERYTHROCYTE [DISTWIDTH] IN BLOOD BY AUTOMATED COUNT: 13.7 % (ref 11.3–14.5)
GFR SERPL CREATININE-BSD FRML MDRD: 39 ML/MIN/1.73
GLUCOSE BLD-MCNC: 103 MG/DL (ref 70–100)
HCT VFR BLD AUTO: 31.6 % (ref 34.5–44)
HDLC SERPL-MCNC: 63 MG/DL (ref 40–60)
HGB BLD-MCNC: 10.7 G/DL (ref 11.5–15.5)
LEFT ATRIUM VOLUME INDEX: 37 ML/M2
LEFT ATRIUM VOLUME: 57 CM3
LV EF 2D ECHO EST: 30 %
MAGNESIUM SERPL-MCNC: 1.8 MG/DL (ref 1.3–2.7)
MCH RBC QN AUTO: 33.8 PG (ref 27–31)
MCHC RBC AUTO-ENTMCNC: 33.9 G/DL (ref 32–36)
MCV RBC AUTO: 99.7 FL (ref 80–99)
PLATELET # BLD AUTO: 176 10*3/MM3 (ref 150–450)
PMV BLD AUTO: 9.7 FL (ref 6–12)
POTASSIUM BLD-SCNC: 3.5 MMOL/L (ref 3.5–5.5)
POTASSIUM BLD-SCNC: 4.4 MMOL/L (ref 3.5–5.5)
RBC # BLD AUTO: 3.17 10*6/MM3 (ref 3.89–5.14)
SODIUM BLD-SCNC: 136 MMOL/L (ref 132–146)
TRIGL SERPL-MCNC: 88 MG/DL (ref 0–150)
WBC NRBC COR # BLD: 12.25 10*3/MM3 (ref 3.5–10.8)

## 2017-09-22 PROCEDURE — 84132 ASSAY OF SERUM POTASSIUM: CPT | Performed by: HOSPITALIST

## 2017-09-22 PROCEDURE — 25010000002 FUROSEMIDE PER 20 MG: Performed by: NURSE PRACTITIONER

## 2017-09-22 PROCEDURE — 93308 TTE F-UP OR LMTD: CPT

## 2017-09-22 PROCEDURE — 83735 ASSAY OF MAGNESIUM: CPT | Performed by: HOSPITALIST

## 2017-09-22 PROCEDURE — 83880 ASSAY OF NATRIURETIC PEPTIDE: CPT | Performed by: HOSPITALIST

## 2017-09-22 PROCEDURE — 80048 BASIC METABOLIC PNL TOTAL CA: CPT | Performed by: NURSE PRACTITIONER

## 2017-09-22 PROCEDURE — 25010000002 SULFUR HEXAFLUORIDE MICROSPH 60.7-25 MG RECONSTITUTED SUSPENSION: Performed by: HOSPITALIST

## 2017-09-22 PROCEDURE — 93325 DOPPLER ECHO COLOR FLOW MAPG: CPT

## 2017-09-22 PROCEDURE — 85027 COMPLETE CBC AUTOMATED: CPT | Performed by: HOSPITALIST

## 2017-09-22 PROCEDURE — 80061 LIPID PANEL: CPT | Performed by: HOSPITALIST

## 2017-09-22 PROCEDURE — 99232 SBSQ HOSP IP/OBS MODERATE 35: CPT | Performed by: INTERNAL MEDICINE

## 2017-09-22 PROCEDURE — 25010000002 HEPARIN (PORCINE) PER 1000 UNITS: Performed by: HOSPITALIST

## 2017-09-22 PROCEDURE — 93321 DOPPLER ECHO F-UP/LMTD STD: CPT

## 2017-09-22 PROCEDURE — 99233 SBSQ HOSP IP/OBS HIGH 50: CPT | Performed by: HOSPITALIST

## 2017-09-22 PROCEDURE — 71020 HC CHEST PA AND LATERAL: CPT

## 2017-09-22 PROCEDURE — 93306 TTE W/DOPPLER COMPLETE: CPT | Performed by: INTERNAL MEDICINE

## 2017-09-22 RX ADMIN — HYDRALAZINE HYDROCHLORIDE 25 MG: 25 TABLET ORAL at 06:26

## 2017-09-22 RX ADMIN — LOSARTAN POTASSIUM 100 MG: 50 TABLET ORAL at 07:45

## 2017-09-22 RX ADMIN — CARVEDILOL 12.5 MG: 12.5 TABLET, FILM COATED ORAL at 18:24

## 2017-09-22 RX ADMIN — SULFUR HEXAFLUORIDE 3 ML: KIT at 10:40

## 2017-09-22 RX ADMIN — HEPARIN SODIUM 5000 UNITS: 5000 INJECTION, SOLUTION INTRAVENOUS; SUBCUTANEOUS at 20:46

## 2017-09-22 RX ADMIN — HEPARIN SODIUM 5000 UNITS: 5000 INJECTION, SOLUTION INTRAVENOUS; SUBCUTANEOUS at 07:45

## 2017-09-22 RX ADMIN — ACETAMINOPHEN 650 MG: 325 TABLET, FILM COATED ORAL at 22:47

## 2017-09-22 RX ADMIN — ACETAMINOPHEN 650 MG: 325 TABLET, FILM COATED ORAL at 00:04

## 2017-09-22 RX ADMIN — ESTRADIOL 1 MG: 0.5 TABLET ORAL at 07:44

## 2017-09-22 RX ADMIN — LEVOTHYROXINE SODIUM 112 MCG: 112 TABLET ORAL at 06:26

## 2017-09-22 RX ADMIN — FUROSEMIDE 40 MG: 10 INJECTION, SOLUTION INTRAMUSCULAR; INTRAVENOUS at 20:46

## 2017-09-22 RX ADMIN — POTASSIUM CHLORIDE 40 MEQ: 750 CAPSULE, EXTENDED RELEASE ORAL at 12:30

## 2017-09-22 RX ADMIN — HYDRALAZINE HYDROCHLORIDE 25 MG: 25 TABLET ORAL at 14:40

## 2017-09-22 RX ADMIN — POTASSIUM CHLORIDE 40 MEQ: 750 CAPSULE, EXTENDED RELEASE ORAL at 07:44

## 2017-09-22 RX ADMIN — FUROSEMIDE 40 MG: 10 INJECTION, SOLUTION INTRAMUSCULAR; INTRAVENOUS at 07:46

## 2017-09-22 RX ADMIN — MAGNESIUM SULFATE HEPTAHYDRATE 4 G: 40 INJECTION, SOLUTION INTRAVENOUS at 07:43

## 2017-09-22 RX ADMIN — SACUBITRIL AND VALSARTAN 1 TABLET: 24; 26 TABLET, FILM COATED ORAL at 20:46

## 2017-09-22 RX ADMIN — CARVEDILOL 12.5 MG: 12.5 TABLET, FILM COATED ORAL at 07:46

## 2017-09-22 NOTE — PROGRESS NOTES
"  [facility] at [facility]   Inpatient Progress Note       LOS: 1 day   Patient Care Team:  Eddie Flanagan MD as PCP - General (Family Medicine)    Chief Complaint:  Follow-up for cardiomyopathy    Subjective     Interval History:   Patient states that her breathing is feeling improved. No chest pain.  Feels nearly back to baseline today.      Review of Systems:   Pertinent positives noted in history, exam, and assessment. Otherwise reviewed and negative.      Objective     Vitals:  Blood pressure 150/57, pulse 65, temperature 96.3 °F (35.7 °C), temperature source Axillary, resp. rate 18, height 61\" (154.9 cm), weight 124 lb 1.6 oz (56.3 kg), SpO2 (!) 89 %.     Intake/Output Summary (Last 24 hours) at 09/22/17 1353  Last data filed at 09/22/17 1224   Gross per 24 hour   Intake                0 ml   Output             2150 ml   Net            -2150 ml     Physical Exam   Constitutional: She is oriented to person, place, and time. She appears well-developed and well-nourished. No distress.   Neck: No JVD present. No tracheal deviation present.   Cardiovascular: Normal rate, regular rhythm, normal heart sounds and intact distal pulses.  Exam reveals no friction rub.    No murmur heard.  Pulmonary/Chest: Effort normal. No respiratory distress.   Basilar rales   Abdominal: Soft. Bowel sounds are normal. There is no tenderness.   Musculoskeletal: She exhibits no edema or deformity.   Neurological: She is alert and oriented to person, place, and time.       I have examined the patient and agree with the above   Results Review:     I reviewed the patient's new clinical results.      Results from last 7 days  Lab Units 09/22/17  0511   WBC 10*3/mm3 12.25*   HEMOGLOBIN g/dL 10.7*   HEMATOCRIT % 31.6*   PLATELETS 10*3/mm3 176       Results from last 7 days  Lab Units 09/22/17  0511 09/21/17  0358   SODIUM mmol/L 136 138   POTASSIUM mmol/L 3.5 4.1   CHLORIDE mmol/L 102 104   CO2 mmol/L 30.0 23.0   BUN " mg/dL 17 18   CREATININE mg/dL 1.30 1.30   CALCIUM mg/dL 8.1* 8.7   BILIRUBIN mg/dL  --  1.3*   ALK PHOS U/L  --  88   ALT (SGPT) U/L  --  22   AST (SGOT) U/L  --  30   GLUCOSE mg/dL 103* 269*       Results from last 7 days  Lab Units 09/22/17  0511   SODIUM mmol/L 136   POTASSIUM mmol/L 3.5   CHLORIDE mmol/L 102   CO2 mmol/L 30.0   BUN mg/dL 17   CREATININE mg/dL 1.30   GLUCOSE mg/dL 103*   CALCIUM mg/dL 8.1*           Lab Results  Lab Value Date/Time   TROPONINI 0.011 07/28/2017 0337   TROPONINI 0.015 07/27/2017 2101   TROPONINI 0.009 07/27/2017 1555           Results from last 7 days  Lab Units 09/22/17  0511   CHOLESTEROL mg/dL 144   TRIGLYCERIDES mg/dL 88   HDL CHOL mg/dL 63*           ECHO pending  Tele:  SR    Assessment/Plan     Principal Problem:    Acute systolic heart failure  Active Problems:    Orthopnea    Hypertensive Emergency on chronic Essential hypertension    Bundle branch block, left    CKD (chronic kidney disease) stage 3, GFR 30-59 ml/min    Non-ischemic cardiomyopathy    Acute hypoxemic respiratory failure      1. Acute on chronic systolic congestive heart failure  2. LBBB  3. Acute hypoxemic respiratory failure  4. Chronic kidney disease  5. Hypertension     Plan:  Continue diuresis.  Will check a renal artery duplex to rule out renal artery stenosis.  Change ARB to Entresto.  Long discussion with the patient and daughter today regarding the use of when necessary diuretics based on her daily weights.  Hopefully home in 1-2 days.  Suzanne Peoples, LOREN  09/22/17  1:53 PM  IThiago have reviewed the note in full and agree with all aspects of the above including physical exam, assessment, labs and plan with changes made accordingly.     Thiago Vega MD  09/22/17  4:52 PM    \  Dictated utilizing Dragon dictation

## 2017-09-22 NOTE — PROGRESS NOTES
Discharge Planning Assessment  [facility]     Patient Name: Sylvia Jalloh  MRN: [id number]  Today's Date: 9/22/2017    Admit Date: 9/21/2017          Discharge Needs Assessment       09/22/17 1008    Living Environment    Lives With alone    Living Arrangements house    Home Accessibility no concerns    Living Environment Comment Sister can help as needed.    Discharge Needs Assessment    Community Agency Name(S) No HH involved    Equipment Currently Used at Home bath bench    Equipment Needed After Discharge none            Discharge Plan       09/22/17 1009    Case Management/Social Work Plan    Plan Home at DC    Patient/Family In Agreement With Plan yes    Additional Comments I spoke with the pt and her sister in the room. The pt states she has no DC needs at this time.        Discharge Placement     No information found        Expected Discharge Date and Time     Expected Discharge Date Expected Discharge Time    Sep 24, 2017               Demographic Summary     None            Functional Status       09/22/17 1008    IADL    Medications independent    Meal Preparation independent    Housekeeping independent    Laundry independent    Shopping independent    Oral Care independent      09/22/17 1007    Functional Status Prior    Ambulation 0-->independent    Transferring 0-->independent    Toileting 0-->independent    Bathing 0-->independent    Dressing 0-->independent    Eating 0-->independent            Psychosocial     None            Abuse/Neglect     None            Legal     None            Substance Abuse     None            Patient Forms     None          Liz Ty RN

## 2017-09-22 NOTE — PLAN OF CARE
Problem: Respiratory Insufficiency (Adult)  Goal: Identify Related Risk Factors and Signs and Symptoms  Outcome: Ongoing (interventions implemented as appropriate)  Goal: Acid/Base Balance  Outcome: Ongoing (interventions implemented as appropriate)  Goal: Effective Ventilation  Outcome: Ongoing (interventions implemented as appropriate)    Problem: Patient Care Overview (Adult)  Goal: Plan of Care Review  Outcome: Ongoing (interventions implemented as appropriate)

## 2017-09-22 NOTE — PROGRESS NOTES
[facility] Medicine Services  PROGRESS NOTE    Patient Name: Sylvia Jalloh  : 1934  MRN: [id number]    Date of Admission: 2017  Length of Stay: 1  Primary Care Physician: Eddie Flanagan MD    Subjective   Subjective     CC:  Follow up heart failure    HPI:  Patient feels better today.  She has diuresed approximately 2.5 L at time of my visit.  Blood pressure control is improved as well.  She denies chest pain, or significant dyspnea at rest.  Exercise tolerance has improved.  No orthopnea or PND overnight.  Room air oxygen saturation remains around 88% with exertion.    Review of Systems   Constitutional: Negative for chills and fever.   Respiratory: Negative for cough and shortness of breath.    Cardiovascular: Negative for chest pain and palpitations.   Gastrointestinal: Negative for abdominal pain, nausea and vomiting.     Objective   Objective     Vital Signs: Temp:  [96.3 °F (35.7 °C)-98.4 °F (36.9 °C)] 96.3 °F (35.7 °C)  Heart Rate:  [62-78] 65  Resp:  [16-20] 16  BP: (140-167)/(57-85) 150/57     Physical Exam:  Constitutional: No acute distress, awake, alert  HENT: NCAT, mucous membranes moist  Respiratory: Bibasilar crackles, much improved from admission, respiratory effort normal   Cardiovascular: RRR, no murmurs, rubs, or gallops, palpable pedal pulses bilaterally  Gastrointestinal: Positive bowel sounds, soft, nontender, nondistended  Musculoskeletal: No bilateral ankle edema  Psychiatric: Oriented x 3, appropriate affect, cooperative  Neurologic: Strength symmetric in all extremities, CN grossly intact to confrontation, normal gait    Results Reviewed:  I have personally reviewed current lab, radiology, and data and agree.      Results from last 7 days  Lab Units 17  0511 17  0358   WBC 10*3/mm3 12.25* 15.35*   HEMOGLOBIN g/dL 10.7* 13.1   HEMATOCRIT % 31.6* 39.2   PLATELETS 10*3/mm3 176 295       Results from last 7 days  Lab Units  09/22/17  1723 09/22/17  0511 09/21/17  0358   SODIUM mmol/L  --  136 138   POTASSIUM mmol/L 4.4 3.5 4.1   CHLORIDE mmol/L  --  102 104   CO2 mmol/L  --  30.0 23.0   BUN mg/dL  --  17 18   CREATININE mg/dL  --  1.30 1.30   GLUCOSE mg/dL  --  103* 269*   CALCIUM mg/dL  --  8.1* 8.7   ALT (SGPT) U/L  --   --  22   AST (SGOT) U/L  --   --  30     BNP   Date Value Ref Range Status   09/22/2017 847.0 (H) 0.0 - 100.0 pg/mL Final     No results found for: PHART       Imaging Results (last 24 hours)     Procedure Component Value Units Date/Time    XR Chest 2 View [[id number]] Collected:  09/22/17 1659     Updated:  09/22/17 1710    Narrative:       EXAMINATION: XR CHEST 2 VW- 09/22/2017     INDICATION: f/u pulmonary edema; J96.01-Acute respiratory failure with  hypoxia; I50.9-Heart failure, unspecified; J81.0-Acute pulmonary edema;  I42.9-Cardiomyopathy, unspecified; I10-Essential (primary) hypertension;  I50.21-Acute systolic (congestive) heart failure      COMPARISON: Chest x-ray 09/21/2017     FINDINGS: Cardiomediastinal contour unchanged with decreased pulmonary  vascular congestion when compared to prior. Decreased bibasilar  opacities as well with improved aeration persistent bilateral effusions  similar to prior. No new focal airspace opacity or parenchymal disease.  No discrete pneumothorax.           Impression:       Decreased central pulmonary vascular congestion and  bibasilar opacifications when compared to prior.      D:  09/22/2017  E:  09/22/2017     This report was finalized on 9/22/2017 5:08 PM by Dr. Eduardo Hoffman.           Repeat chest x-ray personally reviewed, agree with official interpretation of decrease in pulmonary vascular congestion from initial study.    Results for orders placed during the hospital encounter of 09/21/17   Adult Transthoracic Echo Limited W/ Cont if Necessary Per Protocol    Narrative · Left ventricular systolic function is moderately decreased. Estimated EF   = 30%.  · Mild  mitral valve regurgitation is present  · There is a large size left pleural effusion.          I have reviewed the medications.    Assessment/Plan   Assessment / Plan     Hospital Problem List     * (Principal)Acute systolic heart failure    Overview Addendum 9/21/2017  2:51 PM by LOREN Mullen     · NYHA Class III-IV         Hypertensive Emergency on chronic Essential hypertension (Chronic)    Acute hypoxemic respiratory failure    Orthopnea    Bundle branch block, left (Chronic)    Overview Addendum 9/6/2017 11:20 AM by LOREN Mullen     · Known, not new.  Cardiomyopathy likely due to chronic LBBB with dyssynchronization of right and left ventricles.           CKD (chronic kidney disease) stage 3, GFR 30-59 ml/min (Chronic)    Non-ischemic cardiomyopathy    Overview Addendum 9/6/2017 11:19 AM by LOREN Mullen     · Echo (09/06/2017): LVEF 35%.  · Echo (07/28/2017): LVEF 21 - 30%. Hypokinesis of the septal and anterior segments. Grade II diastolic dysfunction. Mild AS. Moderate MR. Mild WA and TR. RVSP= 45 mmHg  · Cardiac cath (07/28/2017): Normal coronary arteries. LVEF 45-50%                  Brief Hospital Course to date:  Sylvia Jalloh is a 83 y.o. female with a past history of CKD III, HTN, hypothyroidism, and chronic LV systolic HF due to nonischemic cardiomyopathy who presented to the [facility] ER in flash pulmonary edema due to hypertensive emergency and acute LV systolic HF with acute hypoxemic respiratory failure:        Plan:  Acute Hypoxemic Respiratory Failure due to V/Q mismatch from pulmonary edema, improved   Acute on Chronic LV Systolic HF with LVEF 30% due to NICM, ACC C NYHA II, improved  Flash Pulmonary Edema, resolved s/p BIPAP and nitropaste at admission  - Precipitant of acute episode appears to be uncontrolled hypertension and possible increased salt load from patient eating at restaurant on day of admission  -Continue IV diuresis, monitor strict I/O, goal to  diuresis until patient is on room air comfortably with exertion  -Continue carvedilol, Entresto added by cardiology today  -Wean supplemental oxygen  --Repeat echocardiogram stable from prior  -Patient with negative LHC 7/2017  -Appreciate Dr. Vega's assistance    Hypertensive Emergency on Chronic Essential hypertension, improved   - Continue coreg, Entresto, loop diuretic  -Monitor closely, plan for renal artery duplex to evaluate for renal artery stenosis     CKD III (baseline 1.1 - 1.4), stable  - Monitor renal function closely while inpatient, especially while patient undergoing diuresis     VHD with hx of mild MS, moderate MR, mild HI/TR  Chronic Left Bundle Branch Block, ? Contributing to NICM  Hypothyroidism    DVT prophylaxis: SQ heparin  CODE STATUS: Full Code     Disposition: I expect the patient to be discharged home in 1-2 days.    Donny Xavier MD  09/22/17  7:26 PM

## 2017-09-23 ENCOUNTER — APPOINTMENT (OUTPATIENT)
Dept: CARDIOLOGY | Facility: HOSPITAL | Age: 82
End: 2017-09-23

## 2017-09-23 LAB
ANION GAP SERPL CALCULATED.3IONS-SCNC: 8 MMOL/L (ref 3–11)
BUN BLD-MCNC: 20 MG/DL (ref 9–23)
BUN/CREAT SERPL: 16.7 (ref 7–25)
CALCIUM SPEC-SCNC: 8.8 MG/DL (ref 8.7–10.4)
CHLORIDE SERPL-SCNC: 104 MMOL/L (ref 99–109)
CO2 SERPL-SCNC: 28 MMOL/L (ref 20–31)
CREAT BLD-MCNC: 1.2 MG/DL (ref 0.6–1.3)
GFR SERPL CREATININE-BSD FRML MDRD: 43 ML/MIN/1.73
GLUCOSE BLD-MCNC: 97 MG/DL (ref 70–100)
MAGNESIUM SERPL-MCNC: 2.6 MG/DL (ref 1.3–2.7)
POTASSIUM BLD-SCNC: 3.8 MMOL/L (ref 3.5–5.5)
SODIUM BLD-SCNC: 140 MMOL/L (ref 132–146)

## 2017-09-23 PROCEDURE — 99233 SBSQ HOSP IP/OBS HIGH 50: CPT | Performed by: HOSPITALIST

## 2017-09-23 PROCEDURE — 93975 VASCULAR STUDY: CPT

## 2017-09-23 PROCEDURE — 25010000002 FUROSEMIDE PER 20 MG: Performed by: NURSE PRACTITIONER

## 2017-09-23 PROCEDURE — 25010000002 HEPARIN (PORCINE) PER 1000 UNITS: Performed by: HOSPITALIST

## 2017-09-23 PROCEDURE — 80048 BASIC METABOLIC PNL TOTAL CA: CPT | Performed by: INTERNAL MEDICINE

## 2017-09-23 PROCEDURE — 99233 SBSQ HOSP IP/OBS HIGH 50: CPT | Performed by: INTERNAL MEDICINE

## 2017-09-23 PROCEDURE — 83735 ASSAY OF MAGNESIUM: CPT | Performed by: HOSPITALIST

## 2017-09-23 RX ORDER — SPIRONOLACTONE 25 MG/1
25 TABLET ORAL DAILY
Status: DISCONTINUED | OUTPATIENT
Start: 2017-09-23 | End: 2017-09-24 | Stop reason: HOSPADM

## 2017-09-23 RX ORDER — CARVEDILOL 12.5 MG/1
25 TABLET ORAL 2 TIMES DAILY WITH MEALS
Status: DISCONTINUED | OUTPATIENT
Start: 2017-09-23 | End: 2017-09-24 | Stop reason: HOSPADM

## 2017-09-23 RX ADMIN — HEPARIN SODIUM 5000 UNITS: 5000 INJECTION, SOLUTION INTRAVENOUS; SUBCUTANEOUS at 20:17

## 2017-09-23 RX ADMIN — CARVEDILOL 25 MG: 12.5 TABLET, FILM COATED ORAL at 17:20

## 2017-09-23 RX ADMIN — SACUBITRIL AND VALSARTAN 2 TABLET: 24; 26 TABLET, FILM COATED ORAL at 20:17

## 2017-09-23 RX ADMIN — CARVEDILOL 12.5 MG: 12.5 TABLET, FILM COATED ORAL at 08:04

## 2017-09-23 RX ADMIN — ESTRADIOL 1 MG: 0.5 TABLET ORAL at 08:06

## 2017-09-23 RX ADMIN — ACETAMINOPHEN 650 MG: 325 TABLET, FILM COATED ORAL at 22:45

## 2017-09-23 RX ADMIN — LEVOTHYROXINE SODIUM 112 MCG: 112 TABLET ORAL at 05:30

## 2017-09-23 RX ADMIN — FUROSEMIDE 40 MG: 10 INJECTION, SOLUTION INTRAMUSCULAR; INTRAVENOUS at 20:17

## 2017-09-23 RX ADMIN — FUROSEMIDE 40 MG: 10 INJECTION, SOLUTION INTRAMUSCULAR; INTRAVENOUS at 08:04

## 2017-09-23 RX ADMIN — SPIRONOLACTONE 25 MG: 25 TABLET, FILM COATED ORAL at 10:50

## 2017-09-23 RX ADMIN — HEPARIN SODIUM 5000 UNITS: 5000 INJECTION, SOLUTION INTRAVENOUS; SUBCUTANEOUS at 10:50

## 2017-09-23 RX ADMIN — SACUBITRIL AND VALSARTAN 1 TABLET: 24; 26 TABLET, FILM COATED ORAL at 08:06

## 2017-09-23 NOTE — PLAN OF CARE
Problem: Respiratory Insufficiency (Adult)  Goal: Identify Related Risk Factors and Signs and Symptoms  Outcome: Ongoing (interventions implemented as appropriate)    09/22/17 3617   Respiratory Insufficiency   Related Risk Factors (Respiratory Insufficiency) activity intolerance;medication effects   Signs and Symptoms (Respiratory Insufficiency) blood pressure/heart rate changes;breathing pattern changes;cough (productive/ineffective);decreased oxygen saturation       Goal: Acid/Base Balance  Outcome: Ongoing (interventions implemented as appropriate)    09/23/17 0357   Respiratory Insufficiency (Adult)   Acid/Base Balance making progress toward outcome       Goal: Effective Ventilation  Outcome: Ongoing (interventions implemented as appropriate)    09/23/17 0357   Respiratory Insufficiency (Adult)   Effective Ventilation making progress toward outcome         Problem: Patient Care Overview (Adult)  Goal: Plan of Care Review  Outcome: Ongoing (interventions implemented as appropriate)    09/23/17 0357   Patient Care Overview   Progress improving   Coping/Psychosocial Response Interventions   Plan Of Care Reviewed With patient;family       Goal: Adult Individualization and Mutuality  Outcome: Ongoing (interventions implemented as appropriate)  Goal: Discharge Needs Assessment  Outcome: Ongoing (interventions implemented as appropriate)    09/21/17 1300 09/22/17 1008 09/23/17 0357   Discharge Needs Assessment   Concerns To Be Addressed --  --  denies needs/concerns at this time   Equipment Needed After Discharge --  none --    Discharge Disposition --  --  still a patient   Current Health   Anticipated Changes Related to Illness --  --  none   Living Environment   Transportation Available family or friend will provide --  --    Self-Care   Equipment Currently Used at Home --  bath bench --          Problem: Cardiac Output, Decreased (Adult)  Goal: Identify Related Risk Factors and Signs and Symptoms  Outcome: Ongoing  (interventions implemented as appropriate)    09/23/17 0357   Cardiac Output, Decreased   Cardiac Output, Decreased: Related Risk Factors medication effects   Signs and Symptoms (Cardiac Output Decreased) dyspnea;shortness of breath;weight gain;weakness/activity intolerance       Goal: Adequate Cardiac Output/Effective Tissue Perfusion  Outcome: Ongoing (interventions implemented as appropriate)    09/23/17 0357   Cardiac Output, Decreased (Adult)   Adequate Cardiac Output/Effective Tissue Perfusion making progress toward outcome         Problem: Fall Risk (Adult)  Goal: Identify Related Risk Factors and Signs and Symptoms  Outcome: Ongoing (interventions implemented as appropriate)    09/23/17 0357   Fall Risk   Fall Risk: Signs and Symptoms presence of risk factors       Goal: Absence of Falls  Outcome: Ongoing (interventions implemented as appropriate)    09/23/17 0357   Fall Risk (Adult)   Absence of Falls making progress toward outcome

## 2017-09-23 NOTE — PROGRESS NOTES
"[facility] at [facility]  IP Progress Note   LOS: 2 days   Patient Care Team:  Eddie Flanagan MD as PCP - General (Family Medicine)    Chief Complaint: Follow up for Systolic Heart Failure and uncontrolled hypertension    Subjective Had another \"episode\" this am.  Sudden dyspnea, nurse reports sats where normal on RA. No Chest pain.  Had a renal artery duplex which was technically limited study but did not show any critical stenosis, official report pending.  Quite comfortable now.    Objective   Patient Active Problem List    Diagnosis   • Acute hypoxemic respiratory failure [J96.01]   • Non-ischemic cardiomyopathy [I42.9]     Overview Note:     · Echo (09/06/2017): LVEF 35%.  · Echo (07/28/2017): LVEF 21 - 30%. Hypokinesis of the septal and anterior segments. Grade II diastolic dysfunction. Mild AS. Moderate MR. Mild SD and TR. RVSP= 45 mmHg  · Cardiac cath (07/28/2017): Normal coronary arteries. LVEF 45-50%  · Echo 9-22-17:  · Left ventricular systolic function is moderately decreased. Estimated EF = 30%.  · Mild mitral valve regurgitation is present  · There is a large size left pleural effusion.     • Orthopnea [R06.01]   • Hypertensive Emergency on chronic Essential hypertension [I10]   • Hypothyroidism [E03.9]   • Bundle branch block, left [I44.7]     Overview Note:     · Known, not new.  Cardiomyopathy likely due to chronic LBBB with dyssynchronization of right and left ventricles.       • CKD (chronic kidney disease) stage 3, GFR 30-59 ml/min [N18.3]   • Acute systolic heart failure [I50.21]     Overview Note:     · NYHA Class III-IV           Tele: Sinus Rythym    Vitals:  Blood pressure 147/58, pulse 69, temperature 98.1 °F (36.7 °C), temperature source Oral, resp. rate 18, height 61\" (154.9 cm), weight 119 lb (54 kg), SpO2 97 %. SBP on exam 162    Intake/Output Summary (Last 24 hours) at 09/23/17 0801  Last data filed at 09/23/17 0454   Gross per 24 hour   Intake           "      0 ml   Output             1600 ml   Net            -1600 ml       Physical Exam:      General: alert, no acute distress, acyanotic, well developed, well nourished   Chest: Clear Auscultation and No Wheezes   CV: Heart sounds are normal.  Regular rate and rhythm without murmur, gallop or rub.   Extremities: no edema    Results Review:     I reviewed the patient's new clinical results.      Results from last 7 days  Lab Units 09/22/17  0511   WBC 10*3/mm3 12.25*   HEMOGLOBIN g/dL 10.7*   HEMATOCRIT % 31.6*   PLATELETS 10*3/mm3 176       Results from last 7 days  Lab Units 09/23/17  0614  09/21/17  0358   SODIUM mmol/L 140  < > 138   POTASSIUM mmol/L 3.8  < > 4.1   CHLORIDE mmol/L 104  < > 104   CO2 mmol/L 28.0  < > 23.0   BUN mg/dL 20  < > 18   CREATININE mg/dL 1.20  < > 1.30   CALCIUM mg/dL 8.8  < > 8.7   BILIRUBIN mg/dL  --   --  1.3*   ALK PHOS U/L  --   --  88   ALT (SGPT) U/L  --   --  22   AST (SGOT) U/L  --   --  30   GLUCOSE mg/dL 97  < > 269*   < > = values in this interval not displayed.        Scheduled Meds:  carvedilol 12.5 mg Oral BID With Meals   estradiol 1 mg Oral Daily   furosemide 40 mg Intravenous Q12H   heparin (porcine) 5,000 Units Subcutaneous Q12H   levothyroxine 112 mcg Oral Q AM   pharmacy consult - MTM  Does not apply Daily   sacubitril-valsartan 1 tablet Oral Q12H       Assessment/Plan       Principal Problem:    Acute systolic heart failure  Active Problems:    Orthopnea    Hypertensive Emergency on chronic Essential hypertension    Bundle branch block, left    CKD (chronic kidney disease) stage 3, GFR 30-59 ml/min    Non-ischemic cardiomyopathy    Acute hypoxemic respiratory failure, sat 97% on Room air ( dyspnea with normal sats could be panic disorder)        Plan:  1. Blood pressure remains elevated, we'll further increase the dose of carvedilol and Entresto.  2. Continue diuresis.  Add aldactone  3. Will check a renal artery duplex to rule out renal artery stenosis.     Bebeto  JABIER Burnham  09/23/17  8:01 AM    I have seen and examined the patient, case was discussed with the physician extender, reviewed the above note, necessary changes were made and I agree with the final note.   Jose Carlos Keith MD, FACC, [facility]

## 2017-09-23 NOTE — PROGRESS NOTES
[facility] Medicine Services  PROGRESS NOTE    Patient Name: Sylvia Jalloh  : 1934  MRN: [id number]    Date of Admission: 2017  Length of Stay: 2  Primary Care Physician: Eddie Flanagan MD    Subjective   Subjective     CC: Follow up heart failure    HPI:  This morning, patient episode of lightheadedness, dyspnea, and chest tightness associated with a blood pressure of 175/85.  She also noted orthopnea as well as exertional dyspnea.  Symptoms were similar but less severe than that she presented to the ER with.  No diaphoresis, chest pressure, or confusion.  She did note some dry cough with this.  Patient was seen immediately following this and was administered her morning medications including her IV Lasix.    Review of Systems   Constitutional: Negative for chills and fever.   Respiratory: Positive for cough, chest tightness and shortness of breath.    Cardiovascular: Negative for chest pain and palpitations.   Gastrointestinal: Negative for abdominal pain, nausea and vomiting.   Neurological: Positive for light-headedness.     Objective   Objective     Vital Signs: Temp:  [96.3 °F (35.7 °C)-98.1 °F (36.7 °C)] 98.1 °F (36.7 °C)  Heart Rate:  [63-81] 69  Resp:  [16-18] 18  BP: (147-167)/(57-85) 147/58     Physical Exam:  Constitutional: No acute distress, awake, alert  HENT: NCAT, mucous membranes moist  Respiratory: Bibasilar crackles to lower third, respiratory effort normal   Cardiovascular: RRR, no murmurs, rubs, or gallops, palpable pedal pulses bilaterally  Gastrointestinal: Positive bowel sounds, soft, nontender, nondistended  Musculoskeletal: No bilateral ankle edema  Psychiatric: Oriented x 3, appropriate affect, cooperative  Neurologic: Strength symmetric in all extremities, CN grossly intact to confrontation, normal gait    Results Reviewed:  I have personally reviewed current lab, radiology, and data and agree.      Results from last 7 days  Lab  Units 09/22/17  0511 09/21/17  0358   WBC 10*3/mm3 12.25* 15.35*   HEMOGLOBIN g/dL 10.7* 13.1   HEMATOCRIT % 31.6* 39.2   PLATELETS 10*3/mm3 176 295       Results from last 7 days  Lab Units 09/23/17  0614 09/22/17  1723 09/22/17  0511 09/21/17  0358   SODIUM mmol/L 140  --  136 138   POTASSIUM mmol/L 3.8 4.4 3.5 4.1   CHLORIDE mmol/L 104  --  102 104   CO2 mmol/L 28.0  --  30.0 23.0   BUN mg/dL 20  --  17 18   CREATININE mg/dL 1.20  --  1.30 1.30   GLUCOSE mg/dL 97  --  103* 269*   CALCIUM mg/dL 8.8  --  8.1* 8.7   ALT (SGPT) U/L  --   --   --  22   AST (SGOT) U/L  --   --   --  30     BNP   Date Value Ref Range Status   09/22/2017 847.0 (H) 0.0 - 100.0 pg/mL Final       Imaging Results (last 24 hours)     Procedure Component Value Units Date/Time    XR Chest 2 View [[id number]] Collected:  09/22/17 1659     Updated:  09/22/17 1710    Narrative:       EXAMINATION: XR CHEST 2 VW- 09/22/2017     INDICATION: f/u pulmonary edema; J96.01-Acute respiratory failure with  hypoxia; I50.9-Heart failure, unspecified; J81.0-Acute pulmonary edema;  I42.9-Cardiomyopathy, unspecified; I10-Essential (primary) hypertension;  I50.21-Acute systolic (congestive) heart failure      COMPARISON: Chest x-ray 09/21/2017     FINDINGS: Cardiomediastinal contour unchanged with decreased pulmonary  vascular congestion when compared to prior. Decreased bibasilar  opacities as well with improved aeration persistent bilateral effusions  similar to prior. No new focal airspace opacity or parenchymal disease.  No discrete pneumothorax.           Impression:       Decreased central pulmonary vascular congestion and  bibasilar opacifications when compared to prior.      D:  09/22/2017  E:  09/22/2017     This report was finalized on 9/22/2017 5:08 PM by Dr. Eduardo Hoffman.           Renal artery duplex pending    Results for orders placed during the hospital encounter of 09/21/17   Adult Transthoracic Echo Limited W/ Cont if Necessary Per Protocol     Narrative · Left ventricular systolic function is moderately decreased. Estimated EF   = 30%.  · Mild mitral valve regurgitation is present  · There is a large size left pleural effusion.          I have reviewed the medications.    Assessment/Plan   Assessment / Plan     Hospital Problem List     * (Principal)Acute systolic heart failure    Overview Addendum 9/21/2017  2:51 PM by LOREN Mullen     · NYHA Class III-IV         Hypertensive Emergency on chronic Essential hypertension (Chronic)    Acute hypoxemic respiratory failure    Orthopnea    Bundle branch block, left (Chronic)    Overview Addendum 9/6/2017 11:20 AM by LOREN Mullen     · Known, not new.  Cardiomyopathy likely due to chronic LBBB with dyssynchronization of right and left ventricles.           CKD (chronic kidney disease) stage 3, GFR 30-59 ml/min (Chronic)    Non-ischemic cardiomyopathy    Overview Addendum 9/6/2017 11:19 AM by LOREN Mullen     · Echo (09/06/2017): LVEF 35%.  · Echo (07/28/2017): LVEF 21 - 30%. Hypokinesis of the septal and anterior segments. Grade II diastolic dysfunction. Mild AS. Moderate MR. Mild HI and TR. RVSP= 45 mmHg  · Cardiac cath (07/28/2017): Normal coronary arteries. LVEF 45-50%                  Brief Hospital Course to date:  Sylvia Jalloh is a 83 y.o. female with a past history of CKD III, HTN, hypothyroidism, and chronic LV systolic HF due to nonischemic cardiomyopathy who presented to the [facility] ER in flash pulmonary edema due to hypertensive emergency and acute LV systolic HF with acute hypoxemic respiratory failure:        Plan:  Acute Hypoxemic Respiratory Failure due to V/Q mismatch from pulmonary edema, improved   Acute on Chronic LV Systolic HF with LVEF 30% due to NICM, ACC C NYHA II, improved  Flash Pulmonary Edema, s/p BIPAP and nitropaste at admission, recurrent symptoms today   - Precipitant of acute episode appears to be uncontrolled hypertension and possible  increased salt load from patient eating at restaurant on day of admission  - Patient with recurrent symptoms this morning associated with uncontrolled hypertension  -Daily carvedilol, Entresto  -Agree with addition of Aldactone  -Follow-up renal artery duplex  -Continue IV diuresis, patient has responded well to Lasix so far  -Wean supplemental oxygen (remains with borderline sats on RA)  --Repeat echocardiogram stable from prior  -Patient with negative LHC 7/2017  -Appreciate Cardiology's assistance    Hypertensive Emergency on Chronic Essential hypertension, improved   - Continue coreg, Entresto, loop diuretic  -Monitor response to aldactone as above  - IV hydralazine and labetalol available prn      CKD III (baseline 1.1 - 1.4), stable  - Repeat CBC daily with on IV lasix and with addition of spironolactone     VHD with hx of mild MS, moderate MR, mild RI/TR  Chronic Left Bundle Branch Block, ? Contributing to NICM  Hypothyroidism    DVT prophylaxis: SQ heparin  CODE STATUS: Full Code     Disposition: I expect the patient to be discharged home in 1-2 days once BP control improved and volume status is optimized.    Pt remains with active, high risk conditions     Donny Xavier MD  09/23/17  7:52 AM

## 2017-09-24 VITALS
RESPIRATION RATE: 16 BRPM | TEMPERATURE: 97.4 F | BODY MASS INDEX: 21.95 KG/M2 | WEIGHT: 116.25 LBS | HEART RATE: 63 BPM | OXYGEN SATURATION: 97 % | SYSTOLIC BLOOD PRESSURE: 123 MMHG | DIASTOLIC BLOOD PRESSURE: 63 MMHG | HEIGHT: 61 IN

## 2017-09-24 LAB
ALBUMIN SERPL-MCNC: 3.4 G/DL (ref 3.2–4.8)
ANION GAP SERPL CALCULATED.3IONS-SCNC: 9 MMOL/L (ref 3–11)
BUN BLD-MCNC: 24 MG/DL (ref 9–23)
BUN/CREAT SERPL: 17.1 (ref 7–25)
CALCIUM SPEC-SCNC: 8.4 MG/DL (ref 8.7–10.4)
CHLORIDE SERPL-SCNC: 104 MMOL/L (ref 99–109)
CO2 SERPL-SCNC: 28 MMOL/L (ref 20–31)
CREAT BLD-MCNC: 1.4 MG/DL (ref 0.6–1.3)
GFR SERPL CREATININE-BSD FRML MDRD: 36 ML/MIN/1.73
GLUCOSE BLD-MCNC: 92 MG/DL (ref 70–100)
PHOSPHATE SERPL-MCNC: 4.5 MG/DL (ref 2.4–5.1)
POTASSIUM BLD-SCNC: 3.9 MMOL/L (ref 3.5–5.5)
SODIUM BLD-SCNC: 141 MMOL/L (ref 132–146)

## 2017-09-24 PROCEDURE — 80069 RENAL FUNCTION PANEL: CPT | Performed by: HOSPITALIST

## 2017-09-24 PROCEDURE — 25010000002 FUROSEMIDE PER 20 MG: Performed by: NURSE PRACTITIONER

## 2017-09-24 PROCEDURE — 25010000002 HEPARIN (PORCINE) PER 1000 UNITS: Performed by: HOSPITALIST

## 2017-09-24 PROCEDURE — 99239 HOSP IP/OBS DSCHRG MGMT >30: CPT | Performed by: HOSPITALIST

## 2017-09-24 RX ORDER — SPIRONOLACTONE 25 MG/1
25 TABLET ORAL DAILY
Qty: 30 TABLET | Refills: 1 | Status: SHIPPED | OUTPATIENT
Start: 2017-09-25

## 2017-09-24 RX ORDER — CARVEDILOL 25 MG/1
25 TABLET ORAL 2 TIMES DAILY WITH MEALS
Qty: 30 TABLET | Refills: 1 | Status: SHIPPED | OUTPATIENT
Start: 2017-09-24 | End: 2019-03-27

## 2017-09-24 RX ADMIN — CARVEDILOL 25 MG: 12.5 TABLET, FILM COATED ORAL at 08:30

## 2017-09-24 RX ADMIN — SPIRONOLACTONE 25 MG: 25 TABLET, FILM COATED ORAL at 08:31

## 2017-09-24 RX ADMIN — SACUBITRIL AND VALSARTAN 2 TABLET: 24; 26 TABLET, FILM COATED ORAL at 08:31

## 2017-09-24 RX ADMIN — FUROSEMIDE 40 MG: 10 INJECTION, SOLUTION INTRAMUSCULAR; INTRAVENOUS at 08:31

## 2017-09-24 RX ADMIN — ESTRADIOL 1 MG: 0.5 TABLET ORAL at 08:31

## 2017-09-24 RX ADMIN — HEPARIN SODIUM 5000 UNITS: 5000 INJECTION, SOLUTION INTRAVENOUS; SUBCUTANEOUS at 08:31

## 2017-09-24 RX ADMIN — ACETAMINOPHEN 650 MG: 325 TABLET, FILM COATED ORAL at 05:59

## 2017-09-24 RX ADMIN — LEVOTHYROXINE SODIUM 112 MCG: 112 TABLET ORAL at 05:57

## 2017-09-24 NOTE — PROGRESS NOTES
Continued Stay Note  [facility]     Patient Name: Sylvia Jalloh  MRN: [id number]  Today's Date: 9/24/2017    Admit Date: 9/21/2017          Discharge Plan       09/24/17 1334    Case Management/Social Work Plan    Additional Comments Entresto PA initiated with [place].  A determination will be made within 24-72 hours.   has provided pt with a 1 month free trial offer card.  Will cont to follow.              Discharge Codes     None        Expected Discharge Date and Time     Expected Discharge Date Expected Discharge Time    Sep 24, 2017             Juanita Ponce

## 2017-09-24 NOTE — DISCHARGE SUMMARY
[facility] Medicine Services  DISCHARGE SUMMARY       Date of Admission: 9/21/2017  Date of Discharge:  9/24/2017  Primary Care Physician: Eddie Flanagan MD  Consulting Physician(s)     Provider Relationship    Thiago Vega MD Consulting Physician    Jose Carlos Keith MD Consulting Physician          Discharge Diagnoses:  Active Hospital Problems (** Indicates Principal Problem)    Diagnosis Date Noted   • **Acute systolic heart failure [I50.21] 07/27/2017     Priority: High     · NYHA Class III-IV     • Acute hypoxemic respiratory failure [J96.01] 09/21/2017     Priority: High   • Hypertensive Emergency on chronic Essential hypertension [I10] 07/27/2017     Priority: High   • Non-ischemic cardiomyopathy [I42.9] 07/30/2017     · Echo (09/06/2017): LVEF 35%.  · Echo (07/28/2017): LVEF 21 - 30%. Hypokinesis of the septal and anterior segments. Grade II diastolic dysfunction. Mild AS. Moderate MR. Mild MN and TR. RVSP= 45 mmHg  · Cardiac cath (07/28/2017): Normal coronary arteries. LVEF 45-50%  · Echo 9-22-17:  · Left ventricular systolic function is moderately decreased. Estimated EF = 30%.  · Mild mitral valve regurgitation is present  · There is a large size left pleural effusion.     • CKD (chronic kidney disease) stage 3, GFR 30-59 ml/min [N18.3] 07/27/2017   • Bundle branch block, left [I44.7] 07/27/2017     · Known, not new.  Cardiomyopathy likely due to chronic LBBB with dyssynchronization of right and left ventricles.       • Orthopnea [R06.01] 07/27/2017      Resolved Hospital Problems    Diagnosis Date Noted Date Resolved   No resolved problems to display.       Presenting Problem/History of Present Illness  Acute hypoxemic respiratory failure [J96.01]     Chief Complaint on Day of Discharge: Follow-up heart failure  History of Present Illness on Day of Discharge: Patient is doing well today.  She's had no further episodes of chest pressure, dyspnea, or lightheadedness.   Blood pressures have been controlled for 24 hours.  She is saturating 99% on room air during my visit.  Agreeable to discharge home.    Hospital Course  Patient is an 83-year-old female with a past medical history of chronic kidney disease stage III (baseline 1.1 - 1.4), essential hypertension, chronic LBBB, hypothyroidism, and chronic left ventricular systolic heart failure due to nonischemic cardiomyopathy who presented to [facility] ER early on 9/21 with acute decompensation of systolic heart failure, acute hypoxemic respiratory failure, hypertensive emergency, and flash pulmonary edema. Initial SBP was greater than 200.  Patient was administered Nitropaste in the emergency department and placed on BiPAP with resolution of her flash pulmonary edema.  She was admitted to Hospital medicine and received IV Lasix twice daily with total diuresis of approximately 5.75 L.  The precipitant of her flash pulmonary edema appears to be uncontrolled hypertension and may have been provoked by excess dietary sodium intake vs. Progression of her chronic disease with need for more aggressive pharmacotherapy.  A repeat echocardiogram was pursued while inpatient which showed stable ejection fraction of 30%.  Patient has a history of a negative left heart catheterization in July 2017 and thus no further ischemic evaluation was pursued.  Her troponin at admission was negative.  At time of admission, patient was saturating in the mid to upper 80s on room air which improved with diuresis and her respiratory failure resolved prior to discharge.  Patient had a recurrent episode of symptomatic hypertension on 9/23 with blood pressure in the 170s systolic.  Renal artery duplex to rule out renal artery stenosis was negative.  Her medications were aggressively titrated and she tolerated addition of Entresto, Aldactone, and increased dose of her oral carvedilol.  Her blood pressure was within goal range for 24 hours prior to discharge.    The  patient's primary cardiologist, Dr. Vega, was consulted and assisted in medical management.  Plan is for outpatient follow-up in heart failure clinic within the next 1-2 weeks to reassess her volume status and to check a BMP to further guide adjustment of her medications as well as to guide how much potassium patient should be taking in the setting of addition of Aldactone.  On day of discharge, patient was given extensive instructions on the importance of daily weights, regular monitoring of her blood pressure and keeping a blood pressure diary, fluid restriction of less than 2 L daily, and a sodium restricted diet.  Patient's daughter and son were present during this discussion and expressed understanding of plan of care.      Procedures Performed  Results for orders placed during the hospital encounter of 09/21/17   Adult Transthoracic Echo Limited W/ Cont if Necessary Per Protocol    Narrative · Left ventricular systolic function is moderately decreased. Estimated EF   = 30%.  · Mild mitral valve regurgitation is present  · There is a large size left pleural effusion.          Consults:   Consults     Date and Time Order Name Status Description    9/21/2017 1324 Inpatient Consult to Cardiology Completed     9/6/2017 0819 Inpatient Consult to Cardiology Completed           Pertinent Test Results:      Results from last 7 days  Lab Units 09/22/17  0511 09/21/17  0358   WBC 10*3/mm3 12.25* 15.35*   HEMOGLOBIN g/dL 10.7* 13.1   HEMATOCRIT % 31.6* 39.2   PLATELETS 10*3/mm3 176 295         Results from last 7 days  Lab Units 09/24/17  0537 09/23/17  0614 09/22/17  1723 09/22/17  0511 09/21/17  0358   SODIUM mmol/L 141 140  --  136 138   POTASSIUM mmol/L 3.9 3.8 4.4 3.5 4.1   CHLORIDE mmol/L 104 104  --  102 104   CO2 mmol/L 28.0 28.0  --  30.0 23.0   BUN mg/dL 24* 20  --  17 18   CREATININE mg/dL 1.40* 1.20  --  1.30 1.30   CALCIUM mg/dL 8.4* 8.8  --  8.1* 8.7   BILIRUBIN mg/dL  --   --   --   --  1.3*   ALK PHOS  U/L  --   --   --   --  88   ALT (SGPT) U/L  --   --   --   --  22   AST (SGOT) U/L  --   --   --   --  30   GLUCOSE mg/dL 92 97  --  103* 269*       Results from last 7 days  Lab Units 09/22/17  0511   CHOLESTEROL mg/dL 144   TRIGLYCERIDES mg/dL 88   HDL CHOL mg/dL 63*     BNP 9/20 1049  BNP 9/21 847  Troponin 9/20 0.01      Imaging Results (all)     Procedure Component Value Units Date/Time    XR Chest 1 View [[id number]]  (Abnormal) Collected:  09/21/17 0356     Updated:  09/21/17 0634    Narrative:       EXAM:    XR Chest, 1 View    EXAM DATE/TIME:    9/21/2017 3:56 AM    CLINICAL HISTORY:    83 years old, female; Signs and symptoms; Shortness of breath; Patient HX:   Chf/copd protocol    TECHNIQUE:    Single upright AP portable chest at 5:20 AM    COMPARISON:    CR - XR CHEST PA AND LATERAL 9/8/2017 11:41:24 AM    FINDINGS:    Diffuse density throughout the LEFT lung base, likely LEFT pleural effusion   with underlying atelectasis and/or infiltrate, increased.  Mild parenchymal density RIGHT lung base medially, new.  Likely small RIGHT pleural effusion.  Congestion of the central pulmonary vessels with interstitial and air space   densities perihilar regions bilaterally.  Mild asymmetric interstitial densities RIGHT upper lobe.  No pneumothorax.  Heart appears normal in size.  Mediastinum unremarkable.  Aortic calcifications.  Curvature thoracic spine convex to the RIGHT and thoracolumbar spine convex to   the LEFT.  Degenerative changes of the spine.  No acute bone abnormality.      Impression:         Interim development of likely congestive heart failure with mild pulmonary   edema.    Increasing LEFT pleural effusion with underlying atelectasis and/or infiltrate.    New small RIGHT pleural effusion with possible atelectasis or infiltrate RIGHT   lung base.    Additional nonacute findings as noted.    THIS DOCUMENT HAS BEEN ELECTRONICALLY SIGNED BY CARYN LOBO MD    XR Chest 2 View [[id number]] Collected:   "09/22/17 1659     Updated:  09/22/17 1710    Narrative:       EXAMINATION: XR CHEST 2 VW- 09/22/2017     INDICATION: f/u pulmonary edema; J96.01-Acute respiratory failure with  hypoxia; I50.9-Heart failure, unspecified; J81.0-Acute pulmonary edema;  I42.9-Cardiomyopathy, unspecified; I10-Essential (primary) hypertension;  I50.21-Acute systolic (congestive) heart failure      COMPARISON: Chest x-ray 09/21/2017     FINDINGS: Cardiomediastinal contour unchanged with decreased pulmonary  vascular congestion when compared to prior. Decreased bibasilar  opacities as well with improved aeration persistent bilateral effusions  similar to prior. No new focal airspace opacity or parenchymal disease.  No discrete pneumothorax.           Impression:       Decreased central pulmonary vascular congestion and  bibasilar opacifications when compared to prior.      D:  09/22/2017  E:  09/22/2017     This report was finalized on 9/22/2017 5:08 PM by Dr. Eduardo Hoffman.           Renal artery duplex was complete, official interpretation pending but verbal report per Dr. Trevino is negative for renal artery stenosis        Condition on Discharge:  stable    Physical Exam on Discharge:/79  Pulse 67  Temp 97.4 °F (36.3 °C) (Oral)   Resp 18  Ht 61\" (154.9 cm)  Wt 116 lb 4 oz (52.7 kg)  SpO2 97%  BMI 21.97 kg/m2  Physical Exam  Constitutional: No acute distress, awake, alert  HENT: NCAT, mucous membranes moist  Respiratory: Clear to auscultation bilaterally, crackles have resolved, respiratory effort normal   Cardiovascular: RRR, no murmurs, rubs, or gallops, palpable pedal pulses bilaterally  Gastrointestinal: Positive bowel sounds, soft, nontender, nondistended  Musculoskeletal: No bilateral ankle edema  Psychiatric: Oriented x 3, appropriate affect, cooperative  Neurologic: Strength symmetric in all extremities, CN grossly intact to confrontation    Discharge Disposition  Home or Self Care    Discharge Medications   Yeimi" Sylvia NI   Home Medication Instructions MORIAH:[id number]    Printed on:09/24/17 0936   Medication Information                      carvedilol (COREG) 25 MG tablet  Take 1 tablet by mouth 2 (Two) Times a Day With Meals.             estradiol (ESTRACE) 1 MG tablet  Take 1 mg by mouth Daily.             furosemide (LASIX) 40 MG tablet  Take 1 tablet by mouth Daily.             levothyroxine (SYNTHROID, LEVOTHROID) 112 MCG tablet  Take 112 mcg by mouth Daily.             Multiple Vitamins-Minerals (MULTIVITAMIN ADULTS 50+) tablet  Take 1 tablet by mouth Daily.             potassium chloride (K-DUR,KLOR-CON) 10 MEQ CR tablet  Take 2 tablets by mouth Daily.             sacubitril-valsartan (ENTRESTO) 24-26 MG tablet  Take 2 tablets by mouth Every 12 (Twelve) Hours. Indications: Heart Failure             spironolactone (ALDACTONE) 25 MG tablet  Take 1 tablet by mouth Daily.             Vitamin D, Cholecalciferol, 1000 UNITS capsule  Take 1,000 Units by mouth Daily.                 Discharge Diet:   Diet Instructions     Diet: Regular, Cardiac; Thin       Discharge Diet:   Regular  Cardiac      Fluid Consistency:  Thin                 Discharge Care Plan / Instructions:  - Patient was given specific instructions by Dr. Vega and myself regarding need for twice daily blood pressure monitoring, follow a fluid restriction, daily weights and instructions on when to use extra doses of Lasix, and importance of a sodium restricted diet.        Activity at Discharge:   Activity Instructions     Activity as Tolerated                     Follow-up Appointments  Future Appointments  Date Time Provider Department Center   10/11/2017 11:30 AM Thiago Vega MD [facility] None   3/6/2018 2:30 PM Thiago Vega MD [facility] None     Additional Instructions for the Follow-ups that You Need to Schedule     Additional Discharge Follow-Up (Specify Provider)    As directed    To:  Dr. Vega   Follow Up:  1 Month       Discharge  Follow-up with PCP    As directed    Follow Up Details:  in 1-2 weeks       Basic Metabolic Panel    Sep 27, 2017 (Approximate)    This needs to be scheduled on the same day as follow up in Heart Failure clinic, prior to appointment                 Test Results Pending at Discharge       Donny Xavier MD 09/24/17 9:36 AM    Time: Discharge 40 min    Please note that portions of this note may have been completed with a voice recognition program. Efforts were made to edit the dictations, but occasionally words are mistranscribed.

## 2017-09-24 NOTE — PLAN OF CARE
Problem: Respiratory Insufficiency (Adult)  Goal: Identify Related Risk Factors and Signs and Symptoms  Outcome: Ongoing (interventions implemented as appropriate)  Goal: Acid/Base Balance  Outcome: Ongoing (interventions implemented as appropriate)  Goal: Effective Ventilation  Outcome: Ongoing (interventions implemented as appropriate)    Problem: Patient Care Overview (Adult)  Goal: Plan of Care Review  Outcome: Ongoing (interventions implemented as appropriate)    Problem: Cardiac Output, Decreased (Adult)  Goal: Adequate Cardiac Output/Effective Tissue Perfusion  Outcome: Ongoing (interventions implemented as appropriate)    Problem: Fall Risk (Adult)  Goal: Identify Related Risk Factors and Signs and Symptoms  Outcome: Ongoing (interventions implemented as appropriate)  Goal: Absence of Falls  Outcome: Ongoing (interventions implemented as appropriate)

## 2017-09-25 ENCOUNTER — DOCUMENTATION (OUTPATIENT)
Dept: CARDIAC REHAB | Facility: HOSPITAL | Age: 82
End: 2017-09-25

## 2017-09-25 LAB
BH CV ECHO MEAS - BSA(HAYCOCK): 1.5 M^2
BH CV ECHO MEAS - BSA: 1.5 M^2
BH CV ECHO MEAS - BZI_BMI: 22.5 KILOGRAMS/M^2
BH CV ECHO MEAS - BZI_METRIC_HEIGHT: 154.9 CM
BH CV ECHO MEAS - BZI_METRIC_WEIGHT: 54 KG
BH CV ECHO MEAS - DIST REN A EDV LEFT: 24.4 CM/SEC
BH CV ECHO MEAS - DIST REN A PSV LEFT: 124 CM/SEC
BH CV ECHO MEAS - DIST REN A RI LEFT: 0.8
BH CV ECHO MEAS - MID REN A EDV LEFT: -28.2 CM/SEC
BH CV ECHO MEAS - MID REN A PSV LEFT: -151 CM/SEC
BH CV ECHO MEAS - MID REN A RI LEFT: 0.81
BH CV ECHO MEAS - PROX REN A EDV LEFT: -46.7 CM/SEC
BH CV ECHO MEAS - PROX REN A PSV LEFT: -161 CM/SEC
BH CV ECHO MEAS - PROX REN A RI LEFT: 0.71
BH CV VAS BP LEFT ARM: NORMAL MMHG
BH CV VAS RENAL AORTIC MID EDV: 16.5 CM/S
BH CV VAS RENAL AORTIC MID PSV: 75.2 CM/S
BH CV XCLRA SUP ARC RI LEFT: 0.77
BH CV XLRA MEAS - CELIAC A EDV: 61.1 CM/SEC
BH CV XLRA MEAS - CELIAC A PSV: 335 CM/SEC
BH CV XLRA MEAS - KID L LEFT: 9.04 CM
BH CV XLRA MEAS - PROX SMA EDV: 25.7 CM/SEC
BH CV XLRA MEAS - PROX SMA PSV: 237 CM/SEC
BH CV XLRA MEAS - RENAL A ORG RI LEFT: 0.83
BH CV XLRA MEAS - SUP ARC PSV LEFT: 16.8 CM/SEC
BH CV XLRA MEAS - SUP REN AO EDV: 16.5 CM/SEC
BH CV XLRA MEAS - SUP REN AO PSV: 75.2 CM/SEC
BH CV XLRA MEAS - SUP REN AO RI: 0.78
BH CV XLRA MEAS - SUP REN AO S/D: 4.6
BH CV XLRA MEAS - SUP SEG EDV LEFT: 6.5 CM/SEC
BH CV XLRA MEAS - SUP SEG PSV LEFT: 44 CM/SEC
BH CV XLRA MEAS - SUP SEG RI LEFT: 0.85
BH CV XLRA MEAS DIST REN A EDV RIGHT: 40.1 CM/SEC
BH CV XLRA MEAS DIST REN A PSV RIGHT: 174 CM/SEC
BH CV XLRA MEAS DIST REN A RI RIGHT: 0.77
BH CV XLRA MEAS INF ARC EDV LEFT: 4.6 CM/SEC
BH CV XLRA MEAS INF ARC EDV RIGHT: 6.4 CM/SEC
BH CV XLRA MEAS INF ARC PSV LEFT: 22.5 CM/SEC
BH CV XLRA MEAS INF ARC PSV RIGHT: 33.5 CM/SEC
BH CV XLRA MEAS INF ARC RI LEFT: 0.79
BH CV XLRA MEAS INF ARC RI RIGHT: 0.81
BH CV XLRA MEAS INF SEG EDV LEFT: 7.1 CM/SEC
BH CV XLRA MEAS INF SEG EDV RIGHT: 9 CM/SEC
BH CV XLRA MEAS INF SEG PSV LEFT: 34.8 CM/SEC
BH CV XLRA MEAS INF SEG PSV RIGHT: 49.7 CM/SEC
BH CV XLRA MEAS INF SEG RI LEFT: 0.8
BH CV XLRA MEAS INF SEG RI RIGHT: 0.82
BH CV XLRA MEAS KID L RIGHT: 9.07 CM
BH CV XLRA MEAS MID REN A EDV RIGHT: -35.3 CM/SEC
BH CV XLRA MEAS MID REN A PSV RIGHT: -193 CM/SEC
BH CV XLRA MEAS MID REN A RI RIGHT: 0.82
BH CV XLRA MEAS PROX REN A EDV RIGHT: 26.1 CM/SEC
BH CV XLRA MEAS PROX REN A PSV RIGHT: 139 CM/SEC
BH CV XLRA MEAS PROX REN A RI RIGHT: 0.81
BH CV XLRA MEAS RAR LEFT: 2.15
BH CV XLRA MEAS RAR RIGHT: 2.57
BH CV XLRA MEAS RENAL A ORG EDV LEFT: -15.4 CM/SEC
BH CV XLRA MEAS RENAL A ORG EDV RIGHT: 30.4 CM/SEC
BH CV XLRA MEAS RENAL A ORG PSV LEFT: -88.3 CM/SEC
BH CV XLRA MEAS RENAL A ORG PSV RIGHT: 150 CM/SEC
BH CV XLRA MEAS RENAL A ORG RI RIGHT: 0.8
BH CV XLRA MEAS SUP ARC EDV RIGHT: -7.3 CM/SEC
BH CV XLRA MEAS SUP ARC PSV RIGHT: -33.9 CM/SEC
BH CV XLRA MEAS SUP ARC RI RIGHT: 0.78
BH CV XLRA MEAS SUP SEG EDV RIGHT: 12.4 CM/SEC
BH CV XLRA MEAS SUP SEG PSV RIGHT: 53.2 CM/SEC
BH CV XLRA MEAS SUP SEG RI RIGHT: 0.77
BH CV XLRA SUP ARC EDV LEFT: 3.8 CM/SEC

## 2017-09-27 ENCOUNTER — APPOINTMENT (OUTPATIENT)
Dept: GENERAL RADIOLOGY | Facility: HOSPITAL | Age: 82
End: 2017-09-27

## 2017-09-27 ENCOUNTER — HOSPITAL ENCOUNTER (EMERGENCY)
Facility: HOSPITAL | Age: 82
Discharge: HOME | End: 2017-09-27
Attending: EMERGENCY MEDICINE | Admitting: EMERGENCY MEDICINE

## 2017-09-27 VITALS
WEIGHT: 116 LBS | OXYGEN SATURATION: 96 % | BODY MASS INDEX: 22.78 KG/M2 | RESPIRATION RATE: 20 BRPM | HEIGHT: 60 IN | TEMPERATURE: 98 F | DIASTOLIC BLOOD PRESSURE: 70 MMHG | SYSTOLIC BLOOD PRESSURE: 129 MMHG | HEART RATE: 64 BPM

## 2017-09-27 DIAGNOSIS — Z91.89 AT RISK FOR POLYPHARMACY: ICD-10-CM

## 2017-09-27 DIAGNOSIS — F41.9 ANXIETY: ICD-10-CM

## 2017-09-27 DIAGNOSIS — I42.8 NON-ISCHEMIC CARDIOMYOPATHY (HCC): ICD-10-CM

## 2017-09-27 DIAGNOSIS — R42 DIZZINESS: ICD-10-CM

## 2017-09-27 DIAGNOSIS — D64.9 ANEMIA, UNSPECIFIED TYPE: ICD-10-CM

## 2017-09-27 DIAGNOSIS — N28.9 RENAL INSUFFICIENCY: ICD-10-CM

## 2017-09-27 DIAGNOSIS — H81.399 PERIPHERAL VERTIGO, UNSPECIFIED LATERALITY: ICD-10-CM

## 2017-09-27 DIAGNOSIS — R06.02 SHORTNESS OF BREATH: Primary | ICD-10-CM

## 2017-09-27 LAB
ALBUMIN SERPL-MCNC: 3.8 G/DL (ref 3.2–4.8)
ALBUMIN/GLOB SERPL: 1.5 G/DL (ref 1.5–2.5)
ALP SERPL-CCNC: 65 U/L (ref 25–100)
ALT SERPL W P-5'-P-CCNC: 31 U/L (ref 7–40)
ANION GAP SERPL CALCULATED.3IONS-SCNC: 5 MMOL/L (ref 3–11)
AST SERPL-CCNC: 34 U/L (ref 0–33)
BASOPHILS # BLD AUTO: 0.06 10*3/MM3 (ref 0–0.2)
BASOPHILS NFR BLD AUTO: 1 % (ref 0–1)
BILIRUB SERPL-MCNC: 1.4 MG/DL (ref 0.3–1.2)
BILIRUB UR QL STRIP: NEGATIVE
BNP SERPL-MCNC: 410 PG/ML (ref 0–100)
BUN BLD-MCNC: 31 MG/DL (ref 9–23)
BUN/CREAT SERPL: 22.1 (ref 7–25)
CALCIUM SPEC-SCNC: 9.1 MG/DL (ref 8.7–10.4)
CHLORIDE SERPL-SCNC: 107 MMOL/L (ref 99–109)
CLARITY UR: CLEAR
CO2 SERPL-SCNC: 24 MMOL/L (ref 20–31)
COLOR UR: YELLOW
CREAT BLD-MCNC: 1.4 MG/DL (ref 0.6–1.3)
DEPRECATED RDW RBC AUTO: 49 FL (ref 37–54)
EOSINOPHIL # BLD AUTO: 0.27 10*3/MM3 (ref 0–0.3)
EOSINOPHIL NFR BLD AUTO: 4.3 % (ref 0–3)
ERYTHROCYTE [DISTWIDTH] IN BLOOD BY AUTOMATED COUNT: 13.5 % (ref 11.3–14.5)
GFR SERPL CREATININE-BSD FRML MDRD: 36 ML/MIN/1.73
GLOBULIN UR ELPH-MCNC: 2.6 GM/DL
GLUCOSE BLD-MCNC: 119 MG/DL (ref 70–100)
GLUCOSE UR STRIP-MCNC: NEGATIVE MG/DL
HCT VFR BLD AUTO: 32.9 % (ref 34.5–44)
HGB BLD-MCNC: 10.9 G/DL (ref 11.5–15.5)
HGB UR QL STRIP.AUTO: NEGATIVE
HOLD SPECIMEN: NORMAL
HOLD SPECIMEN: NORMAL
IMM GRANULOCYTES # BLD: 0.01 10*3/MM3 (ref 0–0.03)
IMM GRANULOCYTES NFR BLD: 0.2 % (ref 0–0.6)
KETONES UR QL STRIP: NEGATIVE
LEUKOCYTE ESTERASE UR QL STRIP.AUTO: NEGATIVE
LYMPHOCYTES # BLD AUTO: 1.71 10*3/MM3 (ref 0.6–4.8)
LYMPHOCYTES NFR BLD AUTO: 27.3 % (ref 24–44)
MCH RBC QN AUTO: 33.1 PG (ref 27–31)
MCHC RBC AUTO-ENTMCNC: 33.1 G/DL (ref 32–36)
MCV RBC AUTO: 100 FL (ref 80–99)
MONOCYTES # BLD AUTO: 0.85 10*3/MM3 (ref 0–1)
MONOCYTES NFR BLD AUTO: 13.6 % (ref 0–12)
NEUTROPHILS # BLD AUTO: 3.37 10*3/MM3 (ref 1.5–8.3)
NEUTROPHILS NFR BLD AUTO: 53.6 % (ref 41–71)
NITRITE UR QL STRIP: NEGATIVE
PH UR STRIP.AUTO: 7 [PH] (ref 5–8)
PLATELET # BLD AUTO: 227 10*3/MM3 (ref 150–450)
PMV BLD AUTO: 10 FL (ref 6–12)
POTASSIUM BLD-SCNC: 3.9 MMOL/L (ref 3.5–5.5)
PROT SERPL-MCNC: 6.4 G/DL (ref 5.7–8.2)
PROT UR QL STRIP: NEGATIVE
RBC # BLD AUTO: 3.29 10*6/MM3 (ref 3.89–5.14)
SODIUM BLD-SCNC: 136 MMOL/L (ref 132–146)
SP GR UR STRIP: <=1.005 (ref 1–1.03)
TROPONIN I SERPL-MCNC: 0 NG/ML (ref 0–0.07)
TROPONIN I SERPL-MCNC: 0.01 NG/ML
UROBILINOGEN UR QL STRIP: NORMAL
WBC NRBC COR # BLD: 6.27 10*3/MM3 (ref 3.5–10.8)
WHOLE BLOOD HOLD SPECIMEN: NORMAL
WHOLE BLOOD HOLD SPECIMEN: NORMAL

## 2017-09-27 PROCEDURE — 84484 ASSAY OF TROPONIN QUANT: CPT

## 2017-09-27 PROCEDURE — 93005 ELECTROCARDIOGRAM TRACING: CPT | Performed by: EMERGENCY MEDICINE

## 2017-09-27 PROCEDURE — 96374 THER/PROPH/DIAG INJ IV PUSH: CPT

## 2017-09-27 PROCEDURE — 93005 ELECTROCARDIOGRAM TRACING: CPT

## 2017-09-27 PROCEDURE — 83880 ASSAY OF NATRIURETIC PEPTIDE: CPT | Performed by: EMERGENCY MEDICINE

## 2017-09-27 PROCEDURE — 25010000002 LORAZEPAM PER 2 MG: Performed by: EMERGENCY MEDICINE

## 2017-09-27 PROCEDURE — 85025 COMPLETE CBC W/AUTO DIFF WBC: CPT | Performed by: EMERGENCY MEDICINE

## 2017-09-27 PROCEDURE — 96375 TX/PRO/DX INJ NEW DRUG ADDON: CPT

## 2017-09-27 PROCEDURE — 84484 ASSAY OF TROPONIN QUANT: CPT | Performed by: EMERGENCY MEDICINE

## 2017-09-27 PROCEDURE — 80053 COMPREHEN METABOLIC PANEL: CPT | Performed by: EMERGENCY MEDICINE

## 2017-09-27 PROCEDURE — 71010 HC CHEST PA OR AP: CPT

## 2017-09-27 PROCEDURE — 25010000002 FUROSEMIDE PER 20 MG: Performed by: EMERGENCY MEDICINE

## 2017-09-27 PROCEDURE — 99284 EMERGENCY DEPT VISIT MOD MDM: CPT

## 2017-09-27 PROCEDURE — 81003 URINALYSIS AUTO W/O SCOPE: CPT | Performed by: EMERGENCY MEDICINE

## 2017-09-27 RX ORDER — SODIUM CHLORIDE 0.9 % (FLUSH) 0.9 %
10 SYRINGE (ML) INJECTION AS NEEDED
Status: DISCONTINUED | OUTPATIENT
Start: 2017-09-27 | End: 2017-09-27 | Stop reason: HOSPADM

## 2017-09-27 RX ORDER — LORAZEPAM 2 MG/ML
0.5 INJECTION INTRAMUSCULAR ONCE
Status: COMPLETED | OUTPATIENT
Start: 2017-09-27 | End: 2017-09-27

## 2017-09-27 RX ORDER — FUROSEMIDE 10 MG/ML
20 INJECTION INTRAMUSCULAR; INTRAVENOUS ONCE
Status: COMPLETED | OUTPATIENT
Start: 2017-09-27 | End: 2017-09-27

## 2017-09-27 RX ORDER — FUROSEMIDE 10 MG/ML
40 INJECTION INTRAMUSCULAR; INTRAVENOUS ONCE
Status: DISCONTINUED | OUTPATIENT
Start: 2017-09-27 | End: 2017-09-27

## 2017-09-27 RX ADMIN — FUROSEMIDE 20 MG: 10 INJECTION, SOLUTION INTRAMUSCULAR; INTRAVENOUS at 04:19

## 2017-09-27 RX ADMIN — LORAZEPAM 0.5 MG: 2 INJECTION INTRAMUSCULAR; INTRAVENOUS at 04:19

## 2017-10-11 ENCOUNTER — HOSPITAL ENCOUNTER (OUTPATIENT)
Dept: GENERAL RADIOLOGY | Facility: HOSPITAL | Age: 82
Discharge: HOME | End: 2017-10-11
Attending: INTERNAL MEDICINE | Admitting: INTERNAL MEDICINE

## 2017-10-11 ENCOUNTER — OFFICE VISIT (OUTPATIENT)
Dept: CARDIOLOGY | Facility: CLINIC | Age: 82
End: 2017-10-11

## 2017-10-11 VITALS
WEIGHT: 119.7 LBS | DIASTOLIC BLOOD PRESSURE: 78 MMHG | BODY MASS INDEX: 23.5 KG/M2 | HEART RATE: 78 BPM | HEIGHT: 60 IN | SYSTOLIC BLOOD PRESSURE: 138 MMHG

## 2017-10-11 DIAGNOSIS — I42.8 NON-ISCHEMIC CARDIOMYOPATHY (HCC): ICD-10-CM

## 2017-10-11 DIAGNOSIS — R06.02 SOB (SHORTNESS OF BREATH): Primary | ICD-10-CM

## 2017-10-11 DIAGNOSIS — I10 ESSENTIAL HYPERTENSION: Chronic | ICD-10-CM

## 2017-10-11 DIAGNOSIS — I44.7 BUNDLE BRANCH BLOCK, LEFT: Chronic | ICD-10-CM

## 2017-10-11 DIAGNOSIS — I50.21 ACUTE SYSTOLIC HEART FAILURE (HCC): ICD-10-CM

## 2017-10-11 DIAGNOSIS — R06.02 SOB (SHORTNESS OF BREATH): ICD-10-CM

## 2017-10-11 PROCEDURE — 71020 HC CHEST PA AND LATERAL: CPT

## 2017-10-11 PROCEDURE — 99214 OFFICE O/P EST MOD 30 MIN: CPT | Performed by: INTERNAL MEDICINE

## 2017-10-11 NOTE — PROGRESS NOTES
Subjective:     Encounter Date:10/11/2017      Patient ID: Sylvia Jalloh is a 83 y.o. female.    Chief Complaint: Hypertensive Emergency on chronic Essential hypertension      PROBLEM LIST:  1. Acute on chronic heart failure/ takutsubo cardiomyopathy  a. Echo 7/28/2017 showed EF 21-30%, BNP >1000 and bilateral effusions noted.  b. LHC 7/31/2017: Normal coronary arteries with low normal LVEF of 45-50%.  c. Recurrent heart failure 9/2017.  Echo LVEF 30%.  2. LBBB.  3. Hypertension  4. Left pleural effusion  5. Hypothyroidism   a. TSH elevated, normal T4, per hospitalist.  6. CKD stage 3, GFR 30-59 ml/min            History of Present Illness  Patient returns today for follow up with a history of dilated cardiomyopathy, presumed Takotsubo's.  Since hospital discharge one month ago when she was last seen, she had another merger visit for dyspnea treated with Lasix and discharged home.  Since that time she notes her shortness of breath is better, although she occasionally has to take an extra Lasix.  She notes with more diuretics her nausea is better.  Her cough is improved, but still has a dry nonproductive cough.  No exertional chest pain orthopnea presyncope or palpitations..     Allergies   Allergen Reactions   • Ace Inhibitors Rash   • Latex Rash         Current Outpatient Prescriptions:   •  carvedilol (COREG) 25 MG tablet, Take 1 tablet by mouth 2 (Two) Times a Day With Meals., Disp: 30 tablet, Rfl: 1  •  estradiol (ESTRACE) 1 MG tablet, Take 1 mg by mouth Daily. Taking 1/2 tablet qd, Disp: , Rfl:   •  levothyroxine (SYNTHROID, LEVOTHROID) 112 MCG tablet, Take 112 mcg by mouth Daily., Disp: , Rfl:   •  Multiple Vitamins-Minerals (MULTIVITAMIN ADULTS 50+) tablet, Take 1 tablet by mouth Daily., Disp: , Rfl:   •  sacubitril-valsartan (ENTRESTO) 24-26 MG tablet, Take 2 tablets by mouth Every 12 (Twelve) Hours. Indications: Heart Failure, Disp: 120 tablet, Rfl: 1  •  spironolactone (ALDACTONE) 25 MG tablet, Take  "1 tablet by mouth Daily., Disp: 30 tablet, Rfl: 1  •  Vitamin D, Cholecalciferol, 1000 UNITS capsule, Take 1,000 Units by mouth Daily., Disp: , Rfl:   •  furosemide (LASIX) 40 MG tablet, Take 1 tablet by mouth Daily. (Patient taking differently: Take 40 mg by mouth Daily. Taking 1/2 tablet qd), Disp: 30 tablet, Rfl: 11    The following portions of the patient's history were reviewed and updated as appropriate: allergies, current medications, past family history, past medical history, past social history, past surgical history and problem list.    Review of Systems   Constitution: Negative.   Cardiovascular: Negative.    Respiratory: Positive for cough.    Hematologic/Lymphatic: Negative for bleeding problem. Does not bruise/bleed easily.   Skin: Negative for rash.   Musculoskeletal: Negative for muscle weakness and myalgias.   Gastrointestinal: Negative for heartburn, nausea and vomiting.   Neurological: Negative.           Objective:   Blood pressure 138/78, pulse 78, height 60\" (152.4 cm), weight 119 lb 11.2 oz (54.3 kg).      Physical Exam   Constitutional: She is oriented to person, place, and time. She appears well-developed and well-nourished.   HENT:   Mouth/Throat: Oropharynx is clear and moist.   Neck: No JVD present. Carotid bruit is not present. No thyromegaly present.   Cardiovascular: Regular rhythm, S1 normal, S2 normal, normal heart sounds and intact distal pulses.  Exam reveals no gallop, no S3 and no S4.    No murmur heard.  Pulses:       Carotid pulses are 2+ on the right side, and 2+ on the left side.       Radial pulses are 2+ on the right side, and 2+ on the left side.   Pulmonary/Chest: She has decreased breath sounds in the left lower field.   Abdominal: Soft. Bowel sounds are normal. She exhibits no mass. There is no tenderness.   Musculoskeletal: She exhibits no edema.   Neurological: She is alert and oriented to person, place, and time.   Skin: Skin is warm and dry. No rash noted.       Lab " Review:    Procedures        Assessment:   Sylvia was seen today for hypertensive emergency on chronic essential hypertension.    Diagnoses and all orders for this visit:    SOB (shortness of breath)  -     XR chest pa and lateral; Future        Impression  1. 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  2. Heart failure, systolic, currently euvolemic, but recent hospital evaluations for heart failure  3. Functional class II-III  4. Left pleural effusion  5. Chronic left bundle branch block  6. Hypertension    Plan:  1. Continue high-dose Entresto and current diuretic regimen.  2. Chest chest x-ray to evaluate left effusion.  3. Now that she is on max medical therapy, will recheck echocardiogram prior to next visit, possible need for biventricular ICD  4. Revisit in 3 MO, or sooner as needed.    Thiago Vega MD

## 2018-02-21 ENCOUNTER — HOSPITAL ENCOUNTER (OUTPATIENT)
Dept: CARDIOLOGY | Facility: HOSPITAL | Age: 83
Discharge: HOME | End: 2018-02-21
Attending: INTERNAL MEDICINE | Admitting: INTERNAL MEDICINE

## 2018-02-21 VITALS — BODY MASS INDEX: 23.36 KG/M2 | HEIGHT: 60 IN | WEIGHT: 119 LBS

## 2018-02-21 DIAGNOSIS — I51.81 TAKOTSUBO CARDIOMYOPATHY: ICD-10-CM

## 2018-02-21 PROCEDURE — 93306 TTE W/DOPPLER COMPLETE: CPT | Performed by: INTERNAL MEDICINE

## 2018-02-21 PROCEDURE — 93306 TTE W/DOPPLER COMPLETE: CPT

## 2018-02-23 LAB
BH CV ECHO MEAS - AO MAX PG (FULL): 12.1 MMHG
BH CV ECHO MEAS - AO MAX PG: 17 MMHG
BH CV ECHO MEAS - AO MEAN PG (FULL): 7.4 MMHG
BH CV ECHO MEAS - AO MEAN PG: 9.8 MMHG
BH CV ECHO MEAS - AO ROOT AREA (BSA CORRECTED): 1.7
BH CV ECHO MEAS - AO ROOT AREA: 5.3 CM^2
BH CV ECHO MEAS - AO ROOT DIAM: 2.6 CM
BH CV ECHO MEAS - AO V2 MAX: 209.3 CM/SEC
BH CV ECHO MEAS - AO V2 MEAN: 145.5 CM/SEC
BH CV ECHO MEAS - AO V2 VTI: 51.7 CM
BH CV ECHO MEAS - AVA(I,A): 1.4 CM^2
BH CV ECHO MEAS - AVA(I,D): 1.4 CM^2
BH CV ECHO MEAS - AVA(V,A): 1.5 CM^2
BH CV ECHO MEAS - AVA(V,D): 1.5 CM^2
BH CV ECHO MEAS - BSA(HAYCOCK): 1.5 M^2
BH CV ECHO MEAS - BSA: 1.5 M^2
BH CV ECHO MEAS - BZI_BMI: 23.2 KILOGRAMS/M^2
BH CV ECHO MEAS - BZI_METRIC_HEIGHT: 152.4 CM
BH CV ECHO MEAS - BZI_METRIC_WEIGHT: 54 KG
BH CV ECHO MEAS - CONTRAST EF (2CH): 56.2 ML/M^2
BH CV ECHO MEAS - CONTRAST EF 4CH: 54.1 ML/M^2
BH CV ECHO MEAS - EDV(CUBED): 88.1 ML
BH CV ECHO MEAS - EDV(MOD-SP2): 73 ML
BH CV ECHO MEAS - EDV(MOD-SP4): 74 ML
BH CV ECHO MEAS - EDV(TEICH): 90.1 ML
BH CV ECHO MEAS - EF(CUBED): 58.1 %
BH CV ECHO MEAS - EF(MOD-SP2): 56.2 %
BH CV ECHO MEAS - EF(TEICH): 49.9 %
BH CV ECHO MEAS - ESV(CUBED): 36.9 ML
BH CV ECHO MEAS - ESV(MOD-SP2): 32 ML
BH CV ECHO MEAS - ESV(MOD-SP4): 34 ML
BH CV ECHO MEAS - ESV(TEICH): 45.1 ML
BH CV ECHO MEAS - FS: 25.2 %
BH CV ECHO MEAS - IVS/LVPW: 1
BH CV ECHO MEAS - IVSD: 1.3 CM
BH CV ECHO MEAS - LA DIMENSION: 3.6 CM
BH CV ECHO MEAS - LA/AO: 1.4
BH CV ECHO MEAS - LV DIASTOLIC VOL/BSA (35-75): 49.4 ML/M^2
BH CV ECHO MEAS - LV MASS(C)D: 207.8 GRAMS
BH CV ECHO MEAS - LV MASS(C)DI: 138.8 GRAMS/M^2
BH CV ECHO MEAS - LV MAX PG: 4.9 MMHG
BH CV ECHO MEAS - LV MEAN PG: 2.3 MMHG
BH CV ECHO MEAS - LV SYSTOLIC VOL/BSA (12-30): 22.7 ML/M^2
BH CV ECHO MEAS - LV V1 MAX: 110.3 CM/SEC
BH CV ECHO MEAS - LV V1 MEAN: 67.6 CM/SEC
BH CV ECHO MEAS - LV V1 VTI: 25.7 CM
BH CV ECHO MEAS - LVIDD: 4.5 CM
BH CV ECHO MEAS - LVIDS: 3.3 CM
BH CV ECHO MEAS - LVLD AP2: 6.6 CM
BH CV ECHO MEAS - LVLD AP4: 6.6 CM
BH CV ECHO MEAS - LVLS AP2: 5.8 CM
BH CV ECHO MEAS - LVLS AP4: 6 CM
BH CV ECHO MEAS - LVOT AREA (M): 2.8 CM^2
BH CV ECHO MEAS - LVOT AREA: 2.8 CM^2
BH CV ECHO MEAS - LVOT DIAM: 1.9 CM
BH CV ECHO MEAS - LVPWD: 1.3 CM
BH CV ECHO MEAS - MV A MAX VEL: 111 CM/SEC
BH CV ECHO MEAS - MV E MAX VEL: 99.6 CM/SEC
BH CV ECHO MEAS - MV E/A: 0.9
BH CV ECHO MEAS - PA ACC SLOPE: 613 CM/SEC^2
BH CV ECHO MEAS - PA ACC TIME: 0.12 SEC
BH CV ECHO MEAS - PA PR(ACCEL): 23.7 MMHG
BH CV ECHO MEAS - PI END-D VEL: 134 CM/SEC
BH CV ECHO MEAS - RAP SYSTOLE: 3 MMHG
BH CV ECHO MEAS - RVSP: 34 MMHG
BH CV ECHO MEAS - SI(AO): 183.2 ML/M^2
BH CV ECHO MEAS - SI(CUBED): 34.2 ML/M^2
BH CV ECHO MEAS - SI(LVOT): 48.6 ML/M^2
BH CV ECHO MEAS - SI(MOD-SP2): 27.4 ML/M^2
BH CV ECHO MEAS - SI(MOD-SP4): 26.7 ML/M^2
BH CV ECHO MEAS - SI(TEICH): 30 ML/M^2
BH CV ECHO MEAS - SV(AO): 274.2 ML
BH CV ECHO MEAS - SV(CUBED): 51.2 ML
BH CV ECHO MEAS - SV(LVOT): 72.8 ML
BH CV ECHO MEAS - SV(MOD-SP2): 41 ML
BH CV ECHO MEAS - SV(MOD-SP4): 40 ML
BH CV ECHO MEAS - SV(TEICH): 44.9 ML
BH CV ECHO MEAS - TAPSE (>1.6): 2.06 CM2
BH CV ECHO MEAS - TR MAX VEL: 277.5 CM/SEC
BH CV VAS BP RIGHT ARM: NORMAL MMHG
BH CV XLRA - RV BASE: 4.4 CM
BH CV XLRA - RV LENGTH: 6.2 CM
BH CV XLRA - RV MID: 2.6 CM
BH CV XLRA - TDI S': 14.7 CM/SEC
E/E' RATIO: 18.4
LEFT ATRIUM VOLUME INDEX: 42.7 ML/M2
LEFT ATRIUM VOLUME: 64 CM3
LV EF 2D ECHO EST: 40 %

## 2018-03-07 ENCOUNTER — OFFICE VISIT (OUTPATIENT)
Dept: CARDIOLOGY | Facility: CLINIC | Age: 83
End: 2018-03-07

## 2018-03-07 VITALS
SYSTOLIC BLOOD PRESSURE: 136 MMHG | DIASTOLIC BLOOD PRESSURE: 74 MMHG | HEIGHT: 60 IN | BODY MASS INDEX: 23.95 KG/M2 | WEIGHT: 122 LBS | HEART RATE: 66 BPM

## 2018-03-07 DIAGNOSIS — I10 ESSENTIAL HYPERTENSION: ICD-10-CM

## 2018-03-07 DIAGNOSIS — I42.8 OTHER CARDIOMYOPATHY (HCC): Primary | ICD-10-CM

## 2018-03-07 PROCEDURE — 99213 OFFICE O/P EST LOW 20 MIN: CPT | Performed by: INTERNAL MEDICINE

## 2018-03-07 NOTE — PROGRESS NOTES
"Subjective:     Encounter Date:03/07/2018      Patient ID: Sylvia Jalloh is a 83 y.o. female.    Chief Complaint: Hypertensive Emergency on chronic Essential hypertension and bundle branch block      PROBLEM LIST:  1. Acute on chronic heart failure/ takutsubo cardiomyopathy  a. Echo 7/28/2017 showed EF 21-30%, BNP >1000 and bilateral effusions noted.  b. LHC 7/31/2017: Normal coronary arteries with low normal LVEF of 45-50%.  c. Recurrent heart failure 9/2017.  Echo LVEF 30%.  d. Echo 2/18 LVEF 40%  2. LBBB.  3. Hypertension  4. Left pleural effusion  5. Hypothyroidism   a. TSH elevated, normal T4, per hospitalist.  6. CKD stage 3, GFR 30-59 ml/min    History of Present Illness  Patient returns today for follow up with a history ofChronic acute systolic and diastolic heart failure due to Takotsubo's cardiomyopathy.  Since her last visit she overall feels she is doing well.  She is persistent dyspnea on exertion which is unchanged.  She overall thinks she is doing better as does her friend.  She's not had any Hospital emergency room visit since her last visit.  She notes she occasionally has to take an extra Lasix about every 3 weeks for some orthopnea PND.  She is told to \"scared to sleep and anything other than a recliner however.  No chest pain or tachycardia palpitations..     Allergies   Allergen Reactions   • Ace Inhibitors Rash   • Latex Rash         Current Outpatient Prescriptions:   •  carvedilol (COREG) 25 MG tablet, Take 1 tablet by mouth 2 (Two) Times a Day With Meals., Disp: 30 tablet, Rfl: 1  •  estradiol (ESTRACE) 1 MG tablet, Take 1 mg by mouth Daily. Taking 1/2 tablet qd, Disp: , Rfl:   •  furosemide (LASIX) 40 MG tablet, Take 1 tablet by mouth Daily., Disp: 30 tablet, Rfl: 11  •  levothyroxine (SYNTHROID, LEVOTHROID) 112 MCG tablet, Take 112 mcg by mouth Daily., Disp: , Rfl:   •  Multiple Vitamins-Minerals (MULTIVITAMIN ADULTS 50+) tablet, Take 1 tablet by mouth Daily., Disp: , Rfl:   •  " "sacubitril-valsartan (ENTRESTO) 49-51 MG tablet, Take 1 tablet by mouth 2 (Two) Times a Day., Disp: 60 tablet, Rfl: 11  •  spironolactone (ALDACTONE) 25 MG tablet, Take 1 tablet by mouth Daily., Disp: 30 tablet, Rfl: 1  •  Vitamin D, Cholecalciferol, 1000 UNITS capsule, Take 1,000 Units by mouth Daily., Disp: , Rfl:     The following portions of the patient's history were reviewed and updated as appropriate: allergies, current medications, past family history, past medical history, past social history, past surgical history and problem list.    Review of Systems   Constitution: Negative.   Cardiovascular: Negative.    Respiratory: Positive for cough.    Hematologic/Lymphatic: Negative for bleeding problem. Does not bruise/bleed easily.   Skin: Negative for rash.   Musculoskeletal: Negative for muscle weakness and myalgias.   Gastrointestinal: Negative for heartburn, nausea and vomiting.   Neurological: Negative.           Objective:   Blood pressure 136/74, pulse 66, height 152.4 cm (60\"), weight 55.3 kg (122 lb).      Physical Exam   Constitutional: She is oriented to person, place, and time. She appears well-developed and well-nourished.   HENT:   Mouth/Throat: Oropharynx is clear and moist.   Neck: No JVD present. Carotid bruit is not present. No thyromegaly present.   Cardiovascular: Regular rhythm, S1 normal, S2 normal, normal heart sounds and intact distal pulses.  Exam reveals no gallop, no S3 and no S4.    No murmur heard.  Pulses:       Carotid pulses are 2+ on the right side, and 2+ on the left side.       Radial pulses are 2+ on the right side, and 2+ on the left side.   Pulmonary/Chest: Breath sounds normal.   Abdominal: Soft. Bowel sounds are normal. She exhibits no mass. There is no tenderness.   Musculoskeletal: She exhibits no edema.   Neurological: She is alert and oriented to person, place, and time.   Skin: Skin is warm and dry. No rash noted.       Lab Review:    Procedures        Assessment: "   Sylvia was seen today for hypertensive emergency on chronic essential hypertension and bundle branch block.    Diagnoses and all orders for this visit:    Other cardiomyopathy    Essential hypertension      Impression  1. Nonischemic, stress-induced card myopathy, though persistent.  Last LVEF 40%.  Currently not in heart failure.  2. Hypertension controlled  3. Chronic left bundle-branch block    Plan:  1. Patient is doing well continue current medical therapy.  2. Okay to take when necessary Lasix as needed in addition to her standing.  3. Revisit in 6 MO, or sooner as needed.    Thiago Vega MD

## 2018-08-02 RX ORDER — FUROSEMIDE 40 MG/1
40 TABLET ORAL DAILY
Qty: 30 TABLET | OUTPATIENT
Start: 2018-08-02

## 2018-08-31 ENCOUNTER — HOSPITAL ENCOUNTER (EMERGENCY)
Facility: HOSPITAL | Age: 83
Discharge: HOME | End: 2018-08-31
Attending: EMERGENCY MEDICINE | Admitting: EMERGENCY MEDICINE

## 2018-08-31 ENCOUNTER — APPOINTMENT (OUTPATIENT)
Dept: GENERAL RADIOLOGY | Facility: HOSPITAL | Age: 83
End: 2018-08-31

## 2018-08-31 VITALS
DIASTOLIC BLOOD PRESSURE: 63 MMHG | WEIGHT: 122 LBS | HEIGHT: 61 IN | HEART RATE: 58 BPM | SYSTOLIC BLOOD PRESSURE: 163 MMHG | RESPIRATION RATE: 18 BRPM | OXYGEN SATURATION: 98 % | BODY MASS INDEX: 23.03 KG/M2 | TEMPERATURE: 98.4 F

## 2018-08-31 DIAGNOSIS — I50.23 ACUTE ON CHRONIC SYSTOLIC CHF (CONGESTIVE HEART FAILURE) (HCC): Primary | ICD-10-CM

## 2018-08-31 DIAGNOSIS — N18.9 CHRONIC KIDNEY DISEASE, UNSPECIFIED CKD STAGE: ICD-10-CM

## 2018-08-31 LAB
ALBUMIN SERPL-MCNC: 4.68 G/DL (ref 3.2–4.8)
ALBUMIN/GLOB SERPL: 1.9 G/DL (ref 1.5–2.5)
ALP SERPL-CCNC: 84 U/L (ref 25–100)
ALT SERPL W P-5'-P-CCNC: 16 U/L (ref 7–40)
ANION GAP SERPL CALCULATED.3IONS-SCNC: 8 MMOL/L (ref 3–11)
AST SERPL-CCNC: 24 U/L (ref 0–33)
BASOPHILS # BLD AUTO: 0.06 10*3/MM3 (ref 0–0.2)
BASOPHILS NFR BLD AUTO: 0.7 % (ref 0–1)
BILIRUB SERPL-MCNC: 1.6 MG/DL (ref 0.3–1.2)
BNP SERPL-MCNC: 293 PG/ML (ref 0–100)
BUN BLD-MCNC: 34 MG/DL (ref 9–23)
BUN/CREAT SERPL: 17.4 (ref 7–25)
CALCIUM SPEC-SCNC: 9.8 MG/DL (ref 8.7–10.4)
CHLORIDE SERPL-SCNC: 104 MMOL/L (ref 99–109)
CO2 SERPL-SCNC: 24 MMOL/L (ref 20–31)
CREAT BLD-MCNC: 1.95 MG/DL (ref 0.6–1.3)
DEPRECATED RDW RBC AUTO: 48.8 FL (ref 37–54)
EOSINOPHIL # BLD AUTO: 0.17 10*3/MM3 (ref 0–0.3)
EOSINOPHIL NFR BLD AUTO: 2 % (ref 0–3)
ERYTHROCYTE [DISTWIDTH] IN BLOOD BY AUTOMATED COUNT: 13.4 % (ref 11.3–14.5)
GFR SERPL CREATININE-BSD FRML MDRD: 24 ML/MIN/1.73
GLOBULIN UR ELPH-MCNC: 2.5 GM/DL
GLUCOSE BLD-MCNC: 108 MG/DL (ref 70–100)
HCT VFR BLD AUTO: 33.3 % (ref 34.5–44)
HGB BLD-MCNC: 11.1 G/DL (ref 11.5–15.5)
HOLD SPECIMEN: NORMAL
HOLD SPECIMEN: NORMAL
IMM GRANULOCYTES # BLD: 0.02 10*3/MM3 (ref 0–0.03)
IMM GRANULOCYTES NFR BLD: 0.2 % (ref 0–0.6)
LIPASE SERPL-CCNC: 67 U/L (ref 6–51)
LYMPHOCYTES # BLD AUTO: 1.18 10*3/MM3 (ref 0.6–4.8)
LYMPHOCYTES NFR BLD AUTO: 13.7 % (ref 24–44)
MCH RBC QN AUTO: 33 PG (ref 27–31)
MCHC RBC AUTO-ENTMCNC: 33.3 G/DL (ref 32–36)
MCV RBC AUTO: 99.1 FL (ref 80–99)
MONOCYTES # BLD AUTO: 0.9 10*3/MM3 (ref 0–1)
MONOCYTES NFR BLD AUTO: 10.5 % (ref 0–12)
NEUTROPHILS # BLD AUTO: 6.28 10*3/MM3 (ref 1.5–8.3)
NEUTROPHILS NFR BLD AUTO: 73.1 % (ref 41–71)
PLATELET # BLD AUTO: 202 10*3/MM3 (ref 150–450)
PMV BLD AUTO: 10.7 FL (ref 6–12)
POTASSIUM BLD-SCNC: 4.5 MMOL/L (ref 3.5–5.5)
PROT SERPL-MCNC: 7.2 G/DL (ref 5.7–8.2)
RBC # BLD AUTO: 3.36 10*6/MM3 (ref 3.89–5.14)
SODIUM BLD-SCNC: 136 MMOL/L (ref 132–146)
TROPONIN I SERPL-MCNC: 0 NG/ML (ref 0–0.07)
WBC NRBC COR # BLD: 8.59 10*3/MM3 (ref 3.5–10.8)
WHOLE BLOOD HOLD SPECIMEN: NORMAL
WHOLE BLOOD HOLD SPECIMEN: NORMAL

## 2018-08-31 PROCEDURE — 99284 EMERGENCY DEPT VISIT MOD MDM: CPT

## 2018-08-31 PROCEDURE — 85025 COMPLETE CBC W/AUTO DIFF WBC: CPT | Performed by: EMERGENCY MEDICINE

## 2018-08-31 PROCEDURE — 83690 ASSAY OF LIPASE: CPT | Performed by: EMERGENCY MEDICINE

## 2018-08-31 PROCEDURE — 71045 X-RAY EXAM CHEST 1 VIEW: CPT

## 2018-08-31 PROCEDURE — 80053 COMPREHEN METABOLIC PANEL: CPT | Performed by: EMERGENCY MEDICINE

## 2018-08-31 PROCEDURE — 93005 ELECTROCARDIOGRAM TRACING: CPT

## 2018-08-31 PROCEDURE — 96374 THER/PROPH/DIAG INJ IV PUSH: CPT

## 2018-08-31 PROCEDURE — 25010000002 FUROSEMIDE PER 20 MG: Performed by: EMERGENCY MEDICINE

## 2018-08-31 PROCEDURE — 93005 ELECTROCARDIOGRAM TRACING: CPT | Performed by: EMERGENCY MEDICINE

## 2018-08-31 PROCEDURE — 84484 ASSAY OF TROPONIN QUANT: CPT

## 2018-08-31 PROCEDURE — 83880 ASSAY OF NATRIURETIC PEPTIDE: CPT | Performed by: EMERGENCY MEDICINE

## 2018-08-31 RX ORDER — ASPIRIN 81 MG/1
TABLET, CHEWABLE ORAL
Status: COMPLETED
Start: 2018-08-31 | End: 2018-08-31

## 2018-08-31 RX ORDER — FUROSEMIDE 10 MG/ML
40 INJECTION INTRAMUSCULAR; INTRAVENOUS ONCE
Status: COMPLETED | OUTPATIENT
Start: 2018-08-31 | End: 2018-08-31

## 2018-08-31 RX ORDER — ASPIRIN 81 MG/1
324 TABLET, CHEWABLE ORAL ONCE
Status: COMPLETED | OUTPATIENT
Start: 2018-08-31 | End: 2018-08-31

## 2018-08-31 RX ORDER — SODIUM CHLORIDE 0.9 % (FLUSH) 0.9 %
10 SYRINGE (ML) INJECTION AS NEEDED
Status: DISCONTINUED | OUTPATIENT
Start: 2018-08-31 | End: 2018-08-31 | Stop reason: HOSPADM

## 2018-08-31 RX ADMIN — FUROSEMIDE 40 MG: 10 INJECTION, SOLUTION INTRAMUSCULAR; INTRAVENOUS at 08:16

## 2018-08-31 RX ADMIN — ASPIRIN 324 MG: 81 TABLET, CHEWABLE ORAL at 05:40

## 2018-08-31 RX ADMIN — ASPIRIN 81 MG 324 MG: 81 TABLET ORAL at 05:40

## 2018-09-05 ENCOUNTER — OFFICE VISIT (OUTPATIENT)
Dept: CARDIOLOGY | Facility: HOSPITAL | Age: 83
End: 2018-09-05

## 2018-09-05 VITALS
HEIGHT: 61 IN | BODY MASS INDEX: 23.64 KG/M2 | WEIGHT: 125.2 LBS | RESPIRATION RATE: 16 BRPM | TEMPERATURE: 97.6 F | SYSTOLIC BLOOD PRESSURE: 139 MMHG | HEART RATE: 63 BPM | DIASTOLIC BLOOD PRESSURE: 63 MMHG | OXYGEN SATURATION: 99 %

## 2018-09-05 DIAGNOSIS — N18.30 CKD (CHRONIC KIDNEY DISEASE) STAGE 3, GFR 30-59 ML/MIN (HCC): Chronic | ICD-10-CM

## 2018-09-05 DIAGNOSIS — J44.9 CHRONIC OBSTRUCTIVE PULMONARY DISEASE, UNSPECIFIED COPD TYPE (HCC): ICD-10-CM

## 2018-09-05 DIAGNOSIS — I50.21 ACUTE SYSTOLIC HEART FAILURE (HCC): ICD-10-CM

## 2018-09-05 DIAGNOSIS — I42.8 NON-ISCHEMIC CARDIOMYOPATHY (HCC): Primary | ICD-10-CM

## 2018-09-05 DIAGNOSIS — I10 ESSENTIAL HYPERTENSION: ICD-10-CM

## 2018-09-05 PROCEDURE — 99214 OFFICE O/P EST MOD 30 MIN: CPT | Performed by: NURSE PRACTITIONER

## 2018-09-05 NOTE — PROGRESS NOTES
[facility]  Heart and Valve Center      Encounter Date:09/05/2018     Sylvia Jalloh  [address] [place] [address]  [phone]    1934    Eddie Flanagan MD    Sylvia Jalloh is a 84 y.o. female.      Subjective:     Chief Complaint:  Congestive Heart Failure       HPI     84-year-old female presented to [facility] ED on 8/31/18 with complaints of dyspnea, dizziness, nausea, chest tightness.  Patient has history of Takosubo cardiomyopathy.  Symptoms had worsened over the week with a 5 pound weight gain.  Patient instructed to increase her Lasix for a few days and follow-up in the heart and valve Center    Pt states dyspnea has improved.  Weight back to baseline.  Pt reports intermittent dyspnea, chest tightness.  XR showed COPD, not currently treated.  Pt reports intermittent post nasal drip/congestion, productive cough, white sputum.  Pt denies dizziness, near syncope/syncope, edema.    Patient Active Problem List    Diagnosis   • Acute hypoxemic respiratory failure (CMS/HCC) [J96.01]   • Non-ischemic cardiomyopathy (CMS/HCC) [I42.8]     Overview Note:     · Echo (09/06/2017): LVEF 35%.  · Echo (07/28/2017): LVEF 21 - 30%. Hypokinesis of the septal and anterior segments. Grade II diastolic dysfunction. Mild AS. Moderate MR. Mild CO and TR. RVSP= 45 mmHg  · Cardiac cath (07/28/2017): Normal coronary arteries. LVEF 45-50%  · Echo 9-22-17 EF = 30%. Mild mitral valve regurgitation.There is a large size left pleural effusion.  · Echocardiogram 2/18 ([place]): EF 40%     • Orthopnea [R06.01]   • Hypertensive Emergency on chronic Essential hypertension [I10]   • Hypothyroidism [E03.9]   • Bundle branch block, left [I44.7]     Overview Note:     · Known, not new.  Cardiomyopathy likely due to chronic LBBB with dyssynchronization of right and left ventricles.       • CKD (chronic kidney disease) stage 3, GFR 30-59 ml/min [N18.3]   • Acute systolic heart failure (CMS/HCC)  [I50.21]     Overview Note:     · NYHA Class III-IV           Past Surgical History:   Procedure Laterality Date   • APPENDECTOMY     • BLADDER SUSPENSION     • CARDIAC CATHETERIZATION N/A 7/29/2017    Procedure: Coronary angiography;  Surgeon: Tihago Vega MD;  Location: [facility] INVASIVE LOCATION;  Service:    • CHOLECYSTECTOMY     • HYSTERECTOMY         Allergies   Allergen Reactions   • Ace Inhibitors Rash   • Latex Rash         Current Outpatient Prescriptions:   •  carvedilol (COREG) 25 MG tablet, Take 1 tablet by mouth 2 (Two) Times a Day With Meals., Disp: 30 tablet, Rfl: 1  •  estradiol (ESTRACE) 1 MG tablet, Take 1 mg by mouth Daily. Taking 1/2 tablet qd, Disp: , Rfl:   •  furosemide (LASIX) 40 MG tablet, Take 1 tablet by mouth Daily., Disp: 30 tablet, Rfl: 11  •  levothyroxine (SYNTHROID, LEVOTHROID) 112 MCG tablet, Take 112 mcg by mouth Daily., Disp: , Rfl:   •  Multiple Vitamins-Minerals (MULTIVITAMIN ADULTS 50+) tablet, Take 1 tablet by mouth Daily., Disp: , Rfl:   •  sacubitril-valsartan (ENTRESTO) 49-51 MG tablet, Take 1 tablet by mouth 2 (Two) Times a Day., Disp: 60 tablet, Rfl: 11  •  spironolactone (ALDACTONE) 25 MG tablet, Take 1 tablet by mouth Daily., Disp: 30 tablet, Rfl: 1  •  Vitamin D, Cholecalciferol, 1000 UNITS capsule, Take 1,000 Units by mouth Daily., Disp: , Rfl:     The following portions of the patient's history were reviewed and updated as appropriate: allergies, current medications, past family history, past medical history, past social history, past surgical history and problem list.    Review of Systems   Constitution: Positive for weakness.   Respiratory: Positive for shortness of breath.    Gastrointestinal: Positive for nausea.   Neurological: Positive for dizziness.   All other systems reviewed and are negative.      Objective:     Vitals:    09/05/18 1230 09/05/18 1232 09/05/18 1233   BP: 147/67 128/60 139/63   BP Location: Right arm Left arm Left arm   Patient  "Position: Sitting Sitting Standing   Pulse: 61  63   Resp: 16     Temp: 97.6 °F (36.4 °C)     TempSrc: Temporal Artery      SpO2: 99%     Weight: 56.8 kg (125 lb 3.2 oz)     Height: 154.9 cm (61\")           Physical Exam   Constitutional: She is oriented to person, place, and time. She appears well-developed and well-nourished.   HENT:   Mouth/Throat: Oropharynx is clear and moist.   Neck: No JVD present. Carotid bruit is not present. No thyromegaly present.   Cardiovascular: Regular rhythm, S1 normal, S2 normal, normal heart sounds and intact distal pulses.  Exam reveals no gallop, no S3 and no S4.    No murmur heard.  Pulses:       Carotid pulses are 2+ on the right side, and 2+ on the left side.       Radial pulses are 2+ on the right side, and 2+ on the left side.   Pulmonary/Chest: Breath sounds normal.   Abdominal: Soft. Bowel sounds are normal. She exhibits no mass. There is no tenderness.   Musculoskeletal: She exhibits no edema.   Neurological: She is alert and oriented to person, place, and time.   Skin: Skin is warm and dry. No rash noted.       Lab and Diagnostic Review:  Admission on 08/31/2018, Discharged on 08/31/2018   Component Date Value Ref Range Status   • Glucose 08/31/2018 108* 70 - 100 mg/dL Final   • BUN 08/31/2018 34* 9 - 23 mg/dL Final   • Creatinine 08/31/2018 1.95* 0.60 - 1.30 mg/dL Final   • Sodium 08/31/2018 136  132 - 146 mmol/L Final   • Potassium 08/31/2018 4.5  3.5 - 5.5 mmol/L Final   • Chloride 08/31/2018 104  99 - 109 mmol/L Final   • CO2 08/31/2018 24.0  20.0 - 31.0 mmol/L Final   • Calcium 08/31/2018 9.8  8.7 - 10.4 mg/dL Final   • Total Protein 08/31/2018 7.2  5.7 - 8.2 g/dL Final   • Albumin 08/31/2018 4.68  3.20 - 4.80 g/dL Final   • ALT (SGPT) 08/31/2018 16  7 - 40 U/L Final   • AST (SGOT) 08/31/2018 24  0 - 33 U/L Final   • Alkaline Phosphatase 08/31/2018 84  25 - 100 U/L Final   • Total Bilirubin 08/31/2018 1.6* 0.3 - 1.2 mg/dL Final   • eGFR Non  Amer 08/31/2018 " 24* >60 mL/min/1.73 Final   • Globulin 08/31/2018 2.5  gm/dL Final   • A/G Ratio 08/31/2018 1.9  1.5 - 2.5 g/dL Final   • BUN/Creatinine Ratio 08/31/2018 17.4  7.0 - 25.0 Final   • Anion Gap 08/31/2018 8.0  3.0 - 11.0 mmol/L Final   • Lipase 08/31/2018 67* 6 - 51 U/L Final   • BNP 08/31/2018 293.0* 0.0 - 100.0 pg/mL Final    Results may be falsely decreased if patient taking Biotin.   • Extra Tube 08/31/2018 hold for add-on   Final    Auto resulted   • Extra Tube 08/31/2018 Hold for add-ons.   Final    Auto resulted.   • Extra Tube 08/31/2018 hold for add-on   Final    Auto resulted   • Extra Tube 08/31/2018 Hold for add-ons.   Final    Auto resulted.   • WBC 08/31/2018 8.59  3.50 - 10.80 10*3/mm3 Final   • RBC 08/31/2018 3.36* 3.89 - 5.14 10*6/mm3 Final   • Hemoglobin 08/31/2018 11.1* 11.5 - 15.5 g/dL Final   • Hematocrit 08/31/2018 33.3* 34.5 - 44.0 % Final   • MCV 08/31/2018 99.1* 80.0 - 99.0 fL Final   • MCH 08/31/2018 33.0* 27.0 - 31.0 pg Final   • MCHC 08/31/2018 33.3  32.0 - 36.0 g/dL Final   • RDW 08/31/2018 13.4  11.3 - 14.5 % Final   • RDW-SD 08/31/2018 48.8  37.0 - 54.0 fl Final   • MPV 08/31/2018 10.7  6.0 - 12.0 fL Final   • Platelets 08/31/2018 202  150 - 450 10*3/mm3 Final   • Neutrophil % 08/31/2018 73.1* 41.0 - 71.0 % Final   • Lymphocyte % 08/31/2018 13.7* 24.0 - 44.0 % Final   • Monocyte % 08/31/2018 10.5  0.0 - 12.0 % Final   • Eosinophil % 08/31/2018 2.0  0.0 - 3.0 % Final   • Basophil % 08/31/2018 0.7  0.0 - 1.0 % Final   • Immature Grans % 08/31/2018 0.2  0.0 - 0.6 % Final   • Neutrophils, Absolute 08/31/2018 6.28  1.50 - 8.30 10*3/mm3 Final   • Lymphocytes, Absolute 08/31/2018 1.18  0.60 - 4.80 10*3/mm3 Final   • Monocytes, Absolute 08/31/2018 0.90  0.00 - 1.00 10*3/mm3 Final   • Eosinophils, Absolute 08/31/2018 0.17  0.00 - 0.30 10*3/mm3 Final   • Basophils, Absolute 08/31/2018 0.06  0.00 - 0.20 10*3/mm3 Final   • Immature Grans, Absolute 08/31/2018 0.02  0.00 - 0.03 10*3/mm3 Final   •  "Troponin I 08/31/2018 0.00  0.00 - 0.07 ng/mL Final    Serial Number: [id number]:  [id number]     EXAM:    XR Chest, 1 View     CLINICAL HISTORY:    84 years old, female; Pain; Chest pressure; Patient HX: Chest pain triage   protocol- patient states that \"she has chf and is having difficulty breathing,   chest tightness. \" HX: Chf, HTN     TECHNIQUE:    Frontal view of the chest.     COMPARISON:    CR - XR CHEST PA AND LATERAL 10/11/2017 2:34 PM     FINDINGS:    Lungs:  The lungs are hyperinflated with changes of COPD.    Pleural space:  Unremarkable.  No pneumothorax.    Heart:  Unremarkable.  No cardiomegaly.    Mediastinum:  Unremarkable.    Bones/joints:  There is scoliosis with degenerative changes of the spine.     IMPRESSION:    COPD.     THIS DOCUMENT HAS BEEN ELECTRONICALLY SIGNED BY ALCON NERI MD  Assessment and Plan:         1. Non-ischemic cardiomyopathy (CMS/HCC)  EF 40% 02/2018  Enstresto, coreg Lasix    2. Acute systolic heart failure (CMS/HCC)  Improved with extra Lasix for 3 days.  Continue Lasix 40 mg daily  Reviewed signs and symptoms of heart failure worsening and when tocall.  Role [facility].    3. Essential hypertension  Continue to monitor      4. CKD (chronic kidney disease) stage 3, GFR 30-59 ml/min  Worsening creatine.  Pt refused repeat labs today.  Plans to repeat with PCP next week    5.  COPD per CXR  No hx of PFTs or inhaler use.  Discussed PFTs, Pt redeclined wanting to discuss with PCP    Will contact [facility] to schedule f/u with Dr. Vega in [place].  Pt f/u [facility] as needed or as determined by cardiology        *Please note that portions of this note were completed with a voice recognition program. Efforts were made to edit the dictations, but occasionally words are mistranscribed.    "

## 2018-09-19 ENCOUNTER — OFFICE VISIT (OUTPATIENT)
Dept: CARDIOLOGY | Facility: CLINIC | Age: 83
End: 2018-09-19

## 2018-09-19 VITALS
HEIGHT: 60 IN | DIASTOLIC BLOOD PRESSURE: 66 MMHG | BODY MASS INDEX: 24.54 KG/M2 | SYSTOLIC BLOOD PRESSURE: 124 MMHG | WEIGHT: 125 LBS | HEART RATE: 65 BPM

## 2018-09-19 DIAGNOSIS — I42.8 NON-ISCHEMIC CARDIOMYOPATHY (HCC): Primary | ICD-10-CM

## 2018-09-19 DIAGNOSIS — N18.30 CKD (CHRONIC KIDNEY DISEASE) STAGE 3, GFR 30-59 ML/MIN (HCC): Chronic | ICD-10-CM

## 2018-09-19 DIAGNOSIS — I10 ESSENTIAL HYPERTENSION: ICD-10-CM

## 2018-09-19 PROCEDURE — 99214 OFFICE O/P EST MOD 30 MIN: CPT | Performed by: INTERNAL MEDICINE

## 2018-09-19 NOTE — PROGRESS NOTES
Subjective:     Encounter Date:09/19/2018      Patient ID: Sylvia Jalloh is a 84 y.o. female.    Chief Complaint: Hypertensive Emergency on chronic Essential hypertension; Bundle branch block, left; and Acute systolic heart failure      PROBLEM LIST:  1. Acute on chronic heart failure/ takutsubo cardiomyopathy  a. Echo 7/28/2017 showed EF 21-30%, BNP >1000 and bilateral effusions noted.  b. LHC 7/31/2017: Normal coronary arteries with low normal LVEF of 45-50%.  c. Recurrent heart failure 9/2017.  Echo LVEF 30%.  d. Echo 2/18 LVEF 40%  2. LBBB.  3. Hypertension  4. Left pleural effusion  5. Hypothyroidism   a. TSH elevated, normal T4, per hospitalist.  6. CKD stage 3, GFR 30-59 ml/min    History of Present Illness  Patient returns today for follow up with a history of Nonischemic cardiomyopathy for hospital follow-up from heart failure.  Patient on 8/31 had acute heart failure treated in the emergency room, and subsequently the heart and valve center with increasing when necessary Lasix.  Over the last month, she overall feels back to her normal self.  She has not taken extra Lasix pill in nearly 3 weeks.  She denies current orthopnea PND or chest pain.  She had some hypotension occurring in the evening, which is significantly improved with half a dose of Coreg in the evening.     Allergies   Allergen Reactions   • Ace Inhibitors Rash   • Latex Rash         Current Outpatient Prescriptions:   •  carvedilol (COREG) 25 MG tablet, Take 1 tablet by mouth 2 (Two) Times a Day With Meals. (Patient taking differently: Take 25 mg by mouth 2 (Two) Times a Day With Meals. 1 in am, 1/2 in pm), Disp: 30 tablet, Rfl: 1  •  estradiol (ESTRACE) 1 MG tablet, Take 1 mg by mouth Daily. Taking 1/2 tablet qd, Disp: , Rfl:   •  furosemide (LASIX) 40 MG tablet, Take 1 tablet by mouth Daily., Disp: 30 tablet, Rfl: 11  •  levothyroxine (SYNTHROID, LEVOTHROID) 112 MCG tablet, Take 112 mcg by mouth Daily., Disp: , Rfl:   •  Multiple  "Vitamins-Minerals (MULTIVITAMIN ADULTS 50+) tablet, Take 1 tablet by mouth Daily., Disp: , Rfl:   •  sacubitril-valsartan (ENTRESTO) 49-51 MG tablet, Take 1 tablet by mouth 2 (Two) Times a Day., Disp: 60 tablet, Rfl: 11  •  spironolactone (ALDACTONE) 25 MG tablet, Take 1 tablet by mouth Daily., Disp: 30 tablet, Rfl: 1  •  Vitamin D, Cholecalciferol, 1000 UNITS capsule, Take 1,000 Units by mouth Daily., Disp: , Rfl:     The following portions of the patient's history were reviewed and updated as appropriate: allergies, current medications, past family history, past medical history, past social history, past surgical history and problem list.    Review of Systems   Constitution: Negative.   Cardiovascular: Negative.    Respiratory: Positive for cough.    Hematologic/Lymphatic: Negative for bleeding problem. Does not bruise/bleed easily.   Skin: Negative for rash.   Musculoskeletal: Negative for muscle weakness and myalgias.   Gastrointestinal: Negative for heartburn, nausea and vomiting.   Genitourinary: Positive for bladder incontinence and frequency.   Neurological: Negative.           Objective:   Blood pressure 124/66, pulse 65, height 152.4 cm (60\"), weight 56.7 kg (125 lb).      Physical Exam   Constitutional: She is oriented to person, place, and time. She appears well-developed and well-nourished.   HENT:   Mouth/Throat: Oropharynx is clear and moist.   Neck: No JVD present. Carotid bruit is not present. No thyromegaly present.   Cardiovascular: Regular rhythm, S1 normal, S2 normal, normal heart sounds and intact distal pulses.  Exam reveals no gallop, no S3 and no S4.    No murmur heard.  Pulses:       Carotid pulses are 2+ on the right side, and 2+ on the left side.       Radial pulses are 2+ on the right side, and 2+ on the left side.   Pulmonary/Chest: She has rales.   Abdominal: Soft. Bowel sounds are normal. She exhibits no mass. There is no tenderness.   Musculoskeletal: She exhibits no edema. "   Neurological: She is alert and oriented to person, place, and time.   Skin: Skin is warm and dry. No rash noted.       Lab Review:    Procedures        Assessment:   Sylvia was seen today for hypertensive emergency on chronic essential hypertension, bundle branch block, left and acute systolic heart failure.    Diagnoses and all orders for this visit:    Non-ischemic cardiomyopathy (CMS/HCC)    Essential hypertension    CKD (chronic kidney disease) stage 3, GFR 30-59 ml/min        Impression  1. Nonischemic cardio myopathy.  Recent heart failure exacerbation.  Mildly volume overloaded today.  2. Hypertension, controlled currently.  Nocturnal hypotension is much better taking one half pill Coreg    Plan:  1. A take extra furosemide tonight, otherwise continue current medical therapy with when necessary furosemide (has not needed one in 3 weeks).  2. Revisit in 6 MO, or sooner as needed.    Thiago Vega MD

## 2018-10-18 RX ORDER — SACUBITRIL AND VALSARTAN 49; 51 MG/1; MG/1
1 TABLET, FILM COATED ORAL
Qty: 60 TABLET | Refills: 8 | Status: SHIPPED | OUTPATIENT
Start: 2018-10-18 | End: 2019-07-05 | Stop reason: SDUPTHER

## 2018-11-25 ENCOUNTER — APPOINTMENT (OUTPATIENT)
Dept: GENERAL RADIOLOGY | Facility: HOSPITAL | Age: 83
End: 2018-11-25

## 2018-11-25 ENCOUNTER — HOSPITAL ENCOUNTER (EMERGENCY)
Facility: HOSPITAL | Age: 83
Discharge: HOME | End: 2018-11-25
Attending: EMERGENCY MEDICINE | Admitting: EMERGENCY MEDICINE

## 2018-11-25 VITALS
DIASTOLIC BLOOD PRESSURE: 53 MMHG | HEIGHT: 60 IN | BODY MASS INDEX: 23.56 KG/M2 | OXYGEN SATURATION: 99 % | HEART RATE: 68 BPM | SYSTOLIC BLOOD PRESSURE: 143 MMHG | WEIGHT: 120 LBS | RESPIRATION RATE: 16 BRPM | TEMPERATURE: 98 F

## 2018-11-25 DIAGNOSIS — I10 ELEVATED BLOOD PRESSURE READING WITH DIAGNOSIS OF HYPERTENSION: ICD-10-CM

## 2018-11-25 DIAGNOSIS — E86.9 VOLUME DEPLETION: ICD-10-CM

## 2018-11-25 DIAGNOSIS — R42 VERTIGO: Primary | ICD-10-CM

## 2018-11-25 DIAGNOSIS — R06.02 SHORTNESS OF BREATH: ICD-10-CM

## 2018-11-25 LAB
ALBUMIN SERPL-MCNC: 4.46 G/DL (ref 3.2–4.8)
ALBUMIN/GLOB SERPL: 1.9 G/DL (ref 1.5–2.5)
ALP SERPL-CCNC: 69 U/L (ref 25–100)
ALT SERPL W P-5'-P-CCNC: 19 U/L (ref 7–40)
ANION GAP SERPL CALCULATED.3IONS-SCNC: 6 MMOL/L (ref 3–11)
AST SERPL-CCNC: 25 U/L (ref 0–33)
BASOPHILS # BLD AUTO: 0.1 10*3/MM3 (ref 0–0.2)
BASOPHILS NFR BLD AUTO: 1.4 % (ref 0–1)
BILIRUB SERPL-MCNC: 1.2 MG/DL (ref 0.3–1.2)
BILIRUB UR QL STRIP: NEGATIVE
BNP SERPL-MCNC: 477 PG/ML (ref 0–100)
BUN BLD-MCNC: 43 MG/DL (ref 9–23)
BUN/CREAT SERPL: 22.8 (ref 7–25)
CALCIUM SPEC-SCNC: 8.9 MG/DL (ref 8.7–10.4)
CHLORIDE SERPL-SCNC: 110 MMOL/L (ref 99–109)
CLARITY UR: CLEAR
CO2 SERPL-SCNC: 23 MMOL/L (ref 20–31)
COLOR UR: YELLOW
CREAT BLD-MCNC: 1.89 MG/DL (ref 0.6–1.3)
DEPRECATED RDW RBC AUTO: 48.2 FL (ref 37–54)
EOSINOPHIL # BLD AUTO: 0.35 10*3/MM3 (ref 0–0.3)
EOSINOPHIL NFR BLD AUTO: 4.9 % (ref 0–3)
ERYTHROCYTE [DISTWIDTH] IN BLOOD BY AUTOMATED COUNT: 13.3 % (ref 11.3–14.5)
GFR SERPL CREATININE-BSD FRML MDRD: 25 ML/MIN/1.73
GLOBULIN UR ELPH-MCNC: 2.3 GM/DL
GLUCOSE BLD-MCNC: 97 MG/DL (ref 70–100)
GLUCOSE UR STRIP-MCNC: NEGATIVE MG/DL
HCT VFR BLD AUTO: 34.4 % (ref 34.5–44)
HGB BLD-MCNC: 11.6 G/DL (ref 11.5–15.5)
HGB UR QL STRIP.AUTO: NEGATIVE
HOLD SPECIMEN: NORMAL
HOLD SPECIMEN: NORMAL
IMM GRANULOCYTES # BLD: 0.01 10*3/MM3 (ref 0–0.03)
IMM GRANULOCYTES NFR BLD: 0.1 % (ref 0–0.6)
KETONES UR QL STRIP: NEGATIVE
LEUKOCYTE ESTERASE UR QL STRIP.AUTO: NEGATIVE
LYMPHOCYTES # BLD AUTO: 1.04 10*3/MM3 (ref 0.6–4.8)
LYMPHOCYTES NFR BLD AUTO: 14.7 % (ref 24–44)
MCH RBC QN AUTO: 33.3 PG (ref 27–31)
MCHC RBC AUTO-ENTMCNC: 33.7 G/DL (ref 32–36)
MCV RBC AUTO: 98.9 FL (ref 80–99)
MONOCYTES # BLD AUTO: 0.78 10*3/MM3 (ref 0–1)
MONOCYTES NFR BLD AUTO: 11 % (ref 0–12)
NEUTROPHILS # BLD AUTO: 4.81 10*3/MM3 (ref 1.5–8.3)
NEUTROPHILS NFR BLD AUTO: 67.9 % (ref 41–71)
NITRITE UR QL STRIP: NEGATIVE
PH UR STRIP.AUTO: 5.5 [PH] (ref 5–8)
PLATELET # BLD AUTO: 175 10*3/MM3 (ref 150–450)
PMV BLD AUTO: 10.4 FL (ref 6–12)
POTASSIUM BLD-SCNC: 3.8 MMOL/L (ref 3.5–5.5)
PROT SERPL-MCNC: 6.8 G/DL (ref 5.7–8.2)
PROT UR QL STRIP: NEGATIVE
RBC # BLD AUTO: 3.48 10*6/MM3 (ref 3.89–5.14)
SODIUM BLD-SCNC: 139 MMOL/L (ref 132–146)
SP GR UR STRIP: 1.01 (ref 1–1.03)
TROPONIN I SERPL-MCNC: 0 NG/ML (ref 0–0.07)
TROPONIN I SERPL-MCNC: 0 NG/ML (ref 0–0.07)
UROBILINOGEN UR QL STRIP: NORMAL
WBC NRBC COR # BLD: 7.09 10*3/MM3 (ref 3.5–10.8)
WHOLE BLOOD HOLD SPECIMEN: NORMAL
WHOLE BLOOD HOLD SPECIMEN: NORMAL

## 2018-11-25 PROCEDURE — 84484 ASSAY OF TROPONIN QUANT: CPT

## 2018-11-25 PROCEDURE — 96360 HYDRATION IV INFUSION INIT: CPT

## 2018-11-25 PROCEDURE — 71045 X-RAY EXAM CHEST 1 VIEW: CPT

## 2018-11-25 PROCEDURE — 93005 ELECTROCARDIOGRAM TRACING: CPT | Performed by: EMERGENCY MEDICINE

## 2018-11-25 PROCEDURE — 80053 COMPREHEN METABOLIC PANEL: CPT | Performed by: EMERGENCY MEDICINE

## 2018-11-25 PROCEDURE — 99285 EMERGENCY DEPT VISIT HI MDM: CPT

## 2018-11-25 PROCEDURE — 85025 COMPLETE CBC W/AUTO DIFF WBC: CPT | Performed by: EMERGENCY MEDICINE

## 2018-11-25 PROCEDURE — 81003 URINALYSIS AUTO W/O SCOPE: CPT | Performed by: EMERGENCY MEDICINE

## 2018-11-25 PROCEDURE — 83880 ASSAY OF NATRIURETIC PEPTIDE: CPT | Performed by: EMERGENCY MEDICINE

## 2018-11-25 PROCEDURE — 96361 HYDRATE IV INFUSION ADD-ON: CPT

## 2018-11-25 RX ORDER — MECLIZINE HYDROCHLORIDE 25 MG/1
25 TABLET ORAL ONCE
Status: COMPLETED | OUTPATIENT
Start: 2018-11-25 | End: 2018-11-25

## 2018-11-25 RX ORDER — MECLIZINE HYDROCHLORIDE 25 MG/1
25 TABLET ORAL EVERY 6 HOURS PRN
Qty: 20 TABLET | Refills: 0 | Status: SHIPPED | OUTPATIENT
Start: 2018-11-25

## 2018-11-25 RX ORDER — ONDANSETRON 4 MG/1
4 TABLET, ORALLY DISINTEGRATING ORAL ONCE
Status: COMPLETED | OUTPATIENT
Start: 2018-11-25 | End: 2018-11-25

## 2018-11-25 RX ORDER — HYDROXYZINE HYDROCHLORIDE 25 MG/1
25 TABLET, FILM COATED ORAL 3 TIMES DAILY PRN
Qty: 20 TABLET | Refills: 0 | Status: SHIPPED | OUTPATIENT
Start: 2018-11-25

## 2018-11-25 RX ORDER — SODIUM CHLORIDE 0.9 % (FLUSH) 0.9 %
10 SYRINGE (ML) INJECTION AS NEEDED
Status: DISCONTINUED | OUTPATIENT
Start: 2018-11-25 | End: 2018-11-25 | Stop reason: HOSPADM

## 2018-11-25 RX ORDER — ASPIRIN 81 MG/1
81 TABLET ORAL DAILY
COMMUNITY
End: 2019-10-01

## 2018-11-25 RX ORDER — ONDANSETRON 4 MG/1
4 TABLET, ORALLY DISINTEGRATING ORAL EVERY 8 HOURS PRN
Qty: 6 TABLET | Refills: 0 | Status: SHIPPED | OUTPATIENT
Start: 2018-11-25

## 2018-11-25 RX ADMIN — MECLIZINE HYDROCHLORIDE 25 MG: 25 TABLET ORAL at 07:08

## 2018-11-25 RX ADMIN — SODIUM CHLORIDE 1000 ML: 9 INJECTION, SOLUTION INTRAVENOUS at 08:53

## 2018-11-25 RX ADMIN — ONDANSETRON 4 MG: 4 TABLET, ORALLY DISINTEGRATING ORAL at 07:08

## 2018-11-25 NOTE — ED PROVIDER NOTES
Subjective   Mrs Jalloh presents with the complaint of dizziness shortness of breath and nausea.  She tells me she woke up at 4:30 short of breath, having tightness in her throat, and dizzy.  The dizziness is described as vertigo and worse with position change but also when she stands up.  She notes tightness in her chest.  She tells me she hasn't felt well for a couple of days and is not able to give any specifics but tells me she has just felt weak.  She has had similar symptoms in the past that have been attributed to congestive heart failure but also vertigo.  She took Lasix at 4:30 this morning and tells me she hasn't had much urinary output since then area she has not taken her morning medicines but usually doesn't do so until 11:00.  She has history of systolic CHF with ejection fraction of 30% on her last echo which was September of last year.  She had a heart catheter in July of last year showing normal coronary arteries.        History provided by:  Patient  Dizziness   Quality:  Lightheadedness and vertigo  Severity:  Moderate  Onset quality:  Sudden  Timing:  Constant  Chronicity:  Recurrent  Context: head movement and standing up    Relieved by:  Nothing  Associated symptoms: chest pain, nausea, shortness of breath and weakness    Associated symptoms: no headaches and no vomiting        Review of Systems   Constitutional: Negative for fever.   HENT: Positive for congestion and rhinorrhea.    Respiratory: Positive for shortness of breath. Negative for cough.    Cardiovascular: Positive for chest pain.   Gastrointestinal: Positive for nausea. Negative for abdominal distention, abdominal pain and vomiting.   Genitourinary: Negative for difficulty urinating.   Neurological: Positive for dizziness and weakness. Negative for headaches.       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 (CMS/HCC)        Allergies   Allergen Reactions   • Ace Inhibitors Rash   • Latex Rash       Past Surgical History:   Procedure Laterality Date   • APPENDECTOMY     • BLADDER SUSPENSION     • CHOLECYSTECTOMY     • HYSTERECTOMY         Family History   Problem Relation Age of Onset   • Heart disease Mother    • Heart disease Father    • Early death Sister    • Heart disease Sister    • Heart disease Brother    • Cancer Daughter    • Heart disease Maternal Grandmother        Social History     Socioeconomic History   • Marital status:      Spouse name: Not on file   • Number of children: Not on file   • Years of education: Not on file   • Highest education level: Not on file   Occupational History   • Occupation: Retired   Tobacco Use   • Smoking status: Never Smoker   • Smokeless tobacco: Never Used   Substance and Sexual Activity   • Alcohol use: No   • Drug use: No   • Sexual activity: Defer   Social History Narrative    Daughter just  from cancer 2017, lots of stress.  She lives alone now, but has two sisters that check on her often and another daughter that lives a mile away.        Caffeine :0-1 servings per day.           Objective   Physical Exam   Constitutional: She appears well-developed and well-nourished. No distress.   HENT:   Head: Normocephalic and atraumatic.   Mouth/Throat: Oropharynx is clear and moist.   Neck: Normal range of motion. Neck supple. No JVD present.   Cardiovascular: Normal rate and normal heart sounds.   No murmur heard.  Pulmonary/Chest: She has no wheezes. She has rales in the right lower field and the left lower field.   Abdominal: Soft. Bowel sounds are normal.   Mild diffuse tenderness   Musculoskeletal:        Right lower leg: Normal.        Left lower leg: Normal. She exhibits no tenderness and no edema.   Neurological: She is alert.   Skin: Skin is warm and dry. Capillary refill takes less than 2 seconds.    Psychiatric: She has a normal mood and affect. Her behavior is normal.   Nursing note and vitals reviewed.      Procedures           ED Course  ED Course as of Nov 25 1611   Sun Nov 25, 2018   0650 BP now 165/65  [DT]   0844 BNP is elevated but so is creatinine.  Chest x-ray does not show CHF.  Lungs are clear and saturations are good.  We performed orthostatics and she was dizzy upon sitting upright and standing and her heart rate 1 up a little bit.  I will give IV fluids.  She's been taking extra Lasix and tells me she's not had any increased urinary output as a result.  [DT]   1015 I spoke w Mrs Jalloh and her daughter about findings thus far.  Second troponin is negative.  She is feeling better with IV fluids and is almost done with a liter.  She is reclining in bed and her saturations are in the high 90s.  I think today she is a little on the dehydrated side and I told her not to take any additional Lasix and to continue to wait herself daily.  Her daughter requests that I prescribe something for anxiety and I we'll prescribe Vistaril.  She does have history of vertigo and had some vertigo today and has done well after the Antivert so will prescribe that too.  [DT]      ED Course User Index  [DT] Julian Miramontes MD                  MDM  Number of Diagnoses or Management Options  Elevated blood pressure reading with diagnosis of hypertension:   Shortness of breath: new and requires workup  Vertigo:   Volume depletion: new and requires workup     Amount and/or Complexity of Data Reviewed  Clinical lab tests: ordered and reviewed  Tests in the radiology section of CPT®: ordered and reviewed  Decide to obtain previous medical records or to obtain history from someone other than the patient: yes  Review and summarize past medical records: yes  Independent visualization of images, tracings, or specimens: yes    Patient Progress  Patient progress: improved        Final diagnoses:   Vertigo   Shortness of breath    Volume depletion   Elevated blood pressure reading with diagnosis of hypertension            Julian Miramontes MD  11/25/18 8975

## 2018-11-25 NOTE — DISCHARGE INSTRUCTIONS
Continue to weigh yourself daily.  Return to the emergency department if you have worsening of your breathing or any concerns.

## 2019-03-26 NOTE — PROGRESS NOTES
Subjective:     Encounter Date:03/27/2019    Primary Care Physician: Eddie Flanagan MD      Patient ID: Sylvia Jalloh is a 84 y.o. female.    Chief Complaint:Cardiomyopathy and Follow-up    PROBLEM LIST:  1. Acute on chronic heart failure/ takutsubo cardiomyopathy  a. Echo 7/28/2017 showed EF 21-30%, BNP >1000 and bilateral effusions noted.  b. LHC 7/31/2017: Normal coronary arteries with low normal LVEF of 45-50%.  c. Recurrent heart failure 9/2017.  Echo LVEF 30%.  d. Echo 2/18 LVEF 40%  2. LBBB.  3. Hypertension  4. Left pleural effusion  5. Hypothyroidism   a. TSH elevated, normal T4, per hospitalist.  6. CKD stage 3, GFR 30-59 ml/min  7. Scoliosis      Allergies   Allergen Reactions   • Ace Inhibitors Rash   • Latex Rash         Current Outpatient Medications:   •  AMLODIPINE BESYLATE PO, Take 2.5 mg by mouth Daily. Per patient, take two tabs daily, Disp: , Rfl:   •  aspirin 81 MG EC tablet, Take 81 mg by mouth Daily., Disp: , Rfl:   •  carvedilol (COREG) 12.5 MG tablet, Take 12.5 mg by mouth 2 (Two) Times a Day With Meals., Disp: , Rfl:   •  cetirizine (zyrTEC) 10 MG tablet, Take 10 mg by mouth Daily As Needed for Allergies., Disp: , Rfl:   •  ENTRESTO 49-51 MG tablet, Take 1 tablet by mouth 2 (Two) Times a Day., Disp: 60 tablet, Rfl: 8  •  estradiol (ESTRACE) 1 MG tablet, Take 1 mg by mouth Daily. Taking 1/2 tablet qd, Disp: , Rfl:   •  furosemide (LASIX) 20 MG tablet, Take 20 mg by mouth Daily., Disp: , Rfl:   •  hydrOXYzine (ATARAX) 25 MG tablet, Take 1 tablet by mouth 3 (Three) Times a Day As Needed for Anxiety., Disp: 20 tablet, Rfl: 0  •  levothyroxine (SYNTHROID, LEVOTHROID) 75 MCG tablet, Take 75 mcg by mouth Daily., Disp: , Rfl:   •  meclizine (ANTIVERT) 25 MG tablet, Take 1 tablet by mouth Every 6 (Six) Hours As Needed for dizziness., Disp: 20 tablet, Rfl: 0  •  Multiple Vitamins-Minerals (MULTIVITAMIN ADULTS 50+) tablet, Take 1 tablet by mouth Daily., Disp: , Rfl:   •  ondansetron ODT  "(ZOFRAN-ODT) 4 MG disintegrating tablet, Take 1 tablet by mouth Every 8 (Eight) Hours As Needed for Nausea or Vomiting., Disp: 6 tablet, Rfl: 0  •  spironolactone (ALDACTONE) 25 MG tablet, Take 1 tablet by mouth Daily., Disp: 30 tablet, Rfl: 1  •  Vitamin D, Cholecalciferol, 1000 UNITS capsule, Take 1,000 Units by mouth Daily., Disp: , Rfl:         History of Present Illness    Patient returns today for six-month follow-up of nonischemic/Takot subo cardiomyopathy.  Since last being seen she notes to overall be doing well.  She has been diagnosed with scoliosis and is currently doing physical therapy for this.  She denies any chest pain, pressure.  She has intermittently been taking some extra Lasix.  This occurs once every 2-3 weeks.  No reported syncope, near syncope, or edema.  She brings in a blood pressure log with her today which shows systolic blood pressures typically of 140.    The following portions of the patient's history were reviewed and updated as appropriate: allergies, current medications, past family history, past medical history, past social history, past surgical history and problem list.      Social History     Tobacco Use   • Smoking status: Never Smoker   • Smokeless tobacco: Never Used   Substance Use Topics   • Alcohol use: No   • Drug use: No         Review of Systems   Constitution: Negative.   Cardiovascular: Positive for orthopnea.   Respiratory: Negative.    Hematologic/Lymphatic: Negative for bleeding problem. Does not bruise/bleed easily.   Skin: Positive for dry skin. Negative for rash.   Musculoskeletal: Positive for back pain. Negative for muscle weakness and myalgias.   Gastrointestinal: Negative for heartburn, nausea and vomiting.   Genitourinary: Positive for bladder incontinence and frequency.   Neurological: Negative.           Objective:    height is 157.5 cm (62\") and weight is 59 kg (130 lb). Her blood pressure is 140/68 and her pulse is 66. Her oxygen saturation is 98%. "         Physical Exam   Constitutional: She is oriented to person, place, and time. She appears well-developed and well-nourished. No distress.   Neck: No JVD present. No tracheal deviation present.   Cardiovascular: Normal rate, regular rhythm, normal heart sounds and intact distal pulses. Exam reveals no friction rub.   No murmur heard.  Pulmonary/Chest: Effort normal and breath sounds normal. No respiratory distress.   Abdominal: Soft. Bowel sounds are normal. There is no tenderness.   Musculoskeletal: She exhibits no edema or deformity.   Neurological: She is alert and oriented to person, place, and time.   Skin: Skin is warm and dry.       Procedures          Assessment:   Assessment/Plan      Sylvia was seen today for cardiomyopathy and follow-up.    Diagnoses and all orders for this visit:    Non-ischemic cardiomyopathy (CMS/HCC), stable.  No current signs of heart failure.    Bundle branch block, left.  Stable.    Essential hypertension.  Controlled.      Plan:  1. Continue ACE inhibitor and beta blockade for hypertension as well as cardiomyopathy.  2. Continue diuretics for chronic systolic heart failure.  3. Follow-up in 6 months time or sooner if needed.       LOREN Jay   Dictated utilizing Dragon dictation

## 2019-03-27 ENCOUNTER — OFFICE VISIT (OUTPATIENT)
Dept: CARDIOLOGY | Facility: CLINIC | Age: 84
End: 2019-03-27

## 2019-03-27 VITALS
SYSTOLIC BLOOD PRESSURE: 140 MMHG | DIASTOLIC BLOOD PRESSURE: 68 MMHG | BODY MASS INDEX: 23.92 KG/M2 | OXYGEN SATURATION: 98 % | WEIGHT: 130 LBS | HEART RATE: 66 BPM | HEIGHT: 62 IN

## 2019-03-27 DIAGNOSIS — I44.7 BUNDLE BRANCH BLOCK, LEFT: Chronic | ICD-10-CM

## 2019-03-27 DIAGNOSIS — I42.8 NON-ISCHEMIC CARDIOMYOPATHY (HCC): Primary | ICD-10-CM

## 2019-03-27 DIAGNOSIS — I10 ESSENTIAL HYPERTENSION: ICD-10-CM

## 2019-03-27 PROCEDURE — 99213 OFFICE O/P EST LOW 20 MIN: CPT | Performed by: NURSE PRACTITIONER

## 2019-03-27 RX ORDER — CETIRIZINE HYDROCHLORIDE 10 MG/1
10 TABLET ORAL DAILY PRN
COMMUNITY
End: 2020-10-07

## 2019-03-27 RX ORDER — LEVOTHYROXINE SODIUM 0.07 MG/1
75 TABLET ORAL DAILY
COMMUNITY

## 2019-03-27 RX ORDER — FUROSEMIDE 20 MG/1
20 TABLET ORAL DAILY
COMMUNITY

## 2019-03-27 RX ORDER — CARVEDILOL 12.5 MG/1
12.5 TABLET ORAL 2 TIMES DAILY WITH MEALS
COMMUNITY

## 2019-07-05 RX ORDER — SACUBITRIL AND VALSARTAN 49; 51 MG/1; MG/1
1 TABLET, FILM COATED ORAL
Qty: 180 TABLET | Refills: 3 | Status: SHIPPED | OUTPATIENT
Start: 2019-07-05 | End: 2020-07-08

## 2019-10-01 ENCOUNTER — OFFICE VISIT (OUTPATIENT)
Dept: CARDIOLOGY | Facility: CLINIC | Age: 84
End: 2019-10-01

## 2019-10-01 VITALS
DIASTOLIC BLOOD PRESSURE: 72 MMHG | SYSTOLIC BLOOD PRESSURE: 150 MMHG | WEIGHT: 131 LBS | HEART RATE: 80 BPM | HEIGHT: 62 IN | BODY MASS INDEX: 24.11 KG/M2

## 2019-10-01 DIAGNOSIS — I10 ESSENTIAL HYPERTENSION: ICD-10-CM

## 2019-10-01 DIAGNOSIS — I42.8 NON-ISCHEMIC CARDIOMYOPATHY (HCC): Primary | ICD-10-CM

## 2019-10-01 PROCEDURE — 99214 OFFICE O/P EST MOD 30 MIN: CPT | Performed by: INTERNAL MEDICINE

## 2020-07-07 ENCOUNTER — HOSPITAL ENCOUNTER (EMERGENCY)
Facility: HOSPITAL | Age: 85
Discharge: HOME | End: 2020-07-07
Attending: EMERGENCY MEDICINE | Admitting: EMERGENCY MEDICINE

## 2020-07-07 ENCOUNTER — APPOINTMENT (OUTPATIENT)
Dept: GENERAL RADIOLOGY | Facility: HOSPITAL | Age: 85
End: 2020-07-07

## 2020-07-07 VITALS
DIASTOLIC BLOOD PRESSURE: 65 MMHG | TEMPERATURE: 98 F | OXYGEN SATURATION: 98 % | WEIGHT: 130 LBS | HEART RATE: 63 BPM | RESPIRATION RATE: 18 BRPM | SYSTOLIC BLOOD PRESSURE: 131 MMHG | HEIGHT: 62 IN | BODY MASS INDEX: 23.92 KG/M2

## 2020-07-07 DIAGNOSIS — I50.9 ACUTE ON CHRONIC CONGESTIVE HEART FAILURE, UNSPECIFIED HEART FAILURE TYPE (HCC): Primary | ICD-10-CM

## 2020-07-07 DIAGNOSIS — R06.00 ACUTE DYSPNEA: ICD-10-CM

## 2020-07-07 LAB
ALBUMIN SERPL-MCNC: 4.7 G/DL (ref 3.5–5.2)
ALBUMIN/GLOB SERPL: 1.7 G/DL
ALP SERPL-CCNC: 65 U/L (ref 39–117)
ALT SERPL W P-5'-P-CCNC: 15 U/L (ref 1–33)
ANION GAP SERPL CALCULATED.3IONS-SCNC: 14 MMOL/L (ref 5–15)
AST SERPL-CCNC: 28 U/L (ref 1–32)
BASOPHILS # BLD AUTO: 0.08 10*3/MM3 (ref 0–0.2)
BASOPHILS NFR BLD AUTO: 1 % (ref 0–1.5)
BILIRUB SERPL-MCNC: 1.3 MG/DL (ref 0–1.2)
BUN SERPL-MCNC: 18 MG/DL (ref 8–23)
BUN/CREAT SERPL: 12.2 (ref 7–25)
CALCIUM SPEC-SCNC: 9.2 MG/DL (ref 8.6–10.5)
CHLORIDE SERPL-SCNC: 102 MMOL/L (ref 98–107)
CO2 SERPL-SCNC: 22 MMOL/L (ref 22–29)
CREAT SERPL-MCNC: 1.47 MG/DL (ref 0.57–1)
DEPRECATED RDW RBC AUTO: 44.1 FL (ref 37–54)
EOSINOPHIL # BLD AUTO: 0.16 10*3/MM3 (ref 0–0.4)
EOSINOPHIL NFR BLD AUTO: 2.1 % (ref 0.3–6.2)
ERYTHROCYTE [DISTWIDTH] IN BLOOD BY AUTOMATED COUNT: 12.4 % (ref 12.3–15.4)
GFR SERPL CREATININE-BSD FRML MDRD: 34 ML/MIN/1.73
GLOBULIN UR ELPH-MCNC: 2.7 GM/DL
GLUCOSE SERPL-MCNC: 122 MG/DL (ref 65–99)
HCT VFR BLD AUTO: 35.9 % (ref 34–46.6)
HGB BLD-MCNC: 12.3 G/DL (ref 12–15.9)
HOLD SPECIMEN: NORMAL
HOLD SPECIMEN: NORMAL
IMM GRANULOCYTES # BLD AUTO: 0.02 10*3/MM3 (ref 0–0.05)
IMM GRANULOCYTES NFR BLD AUTO: 0.3 % (ref 0–0.5)
LYMPHOCYTES # BLD AUTO: 1.61 10*3/MM3 (ref 0.7–3.1)
LYMPHOCYTES NFR BLD AUTO: 20.9 % (ref 19.6–45.3)
MCH RBC QN AUTO: 33.3 PG (ref 26.6–33)
MCHC RBC AUTO-ENTMCNC: 34.3 G/DL (ref 31.5–35.7)
MCV RBC AUTO: 97.3 FL (ref 79–97)
MONOCYTES # BLD AUTO: 0.66 10*3/MM3 (ref 0.1–0.9)
MONOCYTES NFR BLD AUTO: 8.5 % (ref 5–12)
NEUTROPHILS NFR BLD AUTO: 5.19 10*3/MM3 (ref 1.7–7)
NEUTROPHILS NFR BLD AUTO: 67.2 % (ref 42.7–76)
NRBC BLD AUTO-RTO: 0 /100 WBC (ref 0–0.2)
NT-PROBNP SERPL-MCNC: 1057 PG/ML (ref 0–1800)
PLATELET # BLD AUTO: 183 10*3/MM3 (ref 140–450)
PMV BLD AUTO: 10.7 FL (ref 6–12)
POTASSIUM SERPL-SCNC: 3.7 MMOL/L (ref 3.5–5.2)
PROT SERPL-MCNC: 7.4 G/DL (ref 6–8.5)
RBC # BLD AUTO: 3.69 10*6/MM3 (ref 3.77–5.28)
SODIUM SERPL-SCNC: 138 MMOL/L (ref 136–145)
TROPONIN T SERPL-MCNC: <0.01 NG/ML (ref 0–0.03)
WBC # BLD AUTO: 7.72 10*3/MM3 (ref 3.4–10.8)
WHOLE BLOOD HOLD SPECIMEN: NORMAL
WHOLE BLOOD HOLD SPECIMEN: NORMAL

## 2020-07-07 PROCEDURE — 93005 ELECTROCARDIOGRAM TRACING: CPT | Performed by: EMERGENCY MEDICINE

## 2020-07-07 PROCEDURE — 85025 COMPLETE CBC W/AUTO DIFF WBC: CPT | Performed by: EMERGENCY MEDICINE

## 2020-07-07 PROCEDURE — 25010000002 FUROSEMIDE PER 20 MG: Performed by: EMERGENCY MEDICINE

## 2020-07-07 PROCEDURE — 80053 COMPREHEN METABOLIC PANEL: CPT | Performed by: EMERGENCY MEDICINE

## 2020-07-07 PROCEDURE — 96374 THER/PROPH/DIAG INJ IV PUSH: CPT

## 2020-07-07 PROCEDURE — 99285 EMERGENCY DEPT VISIT HI MDM: CPT

## 2020-07-07 PROCEDURE — 93005 ELECTROCARDIOGRAM TRACING: CPT

## 2020-07-07 PROCEDURE — 84484 ASSAY OF TROPONIN QUANT: CPT | Performed by: EMERGENCY MEDICINE

## 2020-07-07 PROCEDURE — 71045 X-RAY EXAM CHEST 1 VIEW: CPT

## 2020-07-07 PROCEDURE — 83880 ASSAY OF NATRIURETIC PEPTIDE: CPT | Performed by: EMERGENCY MEDICINE

## 2020-07-07 RX ORDER — FUROSEMIDE 20 MG/1
20 TABLET ORAL 2 TIMES DAILY
Qty: 6 TABLET | Refills: 0 | Status: SHIPPED | OUTPATIENT
Start: 2020-07-07 | End: 2020-07-10

## 2020-07-07 RX ORDER — SODIUM CHLORIDE 0.9 % (FLUSH) 0.9 %
10 SYRINGE (ML) INJECTION AS NEEDED
Status: DISCONTINUED | OUTPATIENT
Start: 2020-07-07 | End: 2020-07-07 | Stop reason: HOSPADM

## 2020-07-07 RX ORDER — AMLODIPINE BESYLATE 2.5 MG/1
2.5 TABLET ORAL ONCE
Status: COMPLETED | OUTPATIENT
Start: 2020-07-07 | End: 2020-07-07

## 2020-07-07 RX ORDER — SPIRONOLACTONE 25 MG/1
25 TABLET ORAL ONCE
Status: COMPLETED | OUTPATIENT
Start: 2020-07-07 | End: 2020-07-07

## 2020-07-07 RX ORDER — CARVEDILOL 12.5 MG/1
12.5 TABLET ORAL ONCE
Status: COMPLETED | OUTPATIENT
Start: 2020-07-07 | End: 2020-07-07

## 2020-07-07 RX ORDER — FUROSEMIDE 10 MG/ML
40 INJECTION INTRAMUSCULAR; INTRAVENOUS ONCE
Status: COMPLETED | OUTPATIENT
Start: 2020-07-07 | End: 2020-07-07

## 2020-07-07 RX ADMIN — SACUBITRIL AND VALSARTAN 1 TABLET: 49; 51 TABLET, FILM COATED ORAL at 05:55

## 2020-07-07 RX ADMIN — CARVEDILOL 12.5 MG: 12.5 TABLET, FILM COATED ORAL at 05:55

## 2020-07-07 RX ADMIN — SPIRONOLACTONE 25 MG: 25 TABLET ORAL at 05:55

## 2020-07-07 RX ADMIN — AMLODIPINE BESYLATE 2.5 MG: 2.5 TABLET ORAL at 05:55

## 2020-07-07 RX ADMIN — FUROSEMIDE 40 MG: 10 INJECTION, SOLUTION INTRAMUSCULAR; INTRAVENOUS at 05:54

## 2020-07-07 NOTE — ED PROVIDER NOTES
Subjective   85-year-old female brought in by [facility] for evaluation of shortness of breath.  The patient reports sudden onset of shortness of breath that awoke her from sleep at approximately 3:30 AM this morning.  At 4:30am she took a single 20 mg of Lasix.  That has made her urinate, but has not significantly affected her shortness of breath at this given time.  She denies any recent cough, chest pain, rhinorrhea, sore throat, change in sense of taste or smell.  She denies any infectious symptoms including no fever, bodies, or chills.  She feels this current presentation is consistent with a CHF exacerbation which she has had multiple times in the past.  She does take both Lasix and spironolactone on a regular basis.  She is followed by Dr. Vega of the cardiology service for her congestive heart failure.  She reports that she has gained approximately 5 pounds within the last week.  She denies any abdominal pain, no nausea or vomiting, no change in bowel or urinary function.  She has normal mentation upon presentation here with a GCS of 15.          Review of Systems   Constitutional: Positive for unexpected weight change. Negative for chills, fatigue and fever.   HENT: Negative for congestion, ear pain, postnasal drip, sinus pressure and sore throat.    Eyes: Negative for pain, redness and visual disturbance.   Respiratory: Positive for shortness of breath. Negative for cough and chest tightness.    Cardiovascular: Negative for chest pain, palpitations and leg swelling.   Gastrointestinal: Negative for abdominal pain, anal bleeding, blood in stool, diarrhea, nausea and vomiting.   Endocrine: Negative for polydipsia and polyuria.   Genitourinary: Negative for difficulty urinating, dysuria, frequency and urgency.   Musculoskeletal: Negative for arthralgias, back pain and neck pain.   Skin: Negative for pallor and rash.   Allergic/Immunologic: Negative for environmental allergies and immunocompromised state.    Neurological: Negative for dizziness, weakness and headaches.   Hematological: Negative for adenopathy.   Psychiatric/Behavioral: Negative for confusion, self-injury and suicidal ideas. The patient is not nervous/anxious.    All other systems reviewed and are negative.      Past Medical History:   Diagnosis Date   • Chronic kidney disease    • Disease of thyroid gland    • Gallstone pancreatitis    • Hypertension    • Systolic heart failure secondary to idiopathic cardiomyopathy (CMS/HCC)        Allergies   Allergen Reactions   • Ace Inhibitors Rash   • Latex Rash       Past Surgical History:   Procedure Laterality Date   • APPENDECTOMY     • BLADDER SUSPENSION     • CARDIAC CATHETERIZATION N/A 2017    Procedure: Coronary angiography;  Surgeon: Thiago Vega MD;  Location:  [facility];  Service:    • CHOLECYSTECTOMY     • HYSTERECTOMY         Family History   Problem Relation Age of Onset   • Heart disease Mother    • Heart disease Father    • Early death Sister    • Heart disease Sister    • Heart disease Brother    • Cancer Daughter    • Heart disease Maternal Grandmother        Social History     Socioeconomic History   • Marital status:      Spouse name: Not on file   • Number of children: Not on file   • Years of education: Not on file   • Highest education level: Not on file   Occupational History   • Occupation: Retired   Tobacco Use   • Smoking status: Never Smoker   • Smokeless tobacco: Never Used   Substance and Sexual Activity   • Alcohol use: No   • Drug use: No   • Sexual activity: Defer   Social History Narrative    Daughter just  from cancer 2017, lots of stress.  She lives alone now, but has two sisters that check on her often and another daughter that lives a mile away.        Caffeine :0-1 servings per day.           Objective   Physical Exam   Constitutional: She is oriented to person, place, and time. She appears well-developed and well-nourished.   Non-toxic appearance. No distress.   HENT:   Head: Normocephalic and atraumatic.   Right Ear: External ear normal.   Left Ear: External ear normal.   Nose: Nose normal.   Eyes: Pupils are equal, round, and reactive to light. EOM and lids are normal.   Neck: Normal range of motion. Neck supple. No tracheal deviation present.   Cardiovascular: Normal rate, regular rhythm and normal heart sounds. Exam reveals no gallop, no friction rub and no decreased pulses.   No murmur heard.  Pulmonary/Chest: Effort normal. No respiratory distress. She has no decreased breath sounds. She has no wheezes. She has no rhonchi. She has rales in the right middle field, the right lower field, the left middle field and the left lower field.           Abdominal: Soft. Normal appearance and bowel sounds are normal. There is no tenderness. There is no rebound and no guarding.   Musculoskeletal: Normal range of motion. She exhibits no deformity.   Lymphadenopathy:     She has no cervical adenopathy.   Neurological: She is alert and oriented to person, place, and time. She has normal strength. No cranial nerve deficit or sensory deficit.   Skin: Skin is warm and dry. No rash noted. She is not diaphoretic.   Psychiatric: She has a normal mood and affect. Her speech is normal and behavior is normal. Judgment and thought content normal. Cognition and memory are normal.   Nursing note and vitals reviewed.      Procedures           ED Course                                           MDM  Number of Diagnoses or Management Options  Acute dyspnea: new and requires workup  Acute on chronic congestive heart failure, unspecified heart failure type (CMS/HCC): new and requires workup  Diagnosis management comments: Chest x-ray shows no acute disease.    On auscultation of lungs I can hear crackles in the mid lungs to the bases bilaterally.  However the patient's oxygen saturations are 98 to 100% on room air.  She does not have any signs of respiratory  distress.    No significant lab abnormalities present.    The patient was given her home medications while here in the ER and her blood pressure improved.    Patient will be discharged with the advised to take 20 mg of Lasix twice a day instead of once a day for the next 3 days.  She is advised to avoid excessive fluid or sodium intake.    Advised to follow-up with primary care physician for recheck within the next 3 days, and return to the ER with any further concern or worsening symptoms.       Amount and/or Complexity of Data Reviewed  Clinical lab tests: ordered and reviewed  Tests in the radiology section of CPT®: ordered and reviewed  Decide to obtain previous medical records or to obtain history from someone other than the patient: yes  Obtain history from someone other than the patient: yes  Review and summarize past medical records: yes  Independent visualization of images, tracings, or specimens: yes        Final diagnoses:   Acute on chronic congestive heart failure, unspecified heart failure type (CMS/HCC)   Acute dyspnea            Sue Yen MD  07/07/20 9759     98.8

## 2020-07-07 NOTE — DISCHARGE INSTRUCTIONS
The patient is advised to take 20 mg of Lasix twice a day instead of once a day.    Avoid excessive fluid or sodium intake.    Follow-up with primary care physician for recheck within the next week.    Return to the ER with any further concern.

## 2020-07-08 RX ORDER — SACUBITRIL AND VALSARTAN 49; 51 MG/1; MG/1
1 TABLET, FILM COATED ORAL
Qty: 180 TABLET | Refills: 3 | Status: SHIPPED | OUTPATIENT
Start: 2020-07-08

## 2020-10-07 ENCOUNTER — OFFICE VISIT (OUTPATIENT)
Dept: CARDIOLOGY | Facility: CLINIC | Age: 85
End: 2020-10-07

## 2020-10-07 VITALS
WEIGHT: 133 LBS | HEART RATE: 71 BPM | DIASTOLIC BLOOD PRESSURE: 86 MMHG | OXYGEN SATURATION: 97 % | BODY MASS INDEX: 26.11 KG/M2 | HEIGHT: 60 IN | SYSTOLIC BLOOD PRESSURE: 158 MMHG

## 2020-10-07 DIAGNOSIS — I42.8 NON-ISCHEMIC CARDIOMYOPATHY (HCC): Primary | ICD-10-CM

## 2020-10-07 DIAGNOSIS — I10 ESSENTIAL HYPERTENSION: ICD-10-CM

## 2020-10-07 PROCEDURE — 99213 OFFICE O/P EST LOW 20 MIN: CPT | Performed by: INTERNAL MEDICINE

## 2020-10-07 RX ORDER — CETIRIZINE HYDROCHLORIDE 10 MG/1
10 TABLET ORAL DAILY
COMMUNITY

## 2020-10-07 NOTE — PROGRESS NOTES
Subjective:     Encounter Date:10/07/2020    Primary Care Physician: Eddie Flanagan MD      Patient ID: Sylvia Jalloh is a 86 y.o. female.    Chief Complaint:Follow-up    PROBLEM LIST:  1. Acute on chronic heart failure/ takutsubo cardiomyopathy  a. Echo 7/28/2017 showed EF 21-30%, BNP >1000 and bilateral effusions noted.  b. LHC 7/31/2017: Normal coronary arteries with low normal LVEF of 45-50%.  c. Recurrent heart failure 9/2017.  Echo LVEF 30%.  d. Echo 2/18 LVEF 40%  2. LBBB.  3. Hypertension  4. Left pleural effusion  5. Hypothyroidism   a. TSH elevated, normal T4, per hospitalist.  6. CKD stage 3, GFR 30-59 ml/min  7. Iron deficiency anemia  8. Scoliosis  9. Bilateral cataract surgery 2020      Allergies   Allergen Reactions   • Ace Inhibitors Rash   • Latex Rash         Current Outpatient Medications:   •  AMLODIPINE BESYLATE PO, Take 2.5 mg by mouth 2 (Two) Times a Day. Per patient, take two tabs daily , Disp: , Rfl:   •  carvedilol (COREG) 12.5 MG tablet, Take 12.5 mg by mouth 2 (Two) Times a Day With Meals., Disp: , Rfl:   •  cetirizine (zyrTEC) 10 MG tablet, Take 10 mg by mouth Daily., Disp: , Rfl:   •  ENTRESTO 49-51 MG tablet, Take 1 tablet by mouth 2 (Two) Times a Day., Disp: 180 tablet, Rfl: 3  •  estradiol (ESTRACE) 1 MG tablet, Take 1 mg by mouth Daily. Taking 1/2 tablet qd, Disp: , Rfl:   •  furosemide (LASIX) 20 MG tablet, Take 20 mg by mouth Daily., Disp: , Rfl:   •  hydrOXYzine (ATARAX) 25 MG tablet, Take 1 tablet by mouth 3 (Three) Times a Day As Needed for Anxiety., Disp: 20 tablet, Rfl: 0  •  levothyroxine (SYNTHROID, LEVOTHROID) 75 MCG tablet, Take 75 mcg by mouth Daily., Disp: , Rfl:   •  meclizine (ANTIVERT) 25 MG tablet, Take 1 tablet by mouth Every 6 (Six) Hours As Needed for dizziness., Disp: 20 tablet, Rfl: 0  •  Multiple Vitamins-Minerals (MULTIVITAMIN ADULTS 50+) tablet, Take 1 tablet by mouth Daily., Disp: , Rfl:   •  ondansetron ODT (ZOFRAN-ODT) 4 MG disintegrating  "tablet, Take 1 tablet by mouth Every 8 (Eight) Hours As Needed for Nausea or Vomiting., Disp: 6 tablet, Rfl: 0  •  spironolactone (ALDACTONE) 25 MG tablet, Take 1 tablet by mouth Daily., Disp: 30 tablet, Rfl: 1  •  Vitamin D, Cholecalciferol, 1000 UNITS capsule, Take 1,000 Units by mouth Daily., Disp: , Rfl:         History of Present Illness    Patient returns today for annual follow-up of Takotsubo's cardiomyopathy and heart failure.  Since last visit she overall feels well.  She is in the emergency room on 7/7/2020 with heart failure was treated with a single dose of IV Lasix, and and since that time is returned to her baseline.  She now says she has her \"usual shortness of breath\", but overall for her age think she is active and do everything she wishes to do on a daily basis.  She is walking several times weekly.  Denies chest pain orthopnea PND or resting dyspnea.  Functional class II exertional dyspnea blood pressures at home, shows a log of 140s to 150s.  She brings in her log today which was reviewed which shows her blood pressures are elevated in the morning before her medicines but by evening are usually in the normal range.    The following portions of the patient's history were reviewed and updated as appropriate: allergies, current medications, past family history, past medical history, past social history, past surgical history and problem list.      Social History     Tobacco Use   • Smoking status: Never Smoker   • Smokeless tobacco: Never Used   Substance Use Topics   • Alcohol use: No   • Drug use: No         Review of Systems   Constitution: Negative.   HENT: Positive for tinnitus.    Cardiovascular: Positive for dyspnea on exertion. Negative for chest pain, leg swelling, palpitations and syncope.   Respiratory: Negative.  Negative for shortness of breath.    Hematologic/Lymphatic: Negative for bleeding problem. Does not bruise/bleed easily.   Skin: Negative for rash.   Musculoskeletal: Negative " "for muscle weakness and myalgias.   Gastrointestinal: Negative for heartburn, nausea and vomiting.   Genitourinary: Positive for frequency.   Neurological: Negative for dizziness, light-headedness, loss of balance and numbness.          Objective:   /86 (BP Location: Left arm)   Pulse 71   Ht 152.4 cm (60\")   Wt 60.3 kg (133 lb)   SpO2 97%   BMI 25.97 kg/m²         Vitals signs reviewed.   Constitutional:       Appearance: Well-developed and not in distress.   Neck:      Thyroid: No thyromegaly.      Vascular: No carotid bruit or JVD.   Pulmonary:      Breath sounds: Normal breath sounds.   Cardiovascular:      Regular rhythm.      Murmurs: There is a grade 2/6 systolic murmur at the URSB and ULSB.      No gallop. No S3 and S4 gallop.   Abdominal:      General: Bowel sounds are normal.      Palpations: Abdomen is soft. There is no abdominal mass.      Tenderness: There is no abdominal tenderness.   Musculoskeletal:         General: No deformity.      Extremities: No clubbing present.  Skin:     General: Skin is warm and dry.      Findings: No rash.   Neurological:      Mental Status: Alert and oriented to person, place, and time.         Procedures          Assessment:   Assessment/Plan      Sylvia was seen today for follow-up.    Diagnoses and all orders for this visit:    Non-ischemic cardiomyopathy (CMS/HCC)    Essential hypertension      1.  Nonischemic cardiomyopathy, presumed Takotsubo's.  ER follow-up for heart failure in July.  Currently euvolemic and functional class II dyspnea (baseline).  2.  Hypertension well-controlled  3.  Functional class III renal failure, baseline creatinine 1.5.  4 dyslipidemia last LDL 64    Recommendations  1.  Continue current medical therapy.  2.  Blood pressure is elevated today, but reviewing her logs shows that after her medicines in the evening her blood pressures at home are actually quite normal.  3.  Cost issues with Entresto will attempt to get tier " exception.  4.  Revisit annually or PRN symptom change    Thiago Vega MD             Dictated utilizing Dragon dictation

## 2020-10-08 ENCOUNTER — PRIOR AUTHORIZATION (OUTPATIENT)
Dept: CARDIOLOGY | Facility: CLINIC | Age: 85
End: 2020-10-08

## 2023-12-20 NOTE — PROGRESS NOTES
Adult Nutrition  Assessment/PES    Patient Name:  Sylvia Jalloh  YOB: 1934  MRN: [id number]  Admit Date:  9/6/2017    Assessment Date:  9/6/2017   Comments:  RD completed initial nutrition assessment r/t MST >2. RD to continue to follow.           Reason for Assessment       09/06/17 1532    Reason for Assessment    Reason For Assessment/Visit multidisciplinary rounds;identified at risk by screening criteria    Identified At Risk By Screening Criteria MST SCORE 2+  MST Total Score: MST score: 4    Step 1   Have you recently lost weight without trying?  If yes, leave this row unanswered and move to next row: 2--> Unsure   If yes, how much weight have you lost?: 1--> 2-13 lbs    Have you been eating poorly because of a decreased appetite?: 1--> Yes    Step 2  If MST>=2: Then Patient is At Risk    Time Spent (min) 30    Diagnosis Diagnosis    Cardiac CHF   A/C CHF    Pulmonary/Critical Care Acute respiratory failure    Renal CKD   Principal Problem:    Acute respiratory failure with hypoxia  Active Problems:    Essential hypertension    Hypothyroidism    Bundle branch block, left    CKD (chronic kidney disease) stage 3, GFR 30-59 ml/min    Acute systolic heart failure    Non-ischemic cardiomyopathy    R/O Sepsis    Elevated lactic acid level               Nutrition/Diet History       09/06/17 1537    Nutrition/Diet History    Factors Affecting Nutritional Intake Factors    Reported/Observed By Patient    Other Pt states that she had gradually lost weight since a surgery in 2015 however she has gained 13lbs back. Pt states she has been eating well and that her clothes feel much tighter lately.  RD notes pt edentulous-states her sister is bringing dentures, states she does not require altered texture               Anthropometrics       09/06/17 1539    Anthropometrics (Special Considerations)    Height Used for Calculations 1.524 m (5')    Weight Used for Calculations 54.4 kg (120 lb)   [facility]  Department of Emergency Medicine   ED  Provider Note  Admit Date/RoomTime: 12/19/2023  5:00 PM  ED Room: 04/04        History of Present Illness:  Chief Complaint   Patient presents with    Flank Pain     Pt complaining of left flank pain, diarrhea, and nausea. Pt states she has recently been diagnosed with a kidney stone.     Bloody diarrhea         Lavern Appiah is a 36 y.o. female history of kidney stones presenting to the ED for abdominal pain nausea general malaise flank pain and diarrhea.,  Patient was seen evaluated by her urologist Dr. Kelly yesterday started on Flomax.  Then developed diarrhea from the Flomax notified her urologist advised her to stop taking the medication.  She presented today because she was feeling lightheaded with generalized fatigue.  She denies any fever has had chills.  Diffuse abdominal pain.    Denies pressure urgency burning with urination.  Complains of bilateral flank pain.  Review of Systems:   Pertinent positives and negatives are stated within HPI, all other systems reviewed and are negative.        --------------------------------------------- PAST HISTORY ---------------------------------------------  Past Medical History:  has no past medical history on file.  Past Surgical History:  has no past surgical history on file.  Social History:  reports that she has been smoking cigarettes. She has been smoking an average of .5 packs per day. She has never used smokeless tobacco. She reports current alcohol use. She reports that she does not use drugs.  Family History: family history is not on file.. Unless otherwise noted, family history is non contributory  The patient’s home medications have been reviewed.  Allergies: Ciprofloxacin and Lamictal [lamotrigine]        ---------------------------------------------------PHYSICAL EXAM--------------------------------------    GENERAL APPEARANCE: Awake and alert.   VITAL SIGNS: As per the nurses' triage record.   HEENT:  "Normocephalic, atraumatic. Extraocular muscles are intact. Pupils equal round and reactive to light. Conjunctiva are pink. Negative scleral icterus. Mucous membranes are moist. Tongue in the midline. Pharynx was without erythema or exudates, uvula midline  NECK: Soft Nontender and supple, full gross ROM, no meningeal signs.  CHEST: Nontender to palpation. Clear to auscultation bilaterally. No rales, rhonchi, or wheezing.   HEART: S1, S2. Regular rate and rhythm. No murmurs, gallops or rubs.  Strong and equal pulses in the extremities.   ABDOMEN: Soft, nontender, nondistended, positive bowel sounds, no palpable masses.  MUSCULCSKELETAL: The calves are nontender to palpation. Full gross active range of motion. Ambulating on own with no acute difficulties  NEUROLOGICAL: Awake, alert and oriented x 3. Power intact in the upper and lower extremities. Sensation is intact to light touch in the upper and lower extremities.   IMMUNOLOGICAL: No lymphatic streaking noted   DERM: No petechiae, rashes, or ecchymoses.          ------------------------- NURSING NOTES AND VITALS REVIEWED ---------------------------  The nursing notes within the ED encounter and vital signs as below have been reviewed by myself  /70   Pulse 74   Temp 36.9 °C (98.4 °F) (Oral)   Resp 16   Ht 1.626 m (5' 4\")   Wt 104 kg (228 lb 2.8 oz)   SpO2 100%   BMI 39.17 kg/m²     Oxygen Saturation Interpretation: Normal    The patient’s available past medical records and past encounters were reviewed.          -----------------------DIAGNOSTIC RESULTS------------------------  LABS:    Labs Reviewed   CBC WITH AUTO DIFFERENTIAL - Abnormal       Result Value    WBC 14.8 (*)     nRBC 0.0      RBC 4.97      Hemoglobin 15.2      Hematocrit 47.2 (*)     MCV 95      MCH 30.6      MCHC 32.2      RDW 12.6      Platelets 310      Neutrophils % 70.0      Immature Granulocytes %, Automated 1.5 (*)     Lymphocytes % 21.2      Monocytes % 5.1      Eosinophils % " 9/6 RD Calculated BMI (kg/m2) 23.47    Usual Body Weight (UBW)    Usual Body Weight 56.7 kg (125 lb)            Labs/Tests/Procedures/Meds       09/06/17 1540    Labs/Tests/Procedures/Meds    Labs/Tests Review Reviewed    Medication Review Reviewed, pertinent   lasix     Results from last 7 days  Lab Units 09/06/17  0500 09/06/17  0456   CREATININE mg/dL  --  1.30   BILIRUBIN mg/dL 1.5*  --    ALK PHOS U/L 80  --    ALT (SGPT) U/L 18  --    AST (SGOT) U/L 29  --        Intake & Output (last day)       09/05 0701 - 09/06 0700 09/06 0701 - 09/07 0700    P.O.  300    Total Intake(mL/kg)  300 (5.5)    Urine (mL/kg/hr)  2075 (4.4)    Total Output   2075    Net   -1775                         Nutrition Prescription Ordered       09/06/17 1541    Nutrition Prescription PO    Current PO Diet Regular    Common Modifiers Cardiac            Evaluation of Received Nutrient/Fluid Intake       09/06/17 1541    PO Evaluation    Number of Days PO Intake Evaluated Insufficient Data              Problem/Interventions:        Problem 1       09/06/17 1541    Nutrition Diagnoses Problem 1    Problem 1 No Nutrition Diagnosis at this Time                    Intervention Goal       09/06/17 1541    Intervention Goal    General Nutrition support treatment    PO Establish PO    Weight No significant weight loss            Nutrition Intervention       09/06/17 1542    Nutrition Intervention    RD/Tech Action Follow Tx progress;Care plan reviewd;Encourage intake;Menu provided              Education/Evaluation       09/06/17 1542    Monitor/Evaluation    Monitor Per protocol;I&O;PO intake;Weight            Electronically signed by:  Laly Boggs RDN, MARCELLA  09/06/17 3:43 PM   1.5      Basophils % 0.7      Neutrophils Absolute 10.37 (*)     Immature Granulocytes Absolute, Automated 0.23      Lymphocytes Absolute 3.14      Monocytes Absolute 0.76      Eosinophils Absolute 0.23      Basophils Absolute 0.11 (*)    COMPREHENSIVE METABOLIC PANEL - Abnormal    Glucose 116 (*)     Sodium 135      Potassium 4.4      Chloride 101      Bicarbonate 21 (*)     Urea Nitrogen 13      Creatinine 0.70      eGFR >90      Calcium 9.8      Albumin 4.1      Alkaline Phosphatase 99      Total Protein 7.2      AST 24      Bilirubin, Total <0.2      ALT 29      Anion Gap 13     URINALYSIS WITH REFLEX CULTURE AND MICROSCOPIC - Abnormal    Color, Urine Colorless (*)     Appearance, Urine Clear      Specific Gravity, Urine 1.006      pH, Urine 6.0      Protein, Urine NEGATIVE      Glucose, Urine Normal      Blood, Urine NEGATIVE      Ketones, Urine NEGATIVE      Bilirubin, Urine NEGATIVE      Urobilinogen, Urine Normal      Nitrite, Urine NEGATIVE      Leukocyte Esterase, Urine NEGATIVE     LIPASE - Normal    Lipase 32     HUMAN CHORIONIC GONADOTROPIN, SERUM QUANTITATIVE    HCG, Beta-Quantitative <1     URINALYSIS WITH REFLEX CULTURE AND MICROSCOPIC    Narrative:     The following orders were created for panel order Urinalysis with Reflex Culture and Microscopic.  Procedure                               Abnormality         Status                     ---------                               -----------         ------                     Urinalysis with Reflex C...[[id number]]  Abnormal            Final result               Extra Urine Gray Tube[[id number]]                                                         Please view results for these tests on the individual orders.   EXTRA URINE GRAY TUBE       As interpreted by me, the above displayed labs are abnormal. All other labs obtained during this visit were within normal range or not returned as of this dictation.          CT abdomen pelvis w IV contrast   Final  Result   No acute abdominopelvic pathology.   Nonobstructing calculi noted in both kidneys measuring up to 6 mm in   the left kidney.        MACRO:   None        Signed by: Drake Harvey 12/19/2023 6:28 PM   Dictation workstation:   [id number]              CT abdomen pelvis w IV contrast   Final Result   No acute abdominopelvic pathology.   Nonobstructing calculi noted in both kidneys measuring up to 6 mm in   the left kidney.        MACRO:   None        Signed by: Drake Harvey 12/19/2023 6:28 PM   Dictation workstation:   [id number]              ------------------------------ ED COURSE/MEDICAL DECISION MAKING----------------------  Medical Decision Making:   Exam: A medically appropriate exam performed, outlined above, given the known history and presentation.    History obtained from: Review of medical record nursing notes patient      Social Determinants of Health considered during this visit: Lives at home      PAST MEDICAL HISTORY/Chronic Conditions Affecting Care     has no past medical history on file.       CC/HPI Summary, Social Determinants of health, Records Reviewed, DDx, testing done/not done, ED Course, Reassessment, disposition considerations/shared decision making with patient, consults, disposition:   Presents with flank pain bilateral abdominal pain nausea generalized malaise  Plan:  Tylenol  Toradol  Zofran  Lipase  CT abdomen pelvis-No acute abdominopelvic pathology.  Nonobstructing calculi noted in both kidneys measuring up to 6 mm in  the left kidney.      Normal saline x 2  CBC  CMP  Pregnancy  Urine  Medical Decision Making/Differential Diagnosis:  Differentials include not limited to viral illness versus constipation versus kidney stone versus UTI versus pyelonephritis versus pregnancy versus electrode abnormality versus dehydration versus obstruction  Review:  Glucose 116  Electrolytes within normal limits  Bicarb 21  LFTs within normal limits  White blood cell count  14.8  Hemoglobin 15.2  Lipase 32  Pregnancy negative  Urine was colorless negative for blood negative nitrites negative leukocytes  Patient presented with bilateral flank pain abdominal pain fatigue.  Left leg within normal limits electrolyte imbalance noted.  LFTs within normal limits, patient is not anemic lipase normal pregnancy negative urine is not consistent with UTI.  No peritoneal signs on abdominal exam.  Diffuse tenderness.  CT showed  No acute abdominopelvic pathology.Nonobstructing calculi noted in both kidneys measuring up to 6 mm in the left kidney.  Patient does not present like pyelonephritis.  Etiology of her abdominal pain as she may have passed a kidney stone.  Diarrhea nausea vomiting likely viral illness versus medication reaction.  Leukocytes also elevated she is nontoxic-appearing.  Likely reactive.  Versus viral illness.  Advised to follow-up with primary care physician for reevaluation patient vies to monitor for signs and symptoms worsening infection close follow-up with primary care physician.  Discharge per attending note.  Diet modifications discharged with ibuprofen and Zofran  Patient seen and evaluated with attending physician    PROCEDURES  Unless otherwise noted below, none      CONSULTS:   None      Diagnoses as of 12/19/23 2307   Leukocytosis, unspecified type   Diarrhea, unspecified type   Nausea and vomiting, unspecified vomiting type   Flank pain         This patient has remained hemodynamically stable during their ED course.      Critical Care: none       Counseling:  The emergency provider has spoken with the patient and discussed today’s results, in addition to providing specific details for the plan of care and counseling regarding the diagnosis and prognosis.  Questions are answered at this time and they are agreeable with the plan.         --------------------------------- IMPRESSION AND DISPOSITION ---------------------------------    IMPRESSION  1.  Leukocytosis, unspecified type    2. Diarrhea, unspecified type    3. Nausea and vomiting, unspecified vomiting type    4. Flank pain        DISPOSITION  Disposition: Discharge home   Patient condition is stable        NOTE: This report was transcribed using voice recognition software. Every effort was made to ensure accuracy; however, inadvertent computerized transcription errors may be present       ROBBIN Chase-[place]  12/19/23 1679

## 2024-07-22 ENCOUNTER — APPOINTMENT (OUTPATIENT)
Dept: CT IMAGING | Facility: HOSPITAL | Age: 89
End: 2024-07-22
Payer: MEDICARE

## 2024-07-22 ENCOUNTER — HOSPITAL ENCOUNTER (INPATIENT)
Facility: HOSPITAL | Age: 89
LOS: 3 days | Discharge: NURSING, REHAB OR LONG-TERM CARE | End: 2024-07-26
Attending: EMERGENCY MEDICINE | Admitting: INTERNAL MEDICINE
Payer: MEDICARE

## 2024-07-22 ENCOUNTER — APPOINTMENT (OUTPATIENT)
Dept: CARDIOLOGY | Facility: HOSPITAL | Age: 89
End: 2024-07-22
Payer: MEDICARE

## 2024-07-22 ENCOUNTER — APPOINTMENT (OUTPATIENT)
Dept: GENERAL RADIOLOGY | Facility: HOSPITAL | Age: 89
End: 2024-07-22
Payer: MEDICARE

## 2024-07-22 ENCOUNTER — APPOINTMENT (OUTPATIENT)
Dept: MRI IMAGING | Facility: HOSPITAL | Age: 89
End: 2024-07-22
Payer: MEDICARE

## 2024-07-22 DIAGNOSIS — I63.9 CEREBROVASCULAR ACCIDENT (CVA), UNSPECIFIED MECHANISM: ICD-10-CM

## 2024-07-22 DIAGNOSIS — R27.0 ATAXIA: ICD-10-CM

## 2024-07-22 DIAGNOSIS — H53.2 DOUBLE VISION: Primary | ICD-10-CM

## 2024-07-22 PROBLEM — G45.9 TIA (TRANSIENT ISCHEMIC ATTACK): Status: ACTIVE | Noted: 2024-07-22

## 2024-07-22 LAB
ALBUMIN SERPL-MCNC: 3.8 G/DL (ref 3.5–5.2)
ALBUMIN/GLOB SERPL: 1.1 G/DL
ALP SERPL-CCNC: 77 U/L (ref 39–117)
ALT SERPL W P-5'-P-CCNC: 9 U/L (ref 1–33)
ALT SERPL W P-5'-P-CCNC: 9 U/L (ref 1–33)
ANION GAP SERPL CALCULATED.3IONS-SCNC: 14 MMOL/L (ref 5–15)
APTT PPP: 44.4 SECONDS (ref 22–39)
AST SERPL-CCNC: 21 U/L (ref 1–32)
AST SERPL-CCNC: 23 U/L (ref 1–32)
BACTERIA UR QL AUTO: ABNORMAL /HPF
BASOPHILS # BLD AUTO: 0.08 10*3/MM3 (ref 0–0.2)
BASOPHILS NFR BLD AUTO: 0.6 % (ref 0–1.5)
BH CV ECHO MEAS - AO MAX PG: 29.2 MMHG
BH CV ECHO MEAS - AO MEAN PG: 16 MMHG
BH CV ECHO MEAS - AO ROOT DIAM: 3.1 CM
BH CV ECHO MEAS - AO V2 MAX: 270 CM/SEC
BH CV ECHO MEAS - AO V2 VTI: 60.8 CM
BH CV ECHO MEAS - AVA(I,D): 1.1 CM2
BH CV ECHO MEAS - EDV(CUBED): 97.3 ML
BH CV ECHO MEAS - EDV(MOD-SP2): 63.5 ML
BH CV ECHO MEAS - EDV(MOD-SP4): 84.4 ML
BH CV ECHO MEAS - EF(MOD-BP): 40.5 %
BH CV ECHO MEAS - EF(MOD-SP2): 42.5 %
BH CV ECHO MEAS - EF(MOD-SP4): 37.6 %
BH CV ECHO MEAS - ESV(CUBED): 46.7 ML
BH CV ECHO MEAS - ESV(MOD-SP2): 36.5 ML
BH CV ECHO MEAS - ESV(MOD-SP4): 52.7 ML
BH CV ECHO MEAS - FS: 21.7 %
BH CV ECHO MEAS - IVS/LVPW: 1 CM
BH CV ECHO MEAS - IVSD: 1 CM
BH CV ECHO MEAS - LA DIMENSION: 4.6 CM
BH CV ECHO MEAS - LAT PEAK E' VEL: 6.7 CM/SEC
BH CV ECHO MEAS - LV MASS(C)D: 158.8 GRAMS
BH CV ECHO MEAS - LV MAX PG: 3.9 MMHG
BH CV ECHO MEAS - LV MEAN PG: 2 MMHG
BH CV ECHO MEAS - LV V1 MAX: 98.8 CM/SEC
BH CV ECHO MEAS - LV V1 VTI: 21.4 CM
BH CV ECHO MEAS - LVIDD: 4.6 CM
BH CV ECHO MEAS - LVIDS: 3.6 CM
BH CV ECHO MEAS - LVOT AREA: 3.1 CM2
BH CV ECHO MEAS - LVOT DIAM: 2 CM
BH CV ECHO MEAS - LVPWD: 1 CM
BH CV ECHO MEAS - MED PEAK E' VEL: 5.6 CM/SEC
BH CV ECHO MEAS - MV A MAX VEL: 128 CM/SEC
BH CV ECHO MEAS - MV DEC SLOPE: 499 CM/SEC2
BH CV ECHO MEAS - MV DEC TIME: 0.14 SEC
BH CV ECHO MEAS - MV E MAX VEL: 93.8 CM/SEC
BH CV ECHO MEAS - MV E/A: 0.73
BH CV ECHO MEAS - MV MAX PG: 6.5 MMHG
BH CV ECHO MEAS - MV MEAN PG: 3 MMHG
BH CV ECHO MEAS - MV P1/2T: 61 MSEC
BH CV ECHO MEAS - MV V2 VTI: 36.4 CM
BH CV ECHO MEAS - MVA(P1/2T): 3.6 CM2
BH CV ECHO MEAS - MVA(VTI): 1.84 CM2
BH CV ECHO MEAS - PA ACC TIME: 0.08 SEC
BH CV ECHO MEAS - PA V2 MAX: 95.7 CM/SEC
BH CV ECHO MEAS - PI END-D VEL: 97.4 CM/SEC
BH CV ECHO MEAS - RAP SYSTOLE: 3 MMHG
BH CV ECHO MEAS - RVSP: 26 MMHG
BH CV ECHO MEAS - SV(LVOT): 67.2 ML
BH CV ECHO MEAS - SV(MOD-SP2): 27 ML
BH CV ECHO MEAS - SV(MOD-SP4): 31.7 ML
BH CV ECHO MEAS - TAPSE (>1.6): 2.5 CM
BH CV ECHO MEAS - TR MAX PG: 23 MMHG
BH CV ECHO MEAS - TR MAX VEL: 239.5 CM/SEC
BH CV ECHO MEASUREMENTS AVERAGE E/E' RATIO: 15.25
BH CV VAS BP LEFT ARM: NORMAL MMHG
BH CV XLRA - RV BASE: 3.6 CM
BH CV XLRA - RV LENGTH: 6.4 CM
BH CV XLRA - RV MID: 2.4 CM
BH CV XLRA - TDI S': 11.9 CM/SEC
BILIRUB SERPL-MCNC: 0.8 MG/DL (ref 0–1.2)
BILIRUB UR QL STRIP: NEGATIVE
BUN BLDA-MCNC: 22 MG/DL (ref 8–26)
BUN SERPL-MCNC: 22 MG/DL (ref 8–23)
BUN/CREAT SERPL: 14.6 (ref 7–25)
CA-I BLDA-SCNC: 1.2 MMOL/L (ref 1.2–1.32)
CALCIUM SPEC-SCNC: 8.9 MG/DL (ref 8.6–10.5)
CHLORIDE BLDA-SCNC: 106 MMOL/L (ref 98–109)
CHLORIDE SERPL-SCNC: 101 MMOL/L (ref 98–107)
CLARITY UR: CLEAR
CO2 BLDA-SCNC: 23 MMOL/L (ref 24–29)
CO2 SERPL-SCNC: 21 MMOL/L (ref 22–29)
COLOR UR: YELLOW
CREAT BLDA-MCNC: 1.6 MG/DL (ref 0.6–1.3)
CREAT SERPL-MCNC: 1.51 MG/DL (ref 0.57–1)
DEPRECATED RDW RBC AUTO: 48 FL (ref 37–54)
EGFRCR SERPLBLD CKD-EPI 2021: 30.7 ML/MIN/1.73
EGFRCR SERPLBLD CKD-EPI 2021: 32.9 ML/MIN/1.73
EOSINOPHIL # BLD AUTO: 0.09 10*3/MM3 (ref 0–0.4)
EOSINOPHIL NFR BLD AUTO: 0.6 % (ref 0.3–6.2)
ERYTHROCYTE [DISTWIDTH] IN BLOOD BY AUTOMATED COUNT: 13.4 % (ref 12.3–15.4)
GEN 5 2HR TROPONIN T REFLEX: 59 NG/L
GLOBULIN UR ELPH-MCNC: 3.4 GM/DL
GLUCOSE BLDC GLUCOMTR-MCNC: 107 MG/DL (ref 70–130)
GLUCOSE BLDC GLUCOMTR-MCNC: 112 MG/DL (ref 70–130)
GLUCOSE BLDC GLUCOMTR-MCNC: 118 MG/DL (ref 70–130)
GLUCOSE SERPL-MCNC: 109 MG/DL (ref 65–99)
GLUCOSE UR STRIP-MCNC: ABNORMAL MG/DL
HCT VFR BLD AUTO: 35.1 % (ref 34–46.6)
HCT VFR BLDA CALC: 34 % (ref 38–51)
HGB BLD-MCNC: 11.8 G/DL (ref 12–15.9)
HGB BLDA-MCNC: 11.6 G/DL (ref 12–17)
HGB UR QL STRIP.AUTO: ABNORMAL
HOLD SPECIMEN: NORMAL
HYALINE CASTS UR QL AUTO: ABNORMAL /LPF
IMM GRANULOCYTES # BLD AUTO: 0.05 10*3/MM3 (ref 0–0.05)
IMM GRANULOCYTES NFR BLD AUTO: 0.3 % (ref 0–0.5)
INR PPP: 1.2 (ref 0.8–1.2)
KETONES UR QL STRIP: NEGATIVE
LEFT ATRIUM VOLUME INDEX: 50 ML/M2
LEUKOCYTE ESTERASE UR QL STRIP.AUTO: ABNORMAL
LYMPHOCYTES # BLD AUTO: 2.4 10*3/MM3 (ref 0.7–3.1)
LYMPHOCYTES NFR BLD AUTO: 16.7 % (ref 19.6–45.3)
MCH RBC QN AUTO: 32.5 PG (ref 26.6–33)
MCHC RBC AUTO-ENTMCNC: 33.6 G/DL (ref 31.5–35.7)
MCV RBC AUTO: 96.7 FL (ref 79–97)
MONOCYTES # BLD AUTO: 0.99 10*3/MM3 (ref 0.1–0.9)
MONOCYTES NFR BLD AUTO: 6.9 % (ref 5–12)
NEUTROPHILS NFR BLD AUTO: 10.76 10*3/MM3 (ref 1.7–7)
NEUTROPHILS NFR BLD AUTO: 74.9 % (ref 42.7–76)
NITRITE UR QL STRIP: NEGATIVE
NRBC BLD AUTO-RTO: 0 /100 WBC (ref 0–0.2)
PH UR STRIP.AUTO: 7.5 [PH] (ref 5–8)
PLATELET # BLD AUTO: 203 10*3/MM3 (ref 140–450)
PMV BLD AUTO: 10.5 FL (ref 6–12)
POTASSIUM BLDA-SCNC: 4.2 MMOL/L (ref 3.5–4.9)
POTASSIUM SERPL-SCNC: 4.2 MMOL/L (ref 3.5–5.2)
PROT SERPL-MCNC: 7.2 G/DL (ref 6–8.5)
PROT UR QL STRIP: NEGATIVE
PROTHROMBIN TIME: 14.5 SECONDS (ref 12.8–15.2)
RBC # BLD AUTO: 3.63 10*6/MM3 (ref 3.77–5.28)
RBC # UR STRIP: ABNORMAL /HPF
REF LAB TEST METHOD: ABNORMAL
SODIUM BLD-SCNC: 138 MMOL/L (ref 138–146)
SODIUM SERPL-SCNC: 136 MMOL/L (ref 136–145)
SP GR UR STRIP: 1.02 (ref 1–1.03)
SQUAMOUS #/AREA URNS HPF: ABNORMAL /HPF
TROPONIN T DELTA: 5 NG/L
TROPONIN T SERPL HS-MCNC: 54 NG/L
UROBILINOGEN UR QL STRIP: ABNORMAL
WBC # UR STRIP: ABNORMAL /HPF
WBC NRBC COR # BLD AUTO: 14.37 10*3/MM3 (ref 3.4–10.8)
WHOLE BLOOD HOLD COAG: NORMAL
WHOLE BLOOD HOLD SPECIMEN: NORMAL

## 2024-07-22 PROCEDURE — 92523 SPEECH SOUND LANG COMPREHEN: CPT

## 2024-07-22 PROCEDURE — G0378 HOSPITAL OBSERVATION PER HR: HCPCS

## 2024-07-22 PROCEDURE — P9612 CATHETERIZE FOR URINE SPEC: HCPCS

## 2024-07-22 PROCEDURE — 85610 PROTHROMBIN TIME: CPT | Performed by: EMERGENCY MEDICINE

## 2024-07-22 PROCEDURE — 85730 THROMBOPLASTIN TIME PARTIAL: CPT | Performed by: EMERGENCY MEDICINE

## 2024-07-22 PROCEDURE — 70551 MRI BRAIN STEM W/O DYE: CPT

## 2024-07-22 PROCEDURE — 93306 TTE W/DOPPLER COMPLETE: CPT

## 2024-07-22 PROCEDURE — 99223 1ST HOSP IP/OBS HIGH 75: CPT | Performed by: HOSPITALIST

## 2024-07-22 PROCEDURE — 87186 SC STD MICRODIL/AGAR DIL: CPT | Performed by: EMERGENCY MEDICINE

## 2024-07-22 PROCEDURE — 93306 TTE W/DOPPLER COMPLETE: CPT | Performed by: INTERNAL MEDICINE

## 2024-07-22 PROCEDURE — 82948 REAGENT STRIP/BLOOD GLUCOSE: CPT

## 2024-07-22 PROCEDURE — 93005 ELECTROCARDIOGRAM TRACING: CPT | Performed by: EMERGENCY MEDICINE

## 2024-07-22 PROCEDURE — 25510000001 IOPAMIDOL PER 1 ML: Performed by: EMERGENCY MEDICINE

## 2024-07-22 PROCEDURE — 25010000002 CEFTRIAXONE PER 250 MG: Performed by: HOSPITALIST

## 2024-07-22 PROCEDURE — 87088 URINE BACTERIA CULTURE: CPT | Performed by: EMERGENCY MEDICINE

## 2024-07-22 PROCEDURE — 85025 COMPLETE CBC W/AUTO DIFF WBC: CPT | Performed by: EMERGENCY MEDICINE

## 2024-07-22 PROCEDURE — 81001 URINALYSIS AUTO W/SCOPE: CPT | Performed by: EMERGENCY MEDICINE

## 2024-07-22 PROCEDURE — 99222 1ST HOSP IP/OBS MODERATE 55: CPT

## 2024-07-22 PROCEDURE — 71045 X-RAY EXAM CHEST 1 VIEW: CPT

## 2024-07-22 PROCEDURE — 70496 CT ANGIOGRAPHY HEAD: CPT

## 2024-07-22 PROCEDURE — 84484 ASSAY OF TROPONIN QUANT: CPT | Performed by: EMERGENCY MEDICINE

## 2024-07-22 PROCEDURE — 74176 CT ABD & PELVIS W/O CONTRAST: CPT

## 2024-07-22 PROCEDURE — 0042T HC CT CEREBRAL PERFUSION W/WO CONTRAST: CPT

## 2024-07-22 PROCEDURE — 87040 BLOOD CULTURE FOR BACTERIA: CPT | Performed by: HOSPITALIST

## 2024-07-22 PROCEDURE — 85014 HEMATOCRIT: CPT | Performed by: EMERGENCY MEDICINE

## 2024-07-22 PROCEDURE — 70450 CT HEAD/BRAIN W/O DYE: CPT

## 2024-07-22 PROCEDURE — 80047 BASIC METABLC PNL IONIZED CA: CPT | Performed by: EMERGENCY MEDICINE

## 2024-07-22 PROCEDURE — 36415 COLL VENOUS BLD VENIPUNCTURE: CPT

## 2024-07-22 PROCEDURE — 87086 URINE CULTURE/COLONY COUNT: CPT | Performed by: EMERGENCY MEDICINE

## 2024-07-22 PROCEDURE — 99285 EMERGENCY DEPT VISIT HI MDM: CPT

## 2024-07-22 PROCEDURE — 70498 CT ANGIOGRAPHY NECK: CPT

## 2024-07-22 PROCEDURE — 4A03X5D MEASUREMENT OF ARTERIAL FLOW, INTRACRANIAL, EXTERNAL APPROACH: ICD-10-PCS | Performed by: RADIOLOGY

## 2024-07-22 PROCEDURE — 80053 COMPREHEN METABOLIC PANEL: CPT | Performed by: EMERGENCY MEDICINE

## 2024-07-22 PROCEDURE — 25010000002 HEPARIN (PORCINE) PER 1000 UNITS: Performed by: HOSPITALIST

## 2024-07-22 PROCEDURE — 84484 ASSAY OF TROPONIN QUANT: CPT | Performed by: HOSPITALIST

## 2024-07-22 RX ORDER — HYDROXYZINE HYDROCHLORIDE 25 MG/1
25 TABLET, FILM COATED ORAL 3 TIMES DAILY PRN
Status: DISCONTINUED | OUTPATIENT
Start: 2024-07-22 | End: 2024-07-26 | Stop reason: HOSPADM

## 2024-07-22 RX ORDER — SODIUM CHLORIDE 0.9 % (FLUSH) 0.9 %
10 SYRINGE (ML) INJECTION EVERY 12 HOURS SCHEDULED
Status: DISCONTINUED | OUTPATIENT
Start: 2024-07-22 | End: 2024-07-26 | Stop reason: HOSPADM

## 2024-07-22 RX ORDER — CARVEDILOL 12.5 MG/1
12.5 TABLET ORAL 2 TIMES DAILY WITH MEALS
Status: DISCONTINUED | OUTPATIENT
Start: 2024-07-22 | End: 2024-07-26 | Stop reason: HOSPADM

## 2024-07-22 RX ORDER — ONDANSETRON 4 MG/1
4 TABLET, ORALLY DISINTEGRATING ORAL EVERY 6 HOURS PRN
Status: DISCONTINUED | OUTPATIENT
Start: 2024-07-22 | End: 2024-07-26 | Stop reason: HOSPADM

## 2024-07-22 RX ORDER — ATORVASTATIN CALCIUM 40 MG/1
80 TABLET, FILM COATED ORAL NIGHTLY
Status: DISCONTINUED | OUTPATIENT
Start: 2024-07-22 | End: 2024-07-26 | Stop reason: HOSPADM

## 2024-07-22 RX ORDER — NITROGLYCERIN 0.4 MG/1
0.4 TABLET SUBLINGUAL
Status: DISCONTINUED | OUTPATIENT
Start: 2024-07-22 | End: 2024-07-26 | Stop reason: HOSPADM

## 2024-07-22 RX ORDER — CLOPIDOGREL BISULFATE 75 MG/1
300 TABLET ORAL ONCE
Status: COMPLETED | OUTPATIENT
Start: 2024-07-22 | End: 2024-07-22

## 2024-07-22 RX ORDER — SODIUM CHLORIDE 0.9 % (FLUSH) 0.9 %
10 SYRINGE (ML) INJECTION AS NEEDED
Status: DISCONTINUED | OUTPATIENT
Start: 2024-07-22 | End: 2024-07-26 | Stop reason: HOSPADM

## 2024-07-22 RX ORDER — CLOPIDOGREL BISULFATE 75 MG/1
75 TABLET ORAL DAILY
Status: DISCONTINUED | OUTPATIENT
Start: 2024-07-23 | End: 2024-07-26 | Stop reason: HOSPADM

## 2024-07-22 RX ORDER — BISACODYL 5 MG/1
5 TABLET, DELAYED RELEASE ORAL DAILY PRN
Status: DISCONTINUED | OUTPATIENT
Start: 2024-07-22 | End: 2024-07-26 | Stop reason: HOSPADM

## 2024-07-22 RX ORDER — SODIUM CHLORIDE 9 MG/ML
40 INJECTION, SOLUTION INTRAVENOUS AS NEEDED
Status: DISCONTINUED | OUTPATIENT
Start: 2024-07-22 | End: 2024-07-26 | Stop reason: HOSPADM

## 2024-07-22 RX ORDER — ONDANSETRON 2 MG/ML
4 INJECTION INTRAMUSCULAR; INTRAVENOUS EVERY 6 HOURS PRN
Status: DISCONTINUED | OUTPATIENT
Start: 2024-07-22 | End: 2024-07-26 | Stop reason: HOSPADM

## 2024-07-22 RX ORDER — ONDANSETRON 4 MG/1
4 TABLET, ORALLY DISINTEGRATING ORAL EVERY 8 HOURS PRN
Status: DISCONTINUED | OUTPATIENT
Start: 2024-07-22 | End: 2024-07-22

## 2024-07-22 RX ORDER — DOXYCYCLINE 100 MG/1
100 CAPSULE ORAL EVERY 12 HOURS SCHEDULED
Status: DISCONTINUED | OUTPATIENT
Start: 2024-07-22 | End: 2024-07-26 | Stop reason: HOSPADM

## 2024-07-22 RX ORDER — GUAIFENESIN 600 MG/1
600 TABLET, EXTENDED RELEASE ORAL EVERY 12 HOURS SCHEDULED
Status: DISCONTINUED | OUTPATIENT
Start: 2024-07-22 | End: 2024-07-26 | Stop reason: HOSPADM

## 2024-07-22 RX ORDER — AMLODIPINE BESYLATE 2.5 MG/1
2.5 TABLET ORAL
Status: DISCONTINUED | OUTPATIENT
Start: 2024-07-22 | End: 2024-07-26 | Stop reason: HOSPADM

## 2024-07-22 RX ORDER — CETIRIZINE HYDROCHLORIDE 10 MG/1
10 TABLET ORAL DAILY
Status: DISCONTINUED | OUTPATIENT
Start: 2024-07-22 | End: 2024-07-26 | Stop reason: HOSPADM

## 2024-07-22 RX ORDER — LEVOTHYROXINE SODIUM 0.07 MG/1
75 TABLET ORAL DAILY
Status: DISCONTINUED | OUTPATIENT
Start: 2024-07-23 | End: 2024-07-26 | Stop reason: HOSPADM

## 2024-07-22 RX ORDER — ESTRADIOL 0.5 MG/1
0.5 TABLET ORAL DAILY
Status: DISCONTINUED | OUTPATIENT
Start: 2024-07-22 | End: 2024-07-26 | Stop reason: HOSPADM

## 2024-07-22 RX ORDER — BISACODYL 10 MG
10 SUPPOSITORY, RECTAL RECTAL DAILY PRN
Status: DISCONTINUED | OUTPATIENT
Start: 2024-07-22 | End: 2024-07-26 | Stop reason: HOSPADM

## 2024-07-22 RX ORDER — FUROSEMIDE 20 MG/1
20 TABLET ORAL DAILY
Status: DISCONTINUED | OUTPATIENT
Start: 2024-07-22 | End: 2024-07-26 | Stop reason: HOSPADM

## 2024-07-22 RX ORDER — MULTIPLE VITAMINS W/ MINERALS TAB 9MG-400MCG
1 TAB ORAL DAILY
Status: DISCONTINUED | OUTPATIENT
Start: 2024-07-23 | End: 2024-07-26 | Stop reason: HOSPADM

## 2024-07-22 RX ORDER — AMOXICILLIN 250 MG
2 CAPSULE ORAL 2 TIMES DAILY PRN
Status: DISCONTINUED | OUTPATIENT
Start: 2024-07-22 | End: 2024-07-26 | Stop reason: HOSPADM

## 2024-07-22 RX ORDER — SPIRONOLACTONE 25 MG/1
25 TABLET ORAL DAILY
Status: DISCONTINUED | OUTPATIENT
Start: 2024-07-22 | End: 2024-07-26 | Stop reason: HOSPADM

## 2024-07-22 RX ORDER — POLYETHYLENE GLYCOL 3350 17 G/17G
17 POWDER, FOR SOLUTION ORAL DAILY PRN
Status: DISCONTINUED | OUTPATIENT
Start: 2024-07-22 | End: 2024-07-26 | Stop reason: HOSPADM

## 2024-07-22 RX ORDER — MECLIZINE HYDROCHLORIDE 25 MG/1
25 TABLET ORAL EVERY 6 HOURS PRN
Status: DISCONTINUED | OUTPATIENT
Start: 2024-07-22 | End: 2024-07-26 | Stop reason: HOSPADM

## 2024-07-22 RX ORDER — ASPIRIN 300 MG/1
300 SUPPOSITORY RECTAL DAILY
Status: DISCONTINUED | OUTPATIENT
Start: 2024-07-22 | End: 2024-07-26 | Stop reason: HOSPADM

## 2024-07-22 RX ORDER — ASPIRIN 81 MG/1
81 TABLET, CHEWABLE ORAL DAILY
Status: DISCONTINUED | OUTPATIENT
Start: 2024-07-22 | End: 2024-07-26 | Stop reason: HOSPADM

## 2024-07-22 RX ORDER — HEPARIN SODIUM 5000 [USP'U]/ML
5000 INJECTION, SOLUTION INTRAVENOUS; SUBCUTANEOUS EVERY 8 HOURS SCHEDULED
Status: DISCONTINUED | OUTPATIENT
Start: 2024-07-22 | End: 2024-07-26 | Stop reason: HOSPADM

## 2024-07-22 RX ADMIN — IOPAMIDOL 125 ML: 755 INJECTION, SOLUTION INTRAVENOUS at 10:38

## 2024-07-22 RX ADMIN — SODIUM CHLORIDE 1000 MG: 900 INJECTION INTRAVENOUS at 13:31

## 2024-07-22 RX ADMIN — ATORVASTATIN CALCIUM 80 MG: 40 TABLET, FILM COATED ORAL at 20:18

## 2024-07-22 RX ADMIN — FUROSEMIDE 20 MG: 20 TABLET ORAL at 16:35

## 2024-07-22 RX ADMIN — GUAIFENESIN 600 MG: 600 TABLET, EXTENDED RELEASE ORAL at 20:18

## 2024-07-22 RX ADMIN — HEPARIN SODIUM 5000 UNITS: 5000 INJECTION INTRAVENOUS; SUBCUTANEOUS at 16:35

## 2024-07-22 RX ADMIN — AMLODIPINE BESYLATE 2.5 MG: 2.5 TABLET ORAL at 16:35

## 2024-07-22 RX ADMIN — CARVEDILOL 12.5 MG: 12.5 TABLET, FILM COATED ORAL at 18:32

## 2024-07-22 RX ADMIN — SPIRONOLACTONE 25 MG: 25 TABLET ORAL at 16:35

## 2024-07-22 RX ADMIN — CLOPIDOGREL BISULFATE 300 MG: 75 TABLET ORAL at 11:23

## 2024-07-22 RX ADMIN — Medication 10 ML: at 21:59

## 2024-07-22 RX ADMIN — ESTRADIOL 0.5 MG: 0.5 TABLET ORAL at 16:35

## 2024-07-22 RX ADMIN — ASPIRIN 81 MG CHEWABLE TABLET 81 MG: 81 TABLET CHEWABLE at 11:23

## 2024-07-22 RX ADMIN — SACUBITRIL AND VALSARTAN 1 TABLET: 49; 51 TABLET, FILM COATED ORAL at 21:58

## 2024-07-22 RX ADMIN — HEPARIN SODIUM 5000 UNITS: 5000 INJECTION INTRAVENOUS; SUBCUTANEOUS at 22:02

## 2024-07-22 RX ADMIN — CETIRIZINE HYDROCHLORIDE 10 MG: 10 TABLET, FILM COATED ORAL at 16:35

## 2024-07-22 RX ADMIN — DOXYCYCLINE 100 MG: 100 CAPSULE ORAL at 20:18

## 2024-07-22 NOTE — CASE MANAGEMENT/SOCIAL WORK
Discharge Planning Assessment  [facility]     Patient Name: Sylvia Jalloh  MRN: [id number]  Today's Date: 7/22/2024    Admit Date: 7/22/2024    Plan: Initial   Discharge Needs Assessment       Row Name 07/22/24 1100       Living Environment    People in Home alone;other (see comments)  Dtr & Granddtr reside close by    Current Living Arrangements home    Potentially Unsafe Housing Conditions none    In the past 12 months has the electric, gas, oil, or water company threatened to shut off services in your home? No    Primary Care Provided by self    Provides Primary Care For no one    Family Caregiver if Needed child(sukh), adult;grandchild(sukh), adult    Family Caregiver Names STEPHANIE ESCAMILLA Daughter [phone]    Quality of Family Relationships involved;helpful;supportive    Able to Return to Prior Arrangements yes       Resource/Environmental Concerns    Resource/Environmental Concerns none    Transportation Concerns no car       Transportation Needs    In the past 12 months, has lack of transportation kept you from medical appointments or from getting medications? no    In the past 12 months, has lack of transportation kept you from meetings, work, or from getting things needed for daily living? No       Food Insecurity    Within the past 12 months, you worried that your food would run out before you got the money to buy more. Never true    Within the past 12 months, the food you bought just didn't last and you didn't have money to get more. Never true       Transition Planning    Patient/Family Anticipates Transition to home with family    Patient/Family Anticipated Services at Transition     Transportation Anticipated family or friend will provide       Discharge Needs Assessment    Readmission Within the Last 30 Days no previous admission in last 30 days    Equipment Currently Used at Home bath bench;walker, rolling;pulse ox    Concerns to be Addressed discharge planning    Anticipated  Changes Related to Illness none    Current Discharge Risk lives alone                   Discharge Plan       Row Name 07/22/24 1107       Plan    Plan Initial    Plan Comments CM spoke with patient, daughter and granddaughter at bedside regarding DC planning. Patient resides in [facility], alone. Patient's family reside close by and check in on patient daily. Patient is independent with most ADL's, has a rolling walker, cane and bath bench in her home. Patient denies any current home health or outpatient services. Patient has medical insurance, prescription coverage and is able to afford/obtain medications without difficulty. Patient has no advanced directives. Discharge plans to be determined based on hospital course. Goal is home. CM will continue to follow    Final Discharge Disposition Code 30 - still a patient                  Continued Care and Services - Admitted Since 7/22/2024    No active coordination exists for this encounter.          Demographic Summary       Row Name 07/22/24 1058       General Information    Arrived From home    Referral Source emergency department    Reason for Consult discharge planning    Preferred Language English       Contact Information    Contact Information Comments STEPHANIE ESCAMILLA Daughter [phone]                   Functional Status       Row Name 07/22/24 1058       Functional Status    Usual Activity Tolerance good    Current Activity Tolerance moderate       Physical Activity    On average, how many days per week do you engage in moderate to strenuous exercise (like a brisk walk)? 0 days    On average, how many minutes do you engage in exercise at this level? 0 min    Number of minutes of exercise per week 0       Assessment of Health Literacy    How often do you have someone help you read hospital materials? Occasionally    How often do you have problems learning about your medical condition because of difficulty understanding written information? Occasionally    How  often do you have a problem understanding what is told to you about your medical condition? Occasionally    How confident are you filling out medical forms by yourself? Somewhat    Health Literacy Good       Functional Status, IADL    Medications independent    Meal Preparation independent    Housekeeping assistive person    Laundry independent    Shopping assistive person       Mental Status    General Appearance WDL WDL       Mental Status Summary    Recent Changes in Mental Status/Cognitive Functioning no changes       Employment/    Employment Status retired                   Psychosocial    No documentation.                  Abuse/Neglect    No documentation.                  Legal    No documentation.                  Substance Abuse    No documentation.                  Patient Forms    No documentation.                     Rosemarie Echols RN

## 2024-07-22 NOTE — ED NOTES
Sylvia Jalloh    Nursing Report ED to Floor:  Mental status: alert and oriented x 4  Ambulatory status: difficulty ambulating  Oxygen Therapy:  ra  Cardiac Rhythm: nsr  Admitted from: home  Safety Concerns:  fall risk  Social Issues: none  ED Room #:  23    ED Nurse Phone Extension - [phone] or may call [phone].      HPI:   Chief Complaint   Patient presents with    Blurred Vision       Past Medical History:  Past Medical History:   Diagnosis Date    Chronic kidney disease     Disease of thyroid gland     Gallstone pancreatitis 2015    Hypertension     Systolic heart failure secondary to idiopathic cardiomyopathy         Past Surgical History:  Past Surgical History:   Procedure Laterality Date    APPENDECTOMY      BLADDER SUSPENSION      CARDIAC CATHETERIZATION N/A 7/29/2017    Procedure: Coronary angiography;  Surgeon: Thiago Vega MD;  Location:  [facility];  Service:     CHOLECYSTECTOMY      HYSTERECTOMY          Admitting Doctor:   Arpit Monge MD    Consulting Provider(s):  Consults       Date and Time Order Name Status Description    7/22/2024  9:59 AM Inpatient Neurology Consult Stroke Completed              Admitting Diagnosis:   The primary encounter diagnosis was Double vision. A diagnosis of Ataxia was also pertinent to this visit.    Most Recent Vitals:   Vitals:    07/22/24 1146 07/22/24 1151 07/22/24 1200 07/22/24 1230   BP:   147/70 152/69   Pulse: 71 68 68 78   Resp:       Temp:       TempSrc:       SpO2: 98% 95% 96% 98%   Weight:       Height:           Active LDAs/IV Access:   Lines, Drains & Airways       Active LDAs       Name Placement date Placement time Site Days    Peripheral IV 07/22/24 0938 Left Antecubital 07/22/24  0938  Antecubital  less than 1    External Urinary Catheter 07/22/24  1152  --  less than 1                    Labs (abnormal labs have a star):   Labs Reviewed   SINGLE HS TROPONIN T - Abnormal; Notable for the following components:       Result Value     HS Troponin T 54 (*)     All other components within normal limits    Narrative:     High Sensitive Troponin T Reference Range:  <14.0 ng/L- Negative Female for AMI  <22.0 ng/L- Negative Male for AMI  >=14 - Abnormal Female indicating possible myocardial injury.  >=22 - Abnormal Male indicating possible myocardial injury.   Clinicians would have to utilize clinical acumen, EKG, Troponin, and serial changes to determine if it is an Acute Myocardial Infarction or myocardial injury due to an underlying chronic condition.        APTT - Abnormal; Notable for the following components:    PTT 44.4 (*)     All other components within normal limits    Narrative:     PTT = The equivalent PTT values for the therapeutic range of heparin levels at 0.3 to 0.5 U/ml are 60 to 70 seconds.   CBC WITH AUTO DIFFERENTIAL - Abnormal; Notable for the following components:    WBC 14.37 (*)     RBC 3.63 (*)     Hemoglobin 11.8 (*)     Lymphocyte % 16.7 (*)     Neutrophils, Absolute 10.76 (*)     Monocytes, Absolute 0.99 (*)     All other components within normal limits   URINALYSIS W/ CULTURE IF INDICATED - Abnormal; Notable for the following components:    Glucose,  mg/dL (1+) (*)     Blood, UA Trace (*)     Leuk Esterase, UA Moderate (2+) (*)     All other components within normal limits    Narrative:     In absence of clinical symptoms, the presence of pyuria, bacteria, and/or nitrites on the urinalysis result does not correlate with infection.   URINALYSIS, MICROSCOPIC ONLY - Abnormal; Notable for the following components:    WBC, UA 21-50 (*)     Bacteria, UA 3+ (*)     All other components within normal limits   POCT CHEM 8 - Abnormal; Notable for the following components:    Creatinine 1.60 (*)     Total CO2 23 (*)     Hemoglobin 11.6 (*)     Hematocrit 34 (*)     eGFR 30.7 (*)     All other components within normal limits   AST - Normal   ALT - Normal   POCT PROTIME - INR - Normal   URINE CULTURE   BLOOD CULTURE   BLOOD  CULTURE   RAINBOW DRAW    Narrative:     The following orders were created for panel order [place] Draw.  Procedure                               Abnormality         Status                     ---------                               -----------         ------                     Green Top (Gel)[[id number]]                                  Final result               Lavender Top[[id number]]                                     Final result               Gold Top - SST[[id number]]                                   Final result               Man Top[[id number]]                                         Final result               Light Blue Top[[id number]]                                   Final result                 Please view results for these tests on the individual orders.   HIGH SENSITIVITIY TROPONIN T 2HR   COMPREHENSIVE METABOLIC PANEL   CBC AND DIFFERENTIAL    Narrative:     The following orders were created for panel order CBC & Differential.  Procedure                               Abnormality         Status                     ---------                               -----------         ------                     CBC Auto Differential[[id number]]        Abnormal            Final result                 Please view results for these tests on the individual orders.   GREEN TOP   LAVENDER TOP   GOLD TOP - SST   GRAY TOP   LIGHT BLUE TOP       Meds Given in ED:   Medications   sodium chloride 0.9 % flush 10 mL (has no administration in time range)   aspirin chewable tablet 81 mg (81 mg Oral Given 7/22/24 1123)     Or   aspirin suppository 300 mg ( Rectal Not Given:  See Alt 7/22/24 1123)   clopidogrel (PLAVIX) tablet 300 mg (300 mg Oral Given 7/22/24 1123)     And   clopidogrel (PLAVIX) tablet 75 mg (has no administration in time range)   cefTRIAXone (ROCEPHIN) 1,000 mg in sodium chloride 0.9 % 100 mL MBP (has no administration in time range)   iopamidol (ISOVUE-370) 76 % injection 150 mL (125 mL Intravenous Given  7/22/24 1038)           Last NIH score:     1a. Level of Consciousness: 0-->Alert, keenly responsive  1b. LOC Questions: 0-->Answers both questions correctly  1c. LOC Commands: 0-->Performs both tasks correctly  2. Best Gaze: 0-->Normal  3. Visual: 1-->Partial hemianopia  4. Facial Palsy: 0-->Normal symmetrical movements  5a. Motor Arm, Left: 0-->No drift, limb holds 90 (or 45) degrees for full 10 secs  5b. Motor Arm, Right: 0-->No drift, limb holds 90 (or 45) degrees for full 10 secs  6a. Motor Leg, Left: 0-->No drift, leg holds 30 degree position for full 5 secs  6b. Motor Leg, Right: 0-->No drift, leg holds 30 degree position for full 5 secs  7. Limb Ataxia: 0-->Absent  8. Sensory: 0-->Normal, no sensory loss  9. Best Language: 0-->No aphasia, normal  10. Dysarthria: 0-->Normal  11. Extinction and Inattention (formerly Neglect): 0-->No abnormality    Total (NIH Stroke Scale): 1     Dysphagia screening results:  Patient Factors Component (Dysphagia:Stroke or Rule-out)  Best Eye Response: 4-->(E4) spontaneous (07/22/24 1120)  Best Motor Response: 6-->(M6) obeys commands (07/22/24 1120)  Best Verbal Response: 5-->(V5) oriented (07/22/24 1120)  [place] Coma Scale Score: 15 (07/22/24 1120)  Is there Facial Asymmetry/Weakness?: No (07/22/24 1120)  Is there Tongue Asymmetry/Weakness?: No (07/22/24 1120)  Is there Palatal Asymmetry/Weakness?: No (07/22/24 1120)  Patient Assessment Result: Pass - Proceed to Water Test (07/22/24 1120)     [place] Coma Scale:  No data recorded     CIWA:        Restraint Type:            Isolation Status:  No active isolations

## 2024-07-22 NOTE — CONSULTS
Stroke Consult Note    Patient Name: Sylvia Jalloh      MRN: [id number]  Age: 89 y.o.     Sex: female     : 1934  Primary Care Physician: Bart Pop APRN  Referring Physician: Dr. Miramontes, [facility] ED  Handedness: Right  Race:   Time Stroke Team Called: 0951        Time Patient Seen: 0954  Chief Complaint/Reason for Consultation: Blurry vision    HPI  Last Known Normal Date/Time: 2200 on 2024     This patient is an 89-year-old female with past medical history significant for hypertension, hypothyroid, chronic kidney disease, and heart failure who presented to [facility] ED with concern for acute onset blurry vision first noticed when she woke this morning at approximately 0700.  Last known well approximately 2200 on  when the patient went to bed.  Of note, family at bedside states patient was treated at OSH approximately 9 days ago for UTI and recently completed course of antibiotics.  Presenting symptom at that time was back pain.  Family reports patient had difficulty walking with persistent back pain on Friday.  Family confirms visual deficits are new this morning.  On my exam, patient is NIH 1 for visual field deficits, primarily in the bilateral superior quadrants.  Patient endorses right-sided weakness but no gross deficits are appreciated on exam.  Code stroke CTh with no evidence of acute intracranial abnormality.  CT perfusion with no evidence of core infarct or significant territorial ischemic tissue at risk.  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  Case immediately discussed with Dr. Jara.  Patient was deemed not an appropriate candidate for wake-up MRI protocol in the setting of her advanced age, low NIH, and questionable timing of associated symptoms.  Patient was deemed not a candidate for IV thrombolytic therapy or emergent neurointervention.  Patient will be  admitted to hospital medicine service for ongoing management and further stroke workup.    Of additional note, family at bedside reported patient might be on Eliquis but they are unsure.  On my chart review, I find no documentation of the patient previously being on Eliquis.  Confirmed with ED pharmacist that the patient's records show no prior history of filling prescription for Eliquis or any other anticoagulant.    Review of Systems   Constitutional:  Negative for chills and fatigue.   HENT:  Negative for trouble swallowing.    Eyes:  Positive for visual disturbance.   Respiratory:  Negative for shortness of breath.    Cardiovascular:  Negative for chest pain.   Gastrointestinal:  Negative for abdominal pain.   Musculoskeletal:  Negative for myalgias.   Neurological:  Positive for weakness. Negative for tremors, facial asymmetry, speech difficulty, light-headedness, numbness and headaches.   Psychiatric/Behavioral:  Negative for behavioral problems. The patient is not nervous/anxious.       Objective  Neurological Exam  Mental Status  Alert. Oriented to person, place, time and situation. Speech is normal. Language is fluent with no aphasia. Attention and concentration are normal. Fund of knowledge is appropriate for level of education.    Cranial Nerves  CN II: Patient reports blurry vision in all fields, difficulty identifying objects in bilateral superior quadrants.  CN III, IV, VI: Extraocular movements intact bilaterally. Pupils equal round and reactive to light bilaterally.  CN V:  Right: Facial sensation is normal.  Left: Facial sensation is normal on the left.  CN VII:  Right: There is no facial weakness.  Left: There is no facial weakness.  CN VIII: Hearing grossly intact bilaterally.  CN IX, X: Palate elevates symmetrically  CN XII: Tongue midline without atrophy or fasciculations.    Motor  Normal muscle bulk throughout. Normal muscle tone.  Strength at least 4/5 in all extremities with no gross  unilateral deficits appreciated.    Sensory  Light touch is normal in upper and lower extremities.     Coordination  Right: Finger-to-nose normal. Rapid alternating movement normal.Left: Finger-to-nose normal. Rapid alternating movement normal.    Physical Exam  Constitutional:       General: She is not in acute distress.  HENT:      Head: Normocephalic and atraumatic.      Mouth/Throat:      Mouth: Mucous membranes are moist.   Eyes:      Extraocular Movements: Extraocular movements intact.      Pupils: Pupils are equal, round, and reactive to light.   Cardiovascular:      Rate and Rhythm: Normal rate and regular rhythm.   Pulmonary:      Effort: Pulmonary effort is normal.   Musculoskeletal:         General: No swelling or deformity.   Skin:     General: Skin is warm and dry.   Neurological:      Mental Status: She is alert.   Psychiatric:         Mood and Affect: Mood normal.         Speech: Speech normal.         Behavior: Behavior normal.     Temp:  [97.5 °F (36.4 °C)] 97.5 °F (36.4 °C)  Heart Rate:  [76] 76  Resp:  [16] 16  BP: (173)/(78) 173/78    Past Medical History:   Diagnosis Date    Chronic kidney disease     Disease of thyroid gland     Gallstone pancreatitis 2015    Hypertension     Systolic heart failure secondary to idiopathic cardiomyopathy      Past Surgical History:   Procedure Laterality Date    APPENDECTOMY      BLADDER SUSPENSION      CARDIAC CATHETERIZATION N/A 7/29/2017    Procedure: Coronary angiography;  Surgeon: Thiago Vega MD;  Location: [facility] INVASIVE LOCATION;  Service:     CHOLECYSTECTOMY      HYSTERECTOMY       Family History   Problem Relation Age of Onset    Heart disease Mother     Heart disease Father     Early death Sister     Heart disease Sister     Heart disease Brother     Cancer Daughter     Heart disease Maternal Grandmother      Social History     Socioeconomic History    Marital status:    Tobacco Use    Smoking status: Never    Smokeless tobacco: Never    Substance and Sexual Activity    Alcohol use: No    Drug use: No    Sexual activity: Defer     Allergies   Allergen Reactions    Ace Inhibitors Rash    Latex Rash     Prior to Admission medications    Medication Sig Start Date End Date Taking? Authorizing Provider   AMLODIPINE BESYLATE PO Take 2.5 mg by mouth 2 (Two) Times a Day. Per patient, take two tabs daily     Meir Schneider MD   carvedilol (COREG) 12.5 MG tablet Take 12.5 mg by mouth 2 (Two) Times a Day With Meals.    Meir Schneider MD   cetirizine (zyrTEC) 10 MG tablet Take 10 mg by mouth Daily.    Meir Schneider MD   ENTRESTO 49-51 MG tablet Take 1 tablet by mouth 2 (Two) Times a Day. 7/8/20   Thiago Vega MD   estradiol (ESTRACE) 1 MG tablet Take 1 mg by mouth Daily. Taking 1/2 tablet qd    Meir Schneider MD   furosemide (LASIX) 20 MG tablet Take 20 mg by mouth Daily.    Meir Schneider MD   hydrOXYzine (ATARAX) 25 MG tablet Take 1 tablet by mouth 3 (Three) Times a Day As Needed for Anxiety. 11/25/18   Julian Miramontes MD   levothyroxine (SYNTHROID, LEVOTHROID) 75 MCG tablet Take 75 mcg by mouth Daily.    Meir Schneider MD   meclizine (ANTIVERT) 25 MG tablet Take 1 tablet by mouth Every 6 (Six) Hours As Needed for dizziness. 11/25/18   Julian Miramontes MD   Multiple Vitamins-Minerals (MULTIVITAMIN ADULTS 50+) tablet Take 1 tablet by mouth Daily.    Meir Schneider MD   ondansetron ODT (ZOFRAN-ODT) 4 MG disintegrating tablet Take 1 tablet by mouth Every 8 (Eight) Hours As Needed for Nausea or Vomiting. 11/25/18   Julian Miramontes MD   spironolactone (ALDACTONE) 25 MG tablet Take 1 tablet by mouth Daily. 9/25/17   Donny Xavier MD   Vitamin D, Cholecalciferol, 1000 UNITS capsule Take 1,000 Units by mouth Daily.    Meir Schneider MD     Acute Stroke Data  Alteplase (tPA) Inclusion / Exclusion Criteria  Person Administering Scale: Yariel Toth PA-C    Inclusion Criteria  [x]   18 years of age  or greater   []   Onset of symptoms < 4.5 hours before beginning treatment (stroke onset = time patient was last seen well or without symptoms).   []   Diagnosis of acute ischemic stroke causing measurable disabling deficit (Complete Hemianopia, Any Aphasia, Visual or Sensory Extinction, Any weakness limiting sustained effort against gravity)   []   Any remaining deficit considered potentially disabling in view of patient and practitioner   Exclusion criteria (Do not proceed with Alteplase if any are checked under exclusion criteria)  [x]   Onset unknown or GREATER than 4.5 hours   []   ICH on CT/MRI   []   CT demonstrates hypodensity representing acute or subacute infarct   []   Significant head trauma or prior stroke in the previous 3 months   []   Symptoms suggestive of subarachnoid hemorrhage   []   History of un-ruptured intracranial aneurysm GREATER than 10 mm   []   Recent intracranial or intraspinal surgery within the last 3 months   []   Arterial puncture at a non-compressible site in the previous 7 days   []   Active internal bleeding   []   Acute bleeding tendency   []   Platelet count LESS than 100,000 for known hematological diseases such as leukemia, thrombocytopenia or chronic cirrhosis   []   Current use of anticoagulant with INR GREATER than 1.7 or PT GREATER than 15 seconds, aPTT GREATER than 40 seconds   []   Heparin received within 48 hours, resulting in abnormally elevated aPTT GREATER than upper limit of normal   []   Current use of direct thrombin inhibitors or direct factor Xa inhibitors in the past 48 hours   []   Elevated blood pressure refractory to treatment (systolic GREATER than 185 mm/Hg or diastolic  GREATER than 110 mm/Hg   []   Suspected infective endocarditis and aortic arch dissection   []   Current use of therapeutic treatment dose of low-molecular-weight heparin (LMWH) within the previous 24 hours   []   Structural GI malignancy or bleed   Relative exclusion for all patients  []    Only minor non-disabling symptoms   []   Pregnancy   []   Seizure at onset with postictal residual neurological impairments   []   Major surgery or previous trauma within past 14 days   []   History of previous spontaneous ICH, intracranial neoplasm, or AV malformation   []   Postpartum (within previous 14 days)   []   Recent GI or urinary tract hemorrhage (within previous 21 days)   []   Recent acute MI (within previous 3 months)   []   History of un-ruptured intracranial aneurysm LESS than 10 mm   []   History of ruptured intracranial aneurysm   []   Blood glucose LESS than 50 mg/dL (2.7 mmol/L)   []   Dural puncture within the last 7 days   []   Known GREATER than 10 cerebral microbleeds   Additional exclusions for patients with symptoms onset between 3 and 4.5 hours.  []   Age > 80.   []   On any anticoagulants regardless of INR  >>> Warfarin (Coumadin), Heparin, Enoxaparin (Lovenox), fondaparinux (Arixtra), bivalirudin (Angiomax), Argatroban, dabigatran (Pradaxa), rivaroxaban (Xarelto), or apixaban (Eliquis)   []   Severe stroke (NIHSS > 25).   []   History of BOTH diabetes and previous ischemic stroke.   []   The risks and benefits have been discussed with the patient or family related to the administration of IV Alteplase for stroke symptoms.   []   I have discussed and reviewed the patient's case and imaging with the attending prior to IV Alteplase.   N/A Time Alteplase administered     MODIFIED KELVIN SCALE (to be assessed for each patient having history of stroke) []Stroke history but not assessed  []0: No symptoms at all  [x]1: No significant disability despite symptoms  []2: Slight disability  []3: Moderate disability  []4: Moderately severe disability  []5: Severe disability  []6: Death      NIH Stroke Scale  Time: 1005  Person Administering Scale: Yariel Toth PA-C    1a  Level of consciousness: 0=alert; keenly responsive   1b. LOC questions:  0=Performs both tasks correctly   1c. LOC commands:  0=Performs both tasks correctly   2.  Best Gaze: 0=normal   3.  Visual: 1=Partial hemianopia   4. Facial Palsy: 0=Normal symmetric movement   5a.  Motor left arm: 0=No drift, limb holds 90 (or 45) degrees for full 10 seconds   5b.  Motor right arm: 0=No drift, limb holds 90 (or 45) degrees for full 10 seconds   6a. motor left le=No drift, limb holds 90 (or 45) degrees for full 10 seconds   6b  Motor right le=No drift, limb holds 90 (or 45) degrees for full 10 seconds   7. Limb Ataxia: 0=Absent   8.  Sensory: 0=Normal; no sensory loss   9. Best Language:  0=No aphasia, normal   10. Dysarthria: 0=Normal   11. Extinction and Inattention: 0=No abnormality    Total:    1     Hospital Meds  Scheduled- aspirin, 81 mg, Oral, Daily   Or  aspirin, 300 mg, Rectal, Daily  clopidogrel, 300 mg, Oral, Once   And  [START ON 2024] clopidogrel, 75 mg, Oral, Daily    Infusions-     PRNs-   sodium chloride    Results Reviewed  I have personally reviewed current lab, radiology, and data and agree with results.    XR Chest 1 View    Result Date: 2024  Impression: 1. Small right pleural effusion with patchy bibasilar airspace disease. Electronically Signed: Domingo Mills MD  2024 10:58 AM EDT  Workstation ID: [id number]    CT Angiogram Head w AI Analysis of LVO    Result Date: 2024  1.No acute abnormality identified within the large arteries of the head or neck. 2.Focal plaque within the right proximal ICA with approximately 40 to 50% focal stenosis 1.5 cm beyond its origin. 3.No significant stenosis of the left internal carotid artery. 4.There is suggestion of a 2 mm saccular aneurysm seen about the junction of the right supraclinoid ICA/P-comm (series 5, images 237-238). 5.CT perfusion study demonstrates no territorial ischemia or core infarct. Electronically Signed: Swapnil Mcfarland MD  2024 10:50 AM EDT  Workstation ID: [id number]    CT Angiogram Neck    Result Date: 2024  1.No acute abnormality  identified within the large arteries of the head or neck. 2.Focal plaque within the right proximal ICA with approximately 40 to 50% focal stenosis 1.5 cm beyond its origin. 3.No significant stenosis of the left internal carotid artery. 4.There is suggestion of a 2 mm saccular aneurysm seen about the junction of the right supraclinoid ICA/P-comm (series 5, images 237-238). 5.CT perfusion study demonstrates no territorial ischemia or core infarct. Electronically Signed: Swapnil Mcfarland MD  7/22/2024 10:50 AM EDT  Workstation ID: [id number]    CT CEREBRAL PERFUSION WITH & WITHOUT CONTRAST    Result Date: 7/22/2024  1.No acute abnormality identified within the large arteries of the head or neck. 2.Focal plaque within the right proximal ICA with approximately 40 to 50% focal stenosis 1.5 cm beyond its origin. 3.No significant stenosis of the left internal carotid artery. 4.There is suggestion of a 2 mm saccular aneurysm seen about the junction of the right supraclinoid ICA/P-comm (series 5, images 237-238). 5.CT perfusion study demonstrates no territorial ischemia or core infarct. Electronically Signed: Swapnil Mcfarland MD  7/22/2024 10:50 AM EDT  Workstation ID: [id number]    CT Head Without Contrast Stroke Protocol    Result Date: 7/22/2024  Impression: Chronic appearing changes of the aging brain. No evidence of acute intracranial disease. Electronically Signed: Arpit Trevino MD  7/22/2024 10:38 AM EDT  Workstation ID: [id number]      Significant Labs  Sodium: 138  Creatinine: 1.60  Glucose: 112  WBC: 14.37  Hgb/HCT: 11.8/35.1  Platelets: 203    Assessment and Plan  This patient is an 89-year-old female with risk factors significant for HTN, hypothyroid, CKD, NICM/HF who presented to [facility] ED 7/22/2024 with concern for visual disturbance noted upon waking up this morning.  Initial NIH 1.  Code stroke CT imaging with no acute findings.  Patient was deemed not a candidate for IV thrombolytics or emergent  neurointervention.    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   -CT perfusion with no evidence of core infarct or significant territorial ischemic tissue at risk  -MRI brain without contrast pending  -Loaded with Plavix 300 mg, continue DAPT with Plavix 75 mg daily and aspirin 81 mg daily  -Allow permissive hypertension, SBP goal < 200  -NPO pending bedside swallow eval  -TTE pending  -LDL pending, start atorvastatin 80 mg nightly  -A1c pending  -Serial neurochecks per policy, stat Cth for any acute neurological change  -PT/OT/SLP as appropriate    Recent UTI  Leukocytosis  -Patient reportedly treated at [facility] for presenting symptom of back pain approximately 9 days ago and was H UTI   -Patient completed course of antibiotics  -Family reports patient had back pain and difficulty walking on Friday, but no visual deficits  -WBC at time of admission 14.37  -Further workup and management per emergency medicine and hospitalist teams    Disposition: Admit to hospital medicine      Case discussed with the patient, family at bedside, Dr. Miramontes, and Dr. Jara  Thank you for the consult. Stroke neurology will continue to follow.       Yariel Toth PA-C  [facility]

## 2024-07-22 NOTE — PLAN OF CARE
Goal Outcome Evaluation:  Plan of Care Reviewed With: patient, family                       SLP Diagnosis: functional speech/language skills, functional cognitive-linguistic skills (07/22/24 4248)

## 2024-07-22 NOTE — ED PROVIDER NOTES
"Subjective   History of Present Illness  Mrs Jalloh presents with visual trouble.  She tells me \"I cannot see\".  She reports that she woke up this way.  She tells me she was able to get up and walk to the bathroom but had a great deal of difficulty doing so.  She tells me she feels dizzy and off balance.  When I ask about any numbness or weakness in her arms and legs she tells me she feels weak in all extremities.      Review of Systems    Past Medical History:   Diagnosis Date    Chronic kidney disease     Disease of thyroid gland     Gallstone pancreatitis 2015    Hypertension     Systolic heart failure secondary to idiopathic cardiomyopathy        Allergies   Allergen Reactions    Ace Inhibitors Rash    Latex Rash       Past Surgical History:   Procedure Laterality Date    APPENDECTOMY      BLADDER SUSPENSION      CARDIAC CATHETERIZATION N/A 7/29/2017    Procedure: Coronary angiography;  Surgeon: Thiago Vega MD;  [facility];  Service:     CHOLECYSTECTOMY      HYSTERECTOMY         Family History   Problem Relation Age of Onset    Heart disease Mother     Heart disease Father     Early death Sister     Heart disease Sister     Heart disease Brother     Cancer Daughter     Heart disease Maternal Grandmother        Social History     Socioeconomic History    Marital status:    Tobacco Use    Smoking status: Never    Smokeless tobacco: Never   Substance and Sexual Activity    Alcohol use: No    Drug use: No    Sexual activity: Defer           Objective   Physical Exam  Vitals and nursing note reviewed.   Constitutional:       General: She is not in acute distress.     Appearance: Normal appearance.   HENT:      Head: Normocephalic and atraumatic.      Nose: Nose normal. No congestion or rhinorrhea.   Eyes:      General: No scleral icterus.     Conjunctiva/sclera: Conjunctivae normal.      Comments: Cannot get her to look downward w either eye, o/w EOM's are intact   Neck:     "  Comments: No JVD   Cardiovascular:      Rate and Rhythm: Normal rate and regular rhythm.      Heart sounds: No murmur heard.     No friction rub.   Pulmonary:      Effort: Pulmonary effort is normal.      Breath sounds: Normal breath sounds. No wheezing or rales.   Abdominal:      General: Bowel sounds are normal.      Palpations: Abdomen is soft.      Tenderness: There is no abdominal tenderness. There is no guarding or rebound.   Musculoskeletal:         General: No tenderness.      Cervical back: Normal range of motion and neck supple.      Right lower leg: No edema.      Left lower leg: No edema.   Skin:     General: Skin is warm and dry.      Coloration: Skin is not pale.      Findings: No erythema.   Neurological:      General: No focal deficit present.      Mental Status: She is alert and oriented to person, place, and time.      Motor: No weakness.      Coordination: Coordination normal.   Psychiatric:         Mood and Affect: Mood normal.         Behavior: Behavior normal.         Thought Content: Thought content normal.         Procedures           ED Course  ED Course as of 07/22/24 1534   Mon Jul 22, 2024   0952 Declared her code stroke.  Notified charge nurse.  Notified desk tech.  Not a candidate for consideration of thrombolysis as she woke up with symptoms. [DT]   0945 Discussed with Dallas from the stroke team who will see her. [DT]   0877 Spoke with Dallas from the stroke team.  He advises that CTA and CT perfusion are unremarkable.  They recommend admission and will obtain MRI.  She has completed her antibiotics that were prescribed at an outside hospital over a week ago.  Will obtain cath urine. [DT]   5695 Radiology has given final reports on her CT studies and chest x-ray.  There is possible infiltrate.  She has 50% right ICA stenosis and a 2 mm aneurysm in the right ICA.  No high-grade occlusion.  No acute findings on CT head or perfusion study.  Urinalysis has been collected and am awaiting  results of that. [DT]   1155 On repeat exam she tells me her vision is still double.  I spoke with her about CT findings and plan for admission [DT]   1155  for further evaluation [DT]      ED Course User Index  [DT] Julian Miramontes MD                          Total (NIH Stroke Scale): 1                  Medical Decision Making  Saw her emergently in the hallway, declared her code stroke. Had consulattion w stroke team, mult evals of pt and discussions w stroke team     Problems Addressed:  Ataxia: complicated acute illness or injury that poses a threat to life or bodily functions  Double vision: complicated acute illness or injury that poses a threat to life or bodily functions    Amount and/or Complexity of Data Reviewed  External Data Reviewed: notes.  Labs: ordered. Decision-making details documented in ED Course.  Radiology: ordered. Decision-making details documented in ED Course.  ECG/medicine tests: ordered. Decision-making details documented in ED Course.    Risk  Prescription drug management.  Decision regarding hospitalization.        Final diagnoses:   Double vision   Ataxia       ED Disposition  ED Disposition       ED Disposition   Decision to Admit    Condition   --    Comment   Level of Care: Telemetry [5]   Diagnosis: TIA (transient ischemic attack) [760671]                 No follow-up provider specified.       Medication List      No changes were made to your prescriptions during this visit.            Julian Miramontes MD  07/22/24 4459

## 2024-07-22 NOTE — THERAPY DISCHARGE NOTE
Acute Care - Speech Language Pathology   Initial Evaluation/Discharge   [place]    Cognitive-Communication Evaluation       Patient Name: Sylvia Jalloh  : 1934  MRN: [id number]  Today's Date: 2024               Admit Date: 2024     Visit Dx:    ICD-10-CM ICD-9-CM   1. Double vision  H53.2 368.2   2. Ataxia  R27.0 781.3     Patient Active Problem List   Diagnosis    Orthopnea    Hypertensive Emergency on chronic Essential hypertension    Hypothyroidism    Bundle branch block, left    CKD (chronic kidney disease) stage 3, GFR 30-59 ml/min    Acute systolic heart failure    Non-ischemic cardiomyopathy    Acute hypoxemic respiratory failure    TIA (transient ischemic attack)     Past Medical History:   Diagnosis Date    Chronic kidney disease     Disease of thyroid gland     Gallstone pancreatitis     Hypertension     Systolic heart failure secondary to idiopathic cardiomyopathy      Past Surgical History:   Procedure Laterality Date    APPENDECTOMY      BLADDER SUSPENSION      CARDIAC CATHETERIZATION N/A 2017    Procedure: Coronary angiography;  Surgeon: Thiago Vega MD;  Location:  [facility] INVASIVE LOCATION;  Service:     CHOLECYSTECTOMY      HYSTERECTOMY         SLP Recommendation and Plan  SLP Diagnosis: functional speech/language skills, functional cognitive-linguistic skills (24)        [facility] Criteria for Skilled Therapy Interventions Met: no problems identified which require skilled intervention, baseline status (24)        Therapy Frequency (SLP [facility]): evaluation only (24)                                          SLP EVALUATION (Last 72 Hours)       SLP [facility] Evaluation       Row Name 24                   Communication Assessment/Intervention    Document Type discharge evaluation/summary  -CH        Subjective Information no complaints  -CH        Patient Observations alert;cooperative;agree to therapy  -         Patient/Family/Caregiver Comments/Observations family present  -        Patient Effort good  -CH           General Information    Patient Profile Reviewed yes  -        Precautions/Limitations, Vision WFL with corrective lenses;vision impairment, left  -        Precautions/Limitations, Hearing WFL;for purposes of eval  -        Prior Level of Function-Communication WFL  -        Plans/Goals Discussed with patient;agreed upon  -        Barriers to Rehab none identified  -           Pain    Additional Documentation Pain Scale: FACES Pre/Post-Treatment (Group)  -           Pain Scale: FACES Pre/Post-Treatment    Pain: FACES Scale, Pretreatment 0-->no hurt  -CH        Posttreatment Pain Rating 0-->no hurt  -CH           Comprehension Assessment/Intervention    Comprehension Assessment/Intervention Auditory Comprehension;Reading Comprehension  -           Auditory Comprehension Assessment/Intervention    Auditory Comprehension (Communication) WNL  -           Reading Comprehension Assessment/Intervention    Reading Comprehension (Communication) WNL  -           Expression Assessment/Intervention    Expression Assessment/Intervention verbal expression  -           Verbal Expression Assessment/Intervention    Verbal Expression WNL  -CH           Oral Motor Structure and Function    Oral Motor Structure and Function WFL  -        Dentition Assessment edentulous;other (see comments)  has dentures but not wearing  -        Mucosal Quality moist, healthy  -           Oral Musculature and Cranial Nerve Assessment    Oral Motor General Assessment WFL  -           Motor Speech Assessment/Intervention    Motor Speech Function WNL  -           Cursory Voice Assessment/Intervention    Quality and Resonance (Voice) WNL  -           Cognitive Assessment Intervention- SLP    Cognitive Function (Cognition) WFL  -        Orientation Status (Cognition) awareness of basic personal  information;person;place;situation;WNL;time;WFL  -CH        Memory (Cognitive) simple;functional;immediate;short-term;long-term;WNL  -        Attention (Cognitive) selective;sustained;attention to detail;WFL  -        Reasoning (Cognitive) simple;deductive;WNL  -        Problem Solving (Cognitive) simple;temporal;WNL  -CH        Functional Math (Cognitive) simple;word problems;WNL  -CH        Pragmatics (Communication) WNL  -           SLP Evaluation Clinical Impressions    SLP Diagnosis functional speech/language skills;functional cognitive-linguistic skills  -        [facility] Criteria for Skilled Therapy Interventions Met no problems identified which require skilled intervention;baseline status  -        Functional Impact no impact on function  -           Recommendations    Therapy Frequency (SLP [facility]) evaluation only  -           SLP Discharge Summary    Progress Toward Achieving Short/long Term Goals discharge on same date as initial evaluation  -                  User Key  (r) = Recorded By, (t) = Taken By, (c) = Cosigned By      Initials Name Effective Dates    Sarah Beth Cordova MS CCC-SLP 06/16/21 -                        EDUCATION  The patient has been educated in the following areas:   Cognitive Impairment Communication Impairment.                    Time Calculation:    Time Calculation- SLP       Row Name 07/22/24 1613             Time Calculation- SLP    SLP Start Time 1450  -      SLP Received On 07/22/24  -         Untimed Charges    SLP Eval/Re-eval  ST Eval Speech and Production w/ Language - 75180  -CH      34535-QH Eval Speech and Production w/ Language Minutes 40  -CH         Total Minutes    Untimed Charges Total Minutes 40  -CH       Total Minutes 40  -CH                User Key  (r) = Recorded By, (t) = Taken By, (c) = Cosigned By      Initials Name Provider Type    Sarah Beth Cordova MS CCC-SLP Speech and Language Pathologist                    Therapy Charges for Today        Code Description Service Date Service Provider Modifiers Qty    [id number] HC ST EVAL SPEECH AND PROD W LANG  3 7/22/2024 Sarah Beth Weems, MS CCC-SLP GN 1                     SLP Discharge Summary  Progress Toward Achieving Short/long Term Goals: discharge on same date as initial evaluation    Sarah Beth Weems MS CCC-SLP  7/22/2024

## 2024-07-22 NOTE — Clinical Note
Level of Care: Observation Unit [28]   Diagnosis: TIA (transient ischemic attack) [289149]   Admitting Physician: TRISHA HERNANDEZ [[id number]]

## 2024-07-23 ENCOUNTER — APPOINTMENT (OUTPATIENT)
Dept: NEUROLOGY | Facility: HOSPITAL | Age: 89
End: 2024-07-23
Payer: MEDICARE

## 2024-07-23 LAB
ALBUMIN SERPL-MCNC: 3.5 G/DL (ref 3.5–5.2)
ALBUMIN/GLOB SERPL: 1.1 G/DL
ALP SERPL-CCNC: 72 U/L (ref 39–117)
ALT SERPL W P-5'-P-CCNC: 9 U/L (ref 1–33)
ANION GAP SERPL CALCULATED.3IONS-SCNC: 12 MMOL/L (ref 5–15)
AST SERPL-CCNC: 22 U/L (ref 1–32)
BASOPHILS # BLD AUTO: 0.08 10*3/MM3 (ref 0–0.2)
BASOPHILS NFR BLD AUTO: 0.6 % (ref 0–1.5)
BILIRUB SERPL-MCNC: 0.8 MG/DL (ref 0–1.2)
BUN SERPL-MCNC: 24 MG/DL (ref 8–23)
BUN/CREAT SERPL: 15.5 (ref 7–25)
CALCIUM SPEC-SCNC: 8.7 MG/DL (ref 8.6–10.5)
CHLORIDE SERPL-SCNC: 102 MMOL/L (ref 98–107)
CHOLEST SERPL-MCNC: 118 MG/DL (ref 0–200)
CO2 SERPL-SCNC: 22 MMOL/L (ref 22–29)
CREAT SERPL-MCNC: 1.55 MG/DL (ref 0.57–1)
DEPRECATED RDW RBC AUTO: 49.6 FL (ref 37–54)
EGFRCR SERPLBLD CKD-EPI 2021: 31.9 ML/MIN/1.73
EOSINOPHIL # BLD AUTO: 0.13 10*3/MM3 (ref 0–0.4)
EOSINOPHIL NFR BLD AUTO: 0.9 % (ref 0.3–6.2)
ERYTHROCYTE [DISTWIDTH] IN BLOOD BY AUTOMATED COUNT: 13.6 % (ref 12.3–15.4)
GLOBULIN UR ELPH-MCNC: 3.2 GM/DL
GLUCOSE SERPL-MCNC: 104 MG/DL (ref 65–99)
HBA1C MFR BLD: 5 % (ref 4.8–5.6)
HCT VFR BLD AUTO: 36.5 % (ref 34–46.6)
HDLC SERPL-MCNC: 53 MG/DL (ref 40–60)
HGB BLD-MCNC: 12 G/DL (ref 12–15.9)
IMM GRANULOCYTES # BLD AUTO: 0.05 10*3/MM3 (ref 0–0.05)
IMM GRANULOCYTES NFR BLD AUTO: 0.4 % (ref 0–0.5)
LDLC SERPL CALC-MCNC: 47 MG/DL (ref 0–100)
LDLC/HDLC SERPL: 0.86 {RATIO}
LYMPHOCYTES # BLD AUTO: 2.04 10*3/MM3 (ref 0.7–3.1)
LYMPHOCYTES NFR BLD AUTO: 14.3 % (ref 19.6–45.3)
MCH RBC QN AUTO: 32.3 PG (ref 26.6–33)
MCHC RBC AUTO-ENTMCNC: 32.9 G/DL (ref 31.5–35.7)
MCV RBC AUTO: 98.4 FL (ref 79–97)
MONOCYTES # BLD AUTO: 1.38 10*3/MM3 (ref 0.1–0.9)
MONOCYTES NFR BLD AUTO: 9.7 % (ref 5–12)
NEUTROPHILS NFR BLD AUTO: 10.55 10*3/MM3 (ref 1.7–7)
NEUTROPHILS NFR BLD AUTO: 74.1 % (ref 42.7–76)
NRBC BLD AUTO-RTO: 0 /100 WBC (ref 0–0.2)
PLATELET # BLD AUTO: 206 10*3/MM3 (ref 140–450)
PMV BLD AUTO: 10.4 FL (ref 6–12)
POTASSIUM SERPL-SCNC: 4.5 MMOL/L (ref 3.5–5.2)
PROT SERPL-MCNC: 6.7 G/DL (ref 6–8.5)
QT INTERVAL: 454 MS
QTC INTERVAL: 493 MS
RBC # BLD AUTO: 3.71 10*6/MM3 (ref 3.77–5.28)
SODIUM SERPL-SCNC: 136 MMOL/L (ref 136–145)
TRIGL SERPL-MCNC: 98 MG/DL (ref 0–150)
VLDLC SERPL-MCNC: 18 MG/DL (ref 5–40)
WBC NRBC COR # BLD AUTO: 14.23 10*3/MM3 (ref 3.4–10.8)

## 2024-07-23 PROCEDURE — 95819 EEG AWAKE AND ASLEEP: CPT

## 2024-07-23 PROCEDURE — 95819 EEG AWAKE AND ASLEEP: CPT | Performed by: PSYCHIATRY & NEUROLOGY

## 2024-07-23 PROCEDURE — 99233 SBSQ HOSP IP/OBS HIGH 50: CPT | Performed by: STUDENT IN AN ORGANIZED HEALTH CARE EDUCATION/TRAINING PROGRAM

## 2024-07-23 PROCEDURE — 85025 COMPLETE CBC W/AUTO DIFF WBC: CPT | Performed by: HOSPITALIST

## 2024-07-23 PROCEDURE — 80053 COMPREHEN METABOLIC PANEL: CPT | Performed by: HOSPITALIST

## 2024-07-23 PROCEDURE — 97166 OT EVAL MOD COMPLEX 45 MIN: CPT

## 2024-07-23 PROCEDURE — 83036 HEMOGLOBIN GLYCOSYLATED A1C: CPT

## 2024-07-23 PROCEDURE — 97116 GAIT TRAINING THERAPY: CPT

## 2024-07-23 PROCEDURE — 97161 PT EVAL LOW COMPLEX 20 MIN: CPT

## 2024-07-23 PROCEDURE — 25010000002 CEFTRIAXONE PER 250 MG: Performed by: HOSPITALIST

## 2024-07-23 PROCEDURE — 80061 LIPID PANEL: CPT

## 2024-07-23 PROCEDURE — 99232 SBSQ HOSP IP/OBS MODERATE 35: CPT | Performed by: INTERNAL MEDICINE

## 2024-07-23 PROCEDURE — 25010000002 HEPARIN (PORCINE) PER 1000 UNITS: Performed by: HOSPITALIST

## 2024-07-23 PROCEDURE — 97535 SELF CARE MNGMENT TRAINING: CPT

## 2024-07-23 RX ORDER — ACETAMINOPHEN 325 MG/1
650 TABLET ORAL EVERY 6 HOURS PRN
Status: DISCONTINUED | OUTPATIENT
Start: 2024-07-23 | End: 2024-07-26 | Stop reason: HOSPADM

## 2024-07-23 RX ADMIN — Medication 10 ML: at 21:08

## 2024-07-23 RX ADMIN — SODIUM CHLORIDE 1000 MG: 900 INJECTION INTRAVENOUS at 14:49

## 2024-07-23 RX ADMIN — SACUBITRIL AND VALSARTAN 1 TABLET: 49; 51 TABLET, FILM COATED ORAL at 21:06

## 2024-07-23 RX ADMIN — ACETAMINOPHEN 650 MG: 325 TABLET ORAL at 10:33

## 2024-07-23 RX ADMIN — GUAIFENESIN 600 MG: 600 TABLET, EXTENDED RELEASE ORAL at 09:12

## 2024-07-23 RX ADMIN — HEPARIN SODIUM 5000 UNITS: 5000 INJECTION INTRAVENOUS; SUBCUTANEOUS at 06:24

## 2024-07-23 RX ADMIN — HEPARIN SODIUM 5000 UNITS: 5000 INJECTION INTRAVENOUS; SUBCUTANEOUS at 14:49

## 2024-07-23 RX ADMIN — Medication 10 ML: at 09:17

## 2024-07-23 RX ADMIN — ASPIRIN 81 MG CHEWABLE TABLET 81 MG: 81 TABLET CHEWABLE at 09:12

## 2024-07-23 RX ADMIN — HEPARIN SODIUM 5000 UNITS: 5000 INJECTION INTRAVENOUS; SUBCUTANEOUS at 21:19

## 2024-07-23 RX ADMIN — CARVEDILOL 12.5 MG: 12.5 TABLET, FILM COATED ORAL at 18:28

## 2024-07-23 RX ADMIN — LEVOTHYROXINE SODIUM 75 MCG: 0.07 TABLET ORAL at 06:23

## 2024-07-23 RX ADMIN — DOXYCYCLINE 100 MG: 100 CAPSULE ORAL at 09:17

## 2024-07-23 RX ADMIN — ACETAMINOPHEN 650 MG: 325 TABLET ORAL at 22:17

## 2024-07-23 RX ADMIN — DOXYCYCLINE 100 MG: 100 CAPSULE ORAL at 21:06

## 2024-07-23 RX ADMIN — SACUBITRIL AND VALSARTAN 1 TABLET: 49; 51 TABLET, FILM COATED ORAL at 09:11

## 2024-07-23 RX ADMIN — AMLODIPINE BESYLATE 2.5 MG: 2.5 TABLET ORAL at 09:11

## 2024-07-23 RX ADMIN — ESTRADIOL 0.5 MG: 0.5 TABLET ORAL at 09:17

## 2024-07-23 RX ADMIN — CLOPIDOGREL BISULFATE 75 MG: 75 TABLET ORAL at 09:11

## 2024-07-23 RX ADMIN — CETIRIZINE HYDROCHLORIDE 10 MG: 10 TABLET, FILM COATED ORAL at 09:11

## 2024-07-23 RX ADMIN — GUAIFENESIN 600 MG: 600 TABLET, EXTENDED RELEASE ORAL at 21:06

## 2024-07-23 RX ADMIN — ATORVASTATIN CALCIUM 80 MG: 40 TABLET, FILM COATED ORAL at 21:06

## 2024-07-23 RX ADMIN — Medication 1 TABLET: at 09:11

## 2024-07-23 NOTE — PLAN OF CARE
Goal Outcome Evaluation:      Patient slept during shift. VSS on RA. Confused, disoriented to time. NIH 3. Family at bedside. Eye patch placed by OT to help with double vision. Up in chair during shift. Incontinent of bladder, voiding via purewick. PRN tylenol administered for lower back pain. Call light in reach.

## 2024-07-23 NOTE — PLAN OF CARE
Problem: Adult Inpatient Plan of Care  Goal: Absence of Hospital-Acquired Illness or Injury  Intervention: Identify and Manage Fall Risk  Recent Flowsheet Documentation  Taken 7/23/2024 0439 by Peggy Hilton RN  Safety Promotion/Fall Prevention:   activity supervised   assistive device/personal items within reach   clutter free environment maintained   lighting adjusted   nonskid shoes/slippers when out of bed   room organization consistent   safety round/check completed  Taken 7/23/2024 0247 by Peggy Hilton RN  Safety Promotion/Fall Prevention:   activity supervised   assistive device/personal items within reach   clutter free environment maintained   lighting adjusted   nonskid shoes/slippers when out of bed   room organization consistent   safety round/check completed  Taken 7/23/2024 0052 by Peggy Hilton RN  Safety Promotion/Fall Prevention:   activity supervised   assistive device/personal items within reach   clutter free environment maintained   lighting adjusted   nonskid shoes/slippers when out of bed   room organization consistent   safety round/check completed  Taken 7/22/2024 2202 by Peggy Hilton RN  Safety Promotion/Fall Prevention:   activity supervised   assistive device/personal items within reach   clutter free environment maintained   lighting adjusted   nonskid shoes/slippers when out of bed   room organization consistent   safety round/check completed  Taken 7/22/2024 2017 by Peggy Hilton RN  Safety Promotion/Fall Prevention:   activity supervised   assistive device/personal items within reach   clutter free environment maintained   lighting adjusted   nonskid shoes/slippers when out of bed   room organization consistent   safety round/check completed  Intervention: Prevent Skin Injury  Recent Flowsheet Documentation  Taken 7/23/2024 0439 by Peggy Hilton, RN  Body Position:   tilted   right  Skin Protection:   adhesive use limited   incontinence pads utilized   tubing/devices free from skin  contact   skin-to-skin areas padded   skin-to-device areas padded   skin sealant/moisture barrier applied  Taken 7/23/2024 0247 by Peggy Hilton RN  Body Position: weight shifting  Skin Protection:   adhesive use limited   incontinence pads utilized   tubing/devices free from skin contact   skin-to-skin areas padded   skin-to-device areas padded  Taken 7/23/2024 0052 by Peggy Hilton RN  Body Position:   tilted   left  Skin Protection:   adhesive use limited   incontinence pads utilized   tubing/devices free from skin contact   skin-to-skin areas padded   skin-to-device areas padded  Taken 7/22/2024 2202 by Peggy Hilton RN  Body Position: supine, legs elevated  Skin Protection:   adhesive use limited   incontinence pads utilized   tubing/devices free from skin contact   skin-to-skin areas padded   skin-to-device areas padded   skin sealant/moisture barrier applied   pulse oximeter probe site changed  Taken 7/22/2024 2017 by Peggy Hilton RN  Body Position:   tilted   right  Skin Protection:   adhesive use limited   incontinence pads utilized   tubing/devices free from skin contact   skin-to-skin areas padded   skin-to-device areas padded   skin sealant/moisture barrier applied  Intervention: Prevent and Manage VTE (Venous Thromboembolism) Risk  Recent Flowsheet Documentation  Taken 7/23/2024 0439 by Peggy Hilton RN  VTE Prevention/Management:   bilateral   sequential compression devices on  Taken 7/23/2024 0247 by Peggy Hilton RN  VTE Prevention/Management:   bilateral   sequential compression devices on  Taken 7/23/2024 0052 by Peggy Hilton RN  VTE Prevention/Management:   bilateral   sequential compression devices on  Taken 7/22/2024 2202 by Peggy Hilton RN  VTE Prevention/Management:   bilateral   sequential compression devices on  Taken 7/22/2024 2017 by Peggy Hilton RN  Activity Management: dorsiflexion/plantar flexion performed  VTE Prevention/Management:   bilateral   sequential compression devices  on  Range of Motion: active ROM (range of motion) encouraged  Intervention: Prevent Infection  Recent Flowsheet Documentation  Taken 7/23/2024 0439 by Peggy Hilton RN  Infection Prevention:   environmental surveillance performed   hand hygiene promoted   rest/sleep promoted   single patient room provided  Taken 7/23/2024 0247 by Peggy Hilton RN  Infection Prevention:   environmental surveillance performed   hand hygiene promoted   rest/sleep promoted   single patient room provided  Taken 7/23/2024 0052 by Peggy Hilton RN  Infection Prevention:   environmental surveillance performed   hand hygiene promoted   rest/sleep promoted   single patient room provided  Taken 7/22/2024 2202 by Peggy Hilton RN  Infection Prevention:   environmental surveillance performed   hand hygiene promoted   rest/sleep promoted   single patient room provided  Taken 7/22/2024 2017 by Peggy Hilton RN  Infection Prevention:   environmental surveillance performed   hand hygiene promoted   rest/sleep promoted   single patient room provided  Goal: Optimal Comfort and Wellbeing  Intervention: Provide Person-Centered Care  Recent Flowsheet Documentation  Taken 7/22/2024 2017 by Peggy Hilton RN  Trust Relationship/Rapport:   care explained   choices provided   emotional support provided   empathic listening provided   questions answered   reassurance provided   questions encouraged   thoughts/feelings acknowledged   Goal Outcome Evaluation:

## 2024-07-23 NOTE — PLAN OF CARE
Goal Outcome Evaluation:  Plan of Care Reviewed With: (P) patient, family           Outcome Evaluation: (P) Pt able to ambulate 150' this session with pt and family reports of quality being at baseline. Pt lethargic throughout session with increase time to follow commands. Pt presents below baseline with deficits in balance and activity tolerance. Further IPPT warranted for stair training prior to DC. PT recommended DC to home with 24/7 assist with OPPT.      Anticipated Discharge Disposition (PT): (P) home with outpatient therapy services, home with 24/7 care

## 2024-07-23 NOTE — PLAN OF CARE
Goal Outcome Evaluation:  Plan of Care Reviewed With: patient, family        Progress: no change (Initial Eval)  Outcome Evaluation: Pt presenting below her functional baseline w/ transfers, mobility and balance limiting independence w/ ADL based tasks. Pt limited by diplopia w/ improvement noted from occlusion. Pt requiring Riley to complete HH distances of functional mobility. Pt's deficits warrant skilled IP OT services. Recommend home w/ 24/7 assist and OP vision therapy at d/c.      Anticipated Discharge Disposition (OT): home with 24/7 care, home with outpatient therapy services

## 2024-07-23 NOTE — PROGRESS NOTES
Stroke Neurology Progress Note     Subjective     This patient was seen in follow-up for: left thalamic stroke   Present for the encounter were: self, patient, patient's son and daughter    Subjective:  My first encounter with the patient.  No acute events overnight. Patient denies questions or concerns. Family expressed concerns about patient's memory. MR images reviewed at bedside.     Objective      Temp:  [97.8 °F (36.6 °C)-98.7 °F (37.1 °C)] 97.8 °F (36.6 °C)  Heart Rate:  [67-76] 68  Resp:  [16-18] 18  BP: (115-155)/(56-99) 128/56            Objective    Physical Exam:  General Appearance: sleeping but arousable.   HEENT: anicteric sclera, no scleral injection. Left eye covered in bandage.  Lungs: respirations appear comfortable, no obvious increased work of breathing  Extremities: No cyanosis or fingernail clubbing   Skin: No rashes in exposed skin areas     Neurological Examination:   Mental status: Alert and oriented to person, place, and year. Not oriented to month. No dysarthria. Phenomic errors noted with reading.   Cranial Nerves: Visual fields intact. Extraocular movements intact with no nystagmus. Midline gaze. Face symmetric.    Sensory: Normal sensory exam to light touch.  Motor: Normal tone. Absent pronator drift.  Strength:  LUE: 4+/5 biceps, triceps,   LLE: 4/5 hip flexion/extension  RUE: 4+/5 biceps, triceps,   RLE:  4/5 hip flexion/extension       Labs:    Lab Results   Component Value Date    HGBA1C 5.00 07/23/2024      Lab Results   Component Value Date    CHOL 118 07/23/2024    CHLPL 190 05/17/2015    TRIG 98 07/23/2024    HDL 53 07/23/2024    LDL 47 07/23/2024       Lab Results   Component Value Date    WBC 14.23 (H) 07/23/2024    HGB 12.0 07/23/2024    HCT 36.5 07/23/2024    MCV 98.4 (H) 07/23/2024     07/23/2024     Lab Results   Component Value Date    GLUCOSE 104 (H) 07/23/2024    BUN 24 (H) 07/23/2024    CREATININE 1.55 (H) 07/23/2024    EGFRIFNONA 34 (L) 07/07/2020     BCR 15.5 07/23/2024    CO2 22.0 07/23/2024    CALCIUM 8.7 07/23/2024    ALBUMIN 3.5 07/23/2024    AST 22 07/23/2024    ALT 9 07/23/2024       Results from last 7 days   Lab Units 07/23/24  0333 07/22/24  1018 07/22/24  1009   SODIUM mmol/L 136 136  --    POTASSIUM mmol/L 4.5 4.2  --    CHLORIDE mmol/L 102 101  --    CO2 mmol/L 22.0 21.0*  --    BUN mg/dL 24* 22  --    CREATININE mg/dL 1.55* 1.51* 1.60*   CALCIUM mg/dL 8.7 8.9  --    BILIRUBIN mg/dL 0.8 0.8  --    ALK PHOS U/L 72 77  --    ALT (SGPT) U/L 9 9  9  --    AST (SGOT) U/L 22 21  23  --    GLUCOSE mg/dL 104* 109*  --        Lab Results   Component Value Date    BLOODCX No growth at 24 hours 07/22/2024    BLOODCX No growth at 24 hours 07/22/2024     UA          7/22/2024    11:46   Urinalysis   Squamous Epithelial Cells, UA None Seen    Specific [place], UA 1.019    Ketones, UA Negative    Blood, UA Trace    Leukocytes, UA Moderate (2+)    Nitrite, UA Negative    RBC, UA 0-2    WBC, UA 21-50    Bacteria, UA 3+      Lab Results   Component Value Date    URINECX >100,000 CFU/mL Escherichia coli (A) 07/22/2024       Results Review:      All brain images and reports were personally reviewed and I agree with the interpretations except as noted below.    MRI Brain Without Contrast 7/22/2024  Impression: 1. Acute left thalamic infarct, and punctate white matter infarct in the high anterior left frontal lobe.  2. Chronic small vessel ischemic white matter changes.    CT Angiogram Head and Neck 7/22/2024  Impression: 1. No acute abnormality identified within the large arteries of the head or neck. 2. Focal plaque within the right proximal ICA with approximately 40 to 50% focal stenosis 1.5 cm beyond its origin. 3. No significant stenosis of the left internal carotid artery. 4. There is suggestion of a 2 mm saccular aneurysm seen about the junction of the right supraclinoid ICA/P-comm (series 5, images 237-238).     CT Perfusion 7/22/2024  Impression: CT perfusion  study demonstrates no territorial ischemia or core infarct.    CT Head Without Contrast Stroke Protocol 7/22/2024  Impression: Chronic appearing changes of the aging brain. No evidence of acute intracranial disease.     Transthoracic echocardiogram 7/22/2024  Impression:        Left ventricular systolic function is low normal. Left ventricular ejection fraction appears to be 51 - 55%.    Left ventricular wall thickness is consistent with borderline concentric hypertrophy.    Left ventricular diastolic function is consistent with (grade Ia w/high LAP) impaired relaxation.    Left atrial volume is severely increased.    Mild aortic valve stenosis is present. Aortic valve area is 1.1 cm2.    Peak velocity of the flow distal to the aortic valve is 270 cm/s. Aortic valve maximum pressure gradient is 29 mmHg. Aortic valve mean pressure gradient is 16 mmHg.    Estimated right ventricular systolic pressure from tricuspid regurgitation is normal (<35 mmHg). Calculated right ventricular systolic pressure from tricuspid regurgitation is 26 mmHg.    There is a left pleural effusion. There is a right pleural effusion.    Mild to moderate mitral valve regurgitation    EF slightly improved compared to previous study      Assessment/Plan     Assessment:    Daija Elizabeth is an 89-year-old female with a PMH HTN, hypothyroidism, CKD, nonischemic cardiomyopathy who presented to [facility] ED on 7/22/2024 with acute onset vision deficits.  CT head 7/22 unremarkable.  CT angiogram head and neck 7/22/2024 reveals 40-50% R ICA stenoses, 2 mm right supraclinoid saccular aneurysm.  MRI brain 7/22 revealed acute left thalamic infarct and high left frontal lobe infarct.  TTE 7/22/2024 LVEF 51-55%, severely dilated left atrial volume, no PFO or thrombus mentioned.    # Acute multifocal infarct: left thalamic and left frontoparietal  Suspect cardioembolic etiologies in setting of severely dilated left atrial volume and stroke in multiple vascular  territories.  # Extracranial atherosclerotic disease: will treat with maximal medical management  # Right supraclinoid aneurysm  # Altered mental status    -Continue aspirin 81 mg daily  -Continue clopidogrel 75 mg daily  -Continue atorvastatin 80 mg daily  -Monitor telemetry for atrial fibrillation  -If no atrial fibrillation is found on telemetry, recommend Holter monitor prior to discharge  -If atrial fibrillation is found, would recommend switch DAPT to apixaban 5 mg BID  -EEG ordered  -PT OT recommend outpatient vision therapies  -Neuro-checks per unit protocol while inpatient  -Blood pressure goal: permissive  -DVT prophylaxis: SCD, heparin  -Follow-up in stroke clinic       Patient education: call 911 or present to emergency department with any stroke symptom, including unilateral face, arm, or leg weakness, numbness, or paresthesias, unilateral facial droop, speech deficits, dizziness with nausea, vomiting, nystagmus, and incoordination, visual deficits, or severe onset headache.    Stroke neurology will follow results of above workup.  Please call with questions.     Regina Nguyen MD  [facility] STROKE NEURO  07/23/24  15:07 EDT

## 2024-07-23 NOTE — THERAPY EVALUATION
Patient Name: Sylvia Jalloh  : 1934    MRN: [id number]                              Today's Date: 2024       Admit Date: 2024    Visit Dx:     ICD-10-CM ICD-9-CM   1. Double vision  H53.2 368.2   2. Ataxia  R27.0 781.3     Patient Active Problem List   Diagnosis    Orthopnea    Hypertensive Emergency on chronic Essential hypertension    Hypothyroidism    Bundle branch block, left    CKD (chronic kidney disease) stage 3, GFR 30-59 ml/min    Acute systolic heart failure    Non-ischemic cardiomyopathy    Acute hypoxemic respiratory failure    TIA (transient ischemic attack)     Past Medical History:   Diagnosis Date    Chronic kidney disease     Disease of thyroid gland     Gallstone pancreatitis 2015    Hypertension     Systolic heart failure secondary to idiopathic cardiomyopathy      Past Surgical History:   Procedure Laterality Date    APPENDECTOMY      BLADDER SUSPENSION      CARDIAC CATHETERIZATION N/A 2017    Procedure: Coronary angiography;  Surgeon: Thiago Vega MD;  Location:  [facility];  Service:     CHOLECYSTECTOMY      HYSTERECTOMY        General Information       Row Name 24 1116          OT Time and Intention    Document Type evaluation  -MR     Mode of Treatment occupational therapy  -MR       Row Name 24 1116          General Information    Patient Profile Reviewed yes  -MR     Prior Level of Function independent:;all household mobility;community mobility;gait;transfer;bed mobility;ADL's  PTA Pt I w/ ADLs. Utilizes a RW for mobility at night otherwise no AD utilized. Pt denies any recent falls. Pt has a RW< SPC and bath bench. Regular tub w/ difficulty getting in and out at baseline.  -MR     Existing Precautions/Restrictions fall;other (see comments)  Diplopia (improvement noted w/ occlusion)  -MR     Barriers to Rehab medically complex  -MR       Row Name 24 1116          Living Environment    People in Home alone;other (see  comments)  Dtr & Granddtr reside close by  -MR       Row Name 07/23/24 1116          Home Main Entrance    Number of Stairs, Main Entrance two  -MR     Stair Railings, Main Entrance railing on left side (ascending)  -MR       Row Name 07/23/24 1116          Stairs Within Home, Primary    Number of Stairs, Within Home, Primary none  -MR       Row Name 07/23/24 1116          Cognition    Orientation Status (Cognition) oriented x 3;other (see comments)  Requiring cueing for year  -MR       Row Name 07/23/24 1116          Safety Issues, Functional Mobility    Safety Issues Affecting Function (Mobility) insight into deficits/self-awareness;safety precaution awareness;safety precautions follow-through/compliance;sequencing abilities  -MR     Impairments Affecting Function (Mobility) balance;strength;endurance/activity tolerance;visual/perceptual;coordination  -MR               User Key  (r) = Recorded By, (t) = Taken By, (c) = Cosigned By      Initials Name Provider Type    MR Emerald Shukla, OT Occupational Therapist                     Mobility/ADL's       Row Name 07/23/24 1129          Bed Mobility    Bed Mobility supine-sit  -MR     Supine-Sit Dent (Bed Mobility) contact guard;verbal cues  -MR     Assistive Device (Bed Mobility) head of bed elevated;bed rails  -MR     Comment, (Bed Mobility) Pt requiring v/c for sequencing and safety.  -MR       Row Name 07/23/24 1129          Transfers    Transfers sit-stand transfer  -MR       Row Name 07/23/24 1129          Sit-Stand Transfer    Sit-Stand Dent (Transfers) contact guard;verbal cues  -MR     Assistive Device (Sit-Stand Transfers) walker, front-wheeled  -MR       Row Name 07/23/24 1129          Functional Mobility    Functional Mobility- Ind. Level minimum assist (75% patient effort);verbal cues required;nonverbal cues required (demo/gesture)  -MR     Functional Mobility- Device walker, front-wheeled  -MR     Functional Mobility-Distance (Feet) --    distances of functional mobility  -MR     Functional Mobility- Comment Pt requiring assist to manipulate the RW d/t L eye being occluded for diplopia.  -MR       Row Name 07/23/24 1129          Activities of Daily Living    BADL Assessment/Intervention upper body dressing;lower body dressing  -MR       Row Name 07/23/24 1129          Upper Body Dressing Assessment/Training    Cassia Level (Upper Body Dressing) don;contact guard assist  -MR     Position (Upper Body Dressing) edge of bed sitting  -MR       Row Name 07/23/24 1129          Lower Body Dressing Assessment/Training    Cassia Level (Lower Body Dressing) don;maximum assist (25% patient effort)  -MR     Position (Lower Body Dressing) supine  -MR               User Key  (r) = Recorded By, (t) = Taken By, (c) = Cosigned By      Initials Name Provider Type    Emerald De La Garza OT Occupational Therapist                   Obj/Interventions       Row Name 07/23/24 1131          Sensory Assessment (Somatosensory)    Sensory Assessment (Somatosensory) UE sensation intact  -MR       Row Name 07/23/24 1131          Vision Assessment/Intervention    Visual Impairment/Limitations blurry vision;diplopia  -     Vision Assessment Comment Improvement noted w/ occlusion. Pt educated on doffing gauze and placing on the other eye in an hour and switching throughout the day.  -MR       Row Name 07/23/24 1131          Range of Motion Comprehensive    General Range of Motion no range of motion deficits identified  -MR       Row Name 07/23/24 1131          Strength Comprehensive (MMT)    General Manual Muscle Testing (MMT) Assessment no strength deficits identified  -MR       Row Name 07/23/24 1131          Balance    Balance Assessment sitting static balance;sitting dynamic balance;standing static balance;standing dynamic balance  -MR     Static Sitting Balance standby assist  -MR     Dynamic Sitting Balance contact guard  -MR     Position, Sitting Balance  unsupported;sitting edge of bed  -MR     Static Standing Balance contact guard  -MR     Dynamic Standing Balance minimal assist  -MR     Position/Device Used, Standing Balance supported;walker, front-wheeled  -MR     Balance Interventions sitting;standing;sit to stand;supported;static;dynamic;minimal challenge;occupation based/functional task  -MR     Comment, Balance No overt LOB noted, pt slightly unsteady and bumped into things on the L side d/t L eye occlusion.  -MR               User Key  (r) = Recorded By, (t) = Taken By, (c) = Cosigned By      Initials Name Provider Type    Emerald De La Garza, OT Occupational Therapist                   Goals/Plan       Row Name 07/23/24 1136          Bed Mobility Goal 1 (OT)    Activity/Assistive Device (Bed Mobility Goal 1, OT) sit to supine;supine to sit  -MR     Clayton Level/Cues Needed (Bed Mobility Goal 1, OT) supervision required  -MR     Time Frame (Bed Mobility Goal 1, OT) short term goal (STG);3 days  -MR       Row Name 07/23/24 1136          Transfer Goal 1 (OT)    Activity/Assistive Device (Transfer Goal 1, OT) sit-to-stand/stand-to-sit;toilet  -MR     Cl[place]ton Level/Cues Needed (Transfer Goal 1, OT) contact guard required  -MR     Time Frame (Transfer Goal 1, OT) long term goal (LTG);10 days  -MR     Progress/Outcome (Transfer Goal 1, OT) new goal  -MR       Row Name 07/23/24 1136          Dressing Goal 1 (OT)    Activity/Device (Dressing Goal 1, OT) lower body dressing  -MR     Cl[place]ton/Cues Needed (Dressing Goal 1, OT) moderate assist (50-74% patient effort);verbal cues required;nonverbal cues (demo/gesture) required  -MR     Time Frame (Dressing Goal 1, OT) long term goal (LTG);10 days  -MR     Progress/Outcome (Dressing Goal 1, OT) new goal  -MR       Row Name 07/23/24 1136          Therapy Assessment/Plan (OT)    Planned Therapy Interventions (OT) activity tolerance training;adaptive equipment training;BADL retraining;cognitive/visual perception  retraining;functional balance retraining;transfer/mobility retraining;strengthening exercise;ROM/therapeutic exercise;patient/caregiver education/training;IADL retraining;occupation/activity based interventions  -MR               User Key  (r) = Recorded By, (t) = Taken By, (c) = Cosigned By      Initials Name Provider Type     Emerald Shukla, OT Occupational Therapist                   Clinical Impression       Row Name 07/23/24 1133          Pain Assessment    Pretreatment Pain Rating 8/10  -MR     Posttreatment Pain Rating 7/10  -MR     Pain Location - Side/Orientation Bilateral  -MR     Pain Location generalized  -MR     Pain Location - back  -MR     Pain Intervention(s) Ambulation/increased activity;Repositioned  -MR       Row Name 07/23/24 1133          Plan of Care Review    Plan of Care Reviewed With patient;family  -MR     Progress no change  Initial Eval  -MR     Outcome Evaluation Pt presenting below her functional baseline w/ transfers, mobility and balance limiting independence w/ ADL based tasks. Pt limited by diplopia w/ improvement noted from occlusion. Pt requiring Riley to complete HH distances of functional mobility. Pt's deficits warrant skilled IP OT services. Recommend home w/ 24/7 assist and OP vision therapy at d/c.  -MR       Row Name 07/23/24 1133          Therapy Assessment/Plan (OT)    Patient/Family Therapy Goal Statement (OT) Return to PLOF  -MR     Rehab Potential (OT) good, to achieve stated therapy goals  -MR     Criteria for Skilled Therapeutic Interventions Met (OT) yes;meets criteria;skilled treatment is necessary  -MR     Therapy Frequency (OT) daily  -MR     Predicted Duration of Therapy Intervention (OT) 5 days  -MR       Row Name 07/23/24 1133          Therapy Plan Review/Discharge Plan (OT)    Anticipated Discharge Disposition (OT) home with 24/7 care;home with outpatient therapy services  -MR       Row Name 07/23/24 1133          Vital Signs    Pre Systolic BP Rehab 132   -MR     Pre Treatment Diastolic BP 57  -MR     Post Systolic BP Rehab 140  -MR     Post Treatment Diastolic BP 66  -MR     Pretreatment Heart Rate (beats/min) 70  -MR     Posttreatment Heart Rate (beats/min) 75  -MR     Post SpO2 (%) 96  -MR     O2 Delivery Post Treatment room air  -MR     Pre Patient Position Supine  -MR     Intra Patient Position Standing  -MR     Post Patient Position Sitting  -MR       Row Name 07/23/24 1133          Positioning and Restraints    Pre-Treatment Position in bed  -MR     Post Treatment Position chair  -MR     In Chair notified nsg;reclined;sitting;call light within reach;encouraged to call for assist;exit alarm on;with family/caregiver;waffle cushion;legs elevated  -MR               User Key  (r) = Recorded By, (t) = Taken By, (c) = Cosigned By      Initials Name Provider Type    Emerald De La Garza OT Occupational Therapist                   Outcome Measures       Row Name 07/23/24 1138          How much help from another is currently needed...    Putting on and taking off regular lower body clothing? 2  -MR     Bathing (including washing, rinsing, and drying) 2  -MR     Toileting (which includes using toilet bed pan or urinal) 2  -MR     Putting on and taking off regular upper body clothing 3  -MR     Taking care of personal grooming (such as brushing teeth) 3  -MR     Eating meals 3  -MR     AM-PAC 6 Clicks Score (OT) 15  -MR       Row Name 07/23/24 1138          Functional Assessment    Outcome Measure Options AM-PAC 6 Clicks Daily Activity (OT)  -MR               User Key  (r) = Recorded By, (t) = Taken By, (c) = Cosigned By      Initials Name Provider Type    Emerald De La Garza OT Occupational Therapist                    Occupational Therapy Education       Title: PT OT SLP Therapies (In Progress)       Topic: Occupational Therapy (In Progress)       Point: ADL training (Done)       Description:   Instruct learner(s) on proper safety adaptation and remediation techniques  during self care or transfers.   Instruct in proper use of assistive devices.                  Learning Progress Summary             Patient Acceptance, E, VU by MR at 7/23/2024 1138                         Point: Home exercise program (Not Started)       Description:   Instruct learner(s) on appropriate technique for monitoring, assisting and/or progressing therapeutic exercises/activities.                  Learner Progress:  Not documented in this visit.              Point: Precautions (Done)       Description:   Instruct learner(s) on prescribed precautions during self-care and functional transfers.                  Learning Progress Summary             Patient Acceptance, E, VU by MR at 7/23/2024 1138                         Point: Body mechanics (Done)       Description:   Instruct learner(s) on proper positioning and spine alignment during self-care, functional mobility activities and/or exercises.                  Learning Progress Summary             Patient Acceptance, E, VU by MR at 7/23/2024 1138                                         User Key       Initials Effective Dates Name Provider Type Discipline    MR 09/22/22 -  Emerald Shukla OT Occupational Therapist OT                  OT Recommendation and Plan  Planned Therapy Interventions (OT): activity tolerance training, adaptive equipment training, BADL retraining, cognitive/visual perception retraining, functional balance retraining, transfer/mobility retraining, strengthening exercise, ROM/therapeutic exercise, patient/caregiver education/training, IADL retraining, occupation/activity based interventions  Therapy Frequency (OT): daily  Plan of Care Review  Plan of Care Reviewed With: patient, family  Progress: no change (Initial Eval)  Outcome Evaluation: Pt presenting below her functional baseline w/ transfers, mobility and balance limiting independence w/ ADL based tasks. Pt limited by diplopia w/ improvement noted from occlusion. Pt requiring  Riley to complete HH distances of functional mobility. Pt's deficits warrant skilled IP OT services. Recommend home w/ 24/7 assist and OP vision therapy at d/c.     Time Calculation:   Evaluation Complexity (OT)  Review Occupational Profile/Medical/Therapy History Complexity: expanded/moderate complexity  Assessment, Occupational Performance/Identification of Deficit Complexity: 3-5 performance deficits  Clinical Decision Making Complexity (OT): detailed assessment/moderate complexity  Overall Complexity of Evaluation (OT): moderate complexity     Time Calculation- OT       Row Name 07/23/24 1140             Time Calculation- OT    OT Start Time 0906  -MR      OT Received On 07/23/24  -MR      OT Goal Re-Cert Due Date 08/02/24  -MR         Timed Charges    35409 - OT Self Care/Mgmt Minutes 8  -MR         Untimed Charges    OT Eval/Re-eval Minutes 46  -MR         Total Minutes    Timed Charges Total Minutes 8  -MR      Untimed Charges Total Minutes 46  -MR       Total Minutes 54  -MR                User Key  (r) = Recorded By, (t) = Taken By, (c) = Cosigned By      Initials Name Provider Type    MR Emerald Shukla OT Occupational Therapist                  Therapy Charges for Today       Code Description Service Date Service Provider Modifiers Qty    [id number]  OT SELF CARE/MGMT/TRAIN EA 15 MIN 7/23/2024 Emerald Shukla OT GO 1    [id number]  OT EVAL MOD COMPLEXITY 4 7/23/2024 Emerald Shukla OT GO 1                 Emerald Shukla OT  7/23/2024

## 2024-07-23 NOTE — CASE MANAGEMENT/SOCIAL WORK
Continued Stay Note  [facility]     Patient Name: Sylvia Jalloh  MRN: [id number]  Today's Date: 7/23/2024    Admit Date: 7/22/2024    Plan: Home   Discharge Plan       Row Name 07/23/24 0846       Plan    Plan Home    Patient/Family in Agreement with Plan yes    Plan Comments Spoke to patient at bedside. Plan is home. Family will transport. Patient denies any discharge needs. CM will continue to follow.    Final Discharge Disposition Code 01 - home or self-care                   Discharge Codes    No documentation.                       Afshin Kauffman RN

## 2024-07-23 NOTE — THERAPY EVALUATION
Patient Name: Sylvia Jalloh  : 1934    MRN: [id number]                              Today's Date: 2024       Admit Date: 2024    Visit Dx:     ICD-10-CM ICD-9-CM   1. Double vision  H53.2 368.2   2. Ataxia  R27.0 781.3     Patient Active Problem List   Diagnosis    Orthopnea    Hypertensive Emergency on chronic Essential hypertension    Hypothyroidism    Bundle branch block, left    CKD (chronic kidney disease) stage 3, GFR 30-59 ml/min    Acute systolic heart failure    Non-ischemic cardiomyopathy    Acute hypoxemic respiratory failure    TIA (transient ischemic attack)     Past Medical History:   Diagnosis Date    Chronic kidney disease     Disease of thyroid gland     Gallstone pancreatitis 2015    Hypertension     Systolic heart failure secondary to idiopathic cardiomyopathy      Past Surgical History:   Procedure Laterality Date    APPENDECTOMY      BLADDER SUSPENSION      CARDIAC CATHETERIZATION N/A 2017    Procedure: Coronary angiography;  Surgeon: Thiago Vega MD;  Location:  [facility];  Service:     CHOLECYSTECTOMY      HYSTERECTOMY        General Information       Row Name 24 1537          Physical Therapy Time and Intention    Document Type evaluation  -CK (r) HM (t) CK (c)     Mode of Treatment individual therapy;physical therapy  -CK (r) HM (t) CK (c)       Row Name 24 1537          General Information    Patient Profile Reviewed yes  -CK (r) HM (t) CK (c)     Prior Level of Function independent:;all household mobility;community mobility;gait;bed mobility;ADL's  PTA Pt I w/ ADLs. Utilizes a rollator for mobility at night otherwise no AD utilized. Pt denies any recent falls. Pt has a rollator< SPC and bath bench. Regular tub w/ difficulty getting in and out at baseline. PMH of scoliosis at baseline  -CK (r) HM (t) CK (c)     Existing Precautions/Restrictions fall;other (see comments)  Diplopia (improvement noted w/ occlusion)  -CK (r) HM (t)  CK (c)     Barriers to Rehab medically complex;previous functional deficit  -CK (r) HM (t) CK (c)       Row Name 07/23/24 1537          Living Environment    People in Home alone;other (see comments)  Dtr & Granddtr reside close by and 2 sisters visit regularly  -CK (r) HM (t) CK (c)       Row Name 07/23/24 1537          Home Main Entrance    Number of Stairs, Main Entrance two  -CK (r) HM (t) CK (c)     Stair Railings, Main Entrance railing on left side (ascending)  -CK (r) HM (t) CK (c)       Row Name 07/23/24 1537          Stairs Within Home, Primary    Number of Stairs, Within Home, Primary none  -CK (r) HM (t) CK (c)       Row Name 07/23/24 1537          Cognition    Orientation Status (Cognition) oriented to;person;verbal cues/prompts needed for orientation;place;situation;time  -CK (r) HM (t) CK (c)       Row Name 07/23/24 1537          Safety Issues, Functional Mobility    Safety Issues Affecting Function (Mobility) awareness of need for assistance;insight into deficits/self-awareness;safety precaution awareness;safety precautions follow-through/compliance;positioning of assistive device;sequencing abilities;problem-solving  -CK (r) HM (t) CK (c)     Impairments Affecting Function (Mobility) balance;strength;endurance/activity tolerance;visual/perceptual;coordination  -CK (r) HM (t) CK (c)     Comment, Safety Issues/Impairments (Mobility) Pt lethargic throughout session with increased time to follow commands  -CK (r) HM (t) CK (c)               User Key  (r) = Recorded By, (t) = Taken By, (c) = Cosigned By      Initials Name Provider Type    CK Argelia Mendez, PT Physical Therapist    HM Jackelyn Hilton, PT Student PT Student                   Mobility       Row Name 07/23/24 1616          Bed Mobility    Bed Mobility supine-sit  -CK (r) HM (t) CK (c)     Supine-Sit [place] (Bed Mobility) contact guard;verbal cues  -CK (r) HM (t) CK (c)     Assistive Device (Bed Mobility) head of bed elevated;bed  rails  -CK (r) HM (t) CK (c)     Comment, (Bed Mobility) Pt did not require physical assistance but required increased time to complete task  -CK (r) HM (t) CK (c)       Row Name 07/23/24 1616          Transfers    Comment, (Transfers) STS x 1 (1x from EOB, 1x from BSC) VC for hand placement and sequencing as well as multiple VC throughout to keep head upright.  -CK (r) HM (t) CK (c)       Row Name 07/23/24 1616          Bed-Chair Transfer    Bed-Chair [place] (Transfers) minimum assist (75% patient effort);verbal cues  -CK (r) HM (t) CK (c)     Assistive Device (Bed-Chair Transfers) walker, front-wheeled  -CK (r) HM (t) CK (c)     Comment, (Bed-Chair Transfer) Pt required Min A to complete bed <> BSC transfer in order to get there quickly secondary to sudden urge to urinate.  -CK (r) HM (t) CK (c)       Row Name 07/23/24 1616          Sit-Stand Transfer    Sit-Stand [place] (Transfers) contact guard;verbal cues  -CK (r) HM (t) CK (c)     Assistive Device (Sit-Stand Transfers) walker, front-wheeled  -CK (r) HM (t) CK (c)     Comment, (Sit-Stand Transfer) VC for hand placement and sequencing as well aas VC to stand upright and keep head upright. Pt unable to achieve full upright position secondary to scoliosis.  -CK (r) HM (t) CK (c)       Row Name 07/23/24 1616          Gait/Stairs (Locomotion)    [place] Level (Gait) minimum assist (75% patient effort);verbal cues;1 person to manage equipment  -CK (r) HM (t) CK (c)     Assistive Device (Gait) walker, front-wheeled  -CK (r) HM (t) CK (c)     Distance in Feet (Gait) 150  -CK (r) HM (t) CK (c)     Deviations/Abnormal Patterns (Gait) bilateral deviations;earlene decreased;gait speed decreased;stride length decreased;weight shifting decreased  -CK (r) HM (t) CK (c)     Bilateral Gait Deviations forward flexed posture;heel strike decreased  -CK (r) HM (t) CK (c)     Left Sided Gait Deviations Trendelenburg sign  -CK (r) HM (t) CK (c)     Right Sided Gait  Deviations leans right  -CK (r) HM (t) CK (c)     [place] Level (Stairs) not tested  -CK (r) HM (t) CK (c)     Comment, (Gait/Stairs) Pt able to ambulate with a step through gait pattern with forward flex posture that is present at baseline. Pt with on instance of hand slipping off on walker but pt with ability to quickly self correct without LOB. Pt required VC to keep walker close throughout ambulation. Pt showed good ability to scan environment despite eye patch.  -CK (r) HM (t) CK (c)               User Key  (r) = Recorded By, (t) = Taken By, (c) = Cosigned By      Initials Name Provider Type    CK Argelia Mendez, PT Physical Therapist    HM Jackelyn Hilton, PT Student PT Student                   Obj/Interventions       Row Name 07/23/24 1625          Range of Motion Comprehensive    General Range of Motion bilateral lower extremity ROM WFL  -CK (r) HM (t) CK (c)       Row Name 07/23/24 1625          Strength Comprehensive (MMT)    General Manual Muscle Testing (MMT) Assessment no strength deficits identified  -CK (r) HM (t) CK (c)     Comment, General Manual Muscle Testing (MMT) Assessment BLE 5/5  -CK (r) HM (t) CK (c)       Row Name 07/23/24 1625          Motor Skills    Motor Skills coordination  -CK (r) HM (t) CK (c)     Coordination bilateral;heel to shin;WNL  -CK (r) HM (t) CK (c)       Row Name 07/23/24 1625          Balance    Balance Assessment sitting static balance;sitting dynamic balance;sit to stand dynamic balance;standing static balance;standing dynamic balance  -CK (r) HM (t) CK (c)     Static Sitting Balance standby assist  -CK (r) HM (t) CK (c)     Dynamic Sitting Balance contact guard  -CK (r) HM (t) CK (c)     Position, Sitting Balance unsupported;sitting edge of bed  -CK (r) HM (t) CK (c)     Sit to Stand Dynamic Balance contact guard;verbal cues  -CK (r) HM (t) CK (c)     Static Standing Balance contact guard  -CK (r) HM (t) CK (c)     Dynamic Standing Balance minimal  assist;verbal cues  -CK (r) HM (t) CK (c)     Position/Device Used, Standing Balance supported;walker, front-wheeled  -CK (r) HM (t) CK (c)     Balance Interventions sitting;standing;sit to stand;occupation based/functional task  -CK (r) HM (t) CK (c)     Comment, Balance No LOB or knee buckling.  -CK (r) HM (t) CK (c)       Row Name 07/23/24 1625          Sensory Assessment (Somatosensory)    Sensory Assessment (Somatosensory) LE sensation intact  -CK (r) HM (t) CK (c)               User Key  (r) = Recorded By, (t) = Taken By, (c) = Cosigned By      Initials Name Provider Type    CK Argelia Mendez, PT Physical Therapist    Jackelyn Orlando, PT Student PT Student                   Goals/Plan       Row Name 07/23/24 1630          Bed Mobility Goal 1 (PT)    Activity/Assistive Device (Bed Mobility Goal 1, PT) sit to supine/supine to sit  -CK (r) HM (t) CK (c)     [place] Level/Cues Needed (Bed Mobility Goal 1, PT) independent  -CK (r) HM (t) CK (c)     Time Frame (Bed Mobility Goal 1, PT) short term goal (STG);3 days  -CK (r) HM (t) CK (c)     Progress/Outcomes (Bed Mobility Goal 1, PT) new goal  -CK (r) HM (t) CK (c)       Row Name 07/23/24 1630          Transfer Goal 1 (PT)    Activity/Assistive Device (Transfer Goal 1, PT) sit-to-stand/stand-to-sit;bed-to-chair/chair-to-bed  -CK (r) HM (t) CK (c)     [place] Level/Cues Needed (Transfer Goal 1, PT) standby assist  -CK (r) HM (t) CK (c)     Time Frame (Transfer Goal 1, PT) long term goal (LTG);10 days  -CK (r) HM (t) CK (c)     Progress/Outcome (Transfer Goal 1, PT) new goal  -CK (r) HM (t) CK (c)       Row Name 07/23/24 1630          Gait Training Goal 1 (PT)    Activity/Assistive Device (Gait Training Goal 1, PT) gait (walking locomotion);assistive device use;walker, rolling  -CK (r) HM (t) CK (c)     [place] Level (Gait Training Goal 1, PT) standby assist  -CK (r) HM (t) CK (c)     Distance (Gait Training Goal 1, PT) 350'  -CK (r) HM (t) CK  (c)     Time Frame (Gait Training Goal 1, PT) long term goal (LTG);10 days  -CK (r) HM (t) CK (c)     Progress/Outcome (Gait Training Goal 1, PT) new goal  -CK (r) HM (t) CK (c)       Row Name 07/23/24 1630          Stairs Goal 1 (PT)    Activity/Assistive Device (Stairs Goal 1, PT) stairs, all skills;ascending stairs;descending stairs;using handrail, left;cane, straight  -CK (r) HM (t) CK (c)     [place] Level/Cues Needed (Stairs Goal 1, PT) standby assist  -CK (r) HM (t) CK (c)     Time Frame (Stairs Goal 1, PT) long term goal (LTG);10 days  -CK (r) HM (t) CK (c)     Progress/Outcome (Stairs Goal 1, PT) new goal  -CK (r) HM (t) CK (c)       Row Name 07/23/24 1630          Therapy Assessment/Plan (PT)    Planned Therapy Interventions (PT) balance training;bed mobility training;gait training;home exercise program;joint mobilization;lumbar stabilization;manual therapy techniques;motor coordination training;stretching;strengthening;stair training;ROM (range of motion);postural re-education;patient/family education;neuromuscular re-education;transfer training  -CK (r) HM (t) CK (c)               User Key  (r) = Recorded By, (t) = Taken By, (c) = Cosigned By      Initials Name Provider Type    CK Argelia Mendez, PT Physical Therapist     Jackelyn Hilton, PT Student PT Student                   Clinical Impression       Row Name 07/23/24 1626          Pain    Pretreatment Pain Rating 0/10 - no pain  -CK (r) HM (t) CK (c)     Posttreatment Pain Rating 0/10 - no pain  -CK (r) HM (t) CK (c)       Row Name 07/23/24 1626          Plan of Care Review    Plan of Care Reviewed With patient;family  -CK (r) HM (t) CK (c)     Outcome Evaluation Pt able to ambulate 150' this session with pt and family reports of quality being at baseline. Pt lethargic throughout session with increase time to follow commands. Pt presents below baseline with deficits in balance and activity tolerance. Further IPPT warranted for stair  training prior to DC. PT recommended DC to home with 24/7 assist with OPPT.  -CK (r) HM (t) CK (c)       Row Name 07/23/24 1626          Therapy Assessment/Plan (PT)    Patient/Family Therapy Goals Statement (PT) Go home  -CK (r) HM (t) CK (c)     Rehab Potential (PT) good, to achieve stated therapy goals  -CK (r) HM (t) CK (c)     Criteria for Skilled Interventions Met (PT) yes;meets criteria;skilled treatment is necessary  -CK (r) HM (t) CK (c)     Therapy Frequency (PT) daily  -CK (r) HM (t) CK (c)     Predicted Duration of Therapy Intervention (PT) 3 days  -CK (r) HM (t) CK (c)       Row Name 07/23/24 1626          Vital Signs    Pre Systolic BP Rehab 134  -CK (r) HM (t) CK (c)     Pre Treatment Diastolic BP 69  -CK (r) HM (t) CK (c)     Post Systolic BP Rehab 132  -CK (r) HM (t) CK (c)     Post Treatment Diastolic BP 62  -CK (r) HM (t) CK (c)     Pretreatment Heart Rate (beats/min) 69  -CK (r) HM (t) CK (c)     Posttreatment Heart Rate (beats/min) 69  -CK (r) HM (t) CK (c)     Pre SpO2 (%) 94  -CK (r) HM (t) CK (c)     O2 Delivery Pre Treatment room air  -CK (r) HM (t) CK (c)     Post SpO2 (%) 96  -CK (r) HM (t) CK (c)     O2 Delivery Post Treatment room air  -CK (r) HM (t) CK (c)     Pre Patient Position Supine  -CK (r) HM (t) CK (c)     Intra Patient Position Standing  -CK (r) HM (t) CK (c)     Post Patient Position Sitting  -CK (r) HM (t) CK (c)       Row Name 07/23/24 1626          Positioning and Restraints    Pre-Treatment Position in bed  -CK (r) HM (t) CK (c)     Post Treatment Position chair  -CK (r) HM (t) CK (c)     In Chair notified nsg;reclined;call light within reach;encouraged to call for assist;exit alarm on;with family/caregiver;waffle cushion;compression device;legs elevated  -CK (r) HM (t) CK (c)               User Key  (r) = Recorded By, (t) = Taken By, (c) = Cosigned By      Initials Name Provider Type    CK Argelia Mendez, PT Physical Therapist    Jackelyn Orlando, PT Student PT  Student                   Outcome Measures       Row Name 07/23/24 1631          How much help from another person do you currently need...    Turning from your back to your side while in flat bed without using bedrails? 3  -CK (r) HM (t) CK (c)     Moving from lying on back to sitting on the side of a flat bed without bedrails? 3  -CK (r) HM (t) CK (c)     Moving to and from a bed to a chair (including a wheelchair)? 3  -CK (r) HM (t) CK (c)     Standing up from a chair using your arms (e.g., wheelchair, bedside chair)? 3  -CK (r) HM (t) CK (c)     Climbing 3-5 steps with a railing? 2  -CK (r) HM (t) CK (c)     To walk in hospital room? 3  -CK (r) HM (t) CK (c)     AM-PAC 6 Clicks Score (PT) 17  -CK (r) HM (t)     Highest Level of Mobility Goal 5 --> Static standing  -CK (r) HM (t)       Row Name 07/23/24 1631          Modified [place] Scale    Pre-Stroke Modified [place] Scale 6 - Unable to determine (UTD) from the medical record documentation  -CK (r) HM (t) CK (c)     Modified [place] Scale 3 - Moderate disability.  Requiring some help, but able to walk without assistance.  -CK (r) HM (t) CK (c)       Row Name 07/23/24 1631 07/23/24 1138       Functional Assessment    Outcome Measure Options AM-PAC 6 Clicks Basic Mobility (PT);Modified [place]  -CK (r) HM (t) CK (c) AM-PAC 6 Clicks Daily Activity (OT)  -MR              User Key  (r) = Recorded By, (t) = Taken By, (c) = Cosigned By      Initials Name Provider Type    Emerald De La Garza, OT Occupational Therapist    CK Argelia Mendez, PT Physical Therapist    HM Jackelyn Hilton, PT Student PT Student                                 Physical Therapy Education       Title: PT OT SLP Therapies (In Progress)       Topic: Physical Therapy (In Progress)       Point: Mobility training (Done)       Learning Progress Summary             Patient Acceptance, E, VU by  at 7/23/2024 1632    Comment: Pt educated on safe mobility with visual scanning of environment with eye  patch                         Point: Home exercise program (Not Started)       Learner Progress:  Not documented in this visit.              Point: Body mechanics (Done)       Learning Progress Summary             Patient Acceptance, E, VU by  at 7/23/2024 1632    Comment: Pt educated on safe mobility with visual scanning of environment with eye patch                         Point: Precautions (Done)       Learning Progress Summary             Patient Acceptance, E, VU by  at 7/23/2024 1632    Comment: Pt educated on safe mobility with visual scanning of environment with eye patch                                         User Key       Initials Effective Dates Name Provider Type Discipline     05/07/24 -  Jackelyn Hilton, PT Student PT Student PT                  PT Recommendation and Plan  Planned Therapy Interventions (PT): balance training, bed mobility training, gait training, home exercise program, joint mobilization, lumbar stabilization, manual therapy techniques, motor coordination training, stretching, strengthening, stair training, ROM (range of motion), postural re-education, patient/family education, neuromuscular re-education, transfer training  Plan of Care Reviewed With: patient, family  Outcome Evaluation: Pt able to ambulate 150' this session with pt and family reports of quality being at baseline. Pt lethargic throughout session with increase time to follow commands. Pt presents below baseline with deficits in balance and activity tolerance. Further IPPT warranted for stair training prior to DC. PT recommended DC to home with 24/7 assist with OPPT.     Time Calculation:   PT Evaluation Complexity  History, PT Evaluation Complexity: 1-2 personal factors and/or comorbidities  Examination of Body Systems (PT Eval Complexity): total of 3 or more elements  Clinical Presentation (PT Evaluation Complexity): stable  Clinical Decision Making (PT Evaluation Complexity): low complexity  Overall  Complexity (PT Evaluation Complexity): low complexity     PT Charges       Row Name 07/23/24 1447             Time Calculation    Start Time 1447  -CK (r) HM (t) CK (c)      PT Received On 07/23/24  -CK (r) HM (t) CK (c)      PT Goal Re-Cert Due Date 08/02/24  -CK (r) HM (t) CK (c)         Timed Charges    09044 - Gait Training Minutes  10  -CK (r) HM (t) CK (c)         Untimed Charges    PT Eval/Re-eval Minutes 55  -CK (r) HM (t) CK (c)         Total Minutes    Timed Charges Total Minutes 10  -CK (r) HM (t)      Untimed Charges Total Minutes 55  -CK (r) HM (t)       Total Minutes 65  -CK (r) HM (t)                User Key  (r) = Recorded By, (t) = Taken By, (c) = Cosigned By      Initials Name Provider Type    CK Argelia Mendez, PT Physical Therapist     Jackelyn Hilton, PT Student PT Student                  Therapy Charges for Today       Code Description Service Date Service Provider Modifiers Qty    98619713568 HC GAIT TRAINING EA 15 MIN 7/23/2024 Jackelyn Hilton, PT Student GP 1    [id number] HC PT EVAL LOW COMPLEXITY 4 7/23/2024 Jackelyn Hilton, PT Student GP 1            PT G-Codes  Outcome Measure Options: AM-PAC 6 Clicks Basic Mobility (PT), Modified [place]  AM-PAC 6 Clicks Score (PT): 17  AM-PAC 6 Clicks Score (OT): 15  Modified [place] Scale: 3 - Moderate disability.  Requiring some help, but able to walk without assistance.  PT Discharge Summary  Anticipated Discharge Disposition (PT): home with outpatient therapy services, home with 24/7 care    Jackelyn Hilton, PT Student  7/23/2024

## 2024-07-23 NOTE — PROGRESS NOTES
[facility]  PROGRESS NOTE    Patient Name: Sylvia Jalloh  : 1934  MRN: [id number]    Date of Admission: 2024  Primary Care Physician: Bart Pop APRN    Subjective   Subjective     CC:  F/u stroke    HPI:  Patient sitting up in bed. Continues to have double vision. No other focal deficits or weakness that she was aware of, although says she has not been up yet.      Objective   Objective     Vital Signs:   Temp:  [97.8 °F (36.6 °C)-98.7 °F (37.1 °C)] 97.8 °F (36.6 °C)  Heart Rate:  [67-78] 71  Resp:  [16-18] 18  BP: (115-155)/(56-99) 141/60     Physical Exam:  Constitutional: No acute distress, awake, alert  HENT: NCAT, mucous membranes moist  Respiratory: Clear to auscultation bilaterally, respiratory effort normal   Cardiovascular: RRR, no murmurs, rubs, or gallops  Gastrointestinal: soft, nontender, nondistended  Musculoskeletal: No bilateral ankle edema  Psychiatric: Appropriate affect, cooperative  Neurologic: Oriented x 3, speech clear, no focal deficits  Skin: No rashes      Results Reviewed:  LAB RESULTS:      Lab 24  0333 24  1017 24  1009 24  1008   WBC 14.23* 14.37*  --   --    HEMOGLOBIN 12.0 11.8*  --   --    HEMOGLOBIN, POC  --   --  11.6*  --    HEMATOCRIT 36.5 35.1  --   --    HEMATOCRIT POC  --   --  34*  --    PLATELETS 206 203  --   --    NEUTROS ABS 10.55* 10.76*  --   --    IMMATURE GRANS (ABS) 0.05 0.05  --   --    LYMPHS ABS 2.04 2.40  --   --    MONOS ABS 1.38* 0.99*  --   --    EOS ABS 0.13 0.09  --   --    MCV 98.4* 96.7  --   --    PROTIME  --   --   --  14.5   APTT  --  44.4*  --   --          Lab 24  0333 24  1018 24  1009   SODIUM 136 136  --    POTASSIUM 4.5 4.2  --    CHLORIDE 102 101  --    CO2 22.0 21.0*  --    ANION GAP 12.0 14.0  --    BUN 24* 22  --    CREATININE 1.55* 1.51* 1.60*   EGFR 31.9* 32.9* 30.7*   GLUCOSE 104* 109*  --    CALCIUM 8.7 8.9  --     HEMOGLOBIN A1C 5.00  --   --          Lab 07/23/24  0333 07/22/24  1018   TOTAL PROTEIN 6.7 7.2   ALBUMIN 3.5 3.8   GLOBULIN 3.2 3.4   ALT (SGPT) 9 9  9   AST (SGOT) 22 21  23   BILIRUBIN 0.8 0.8   ALK PHOS 72 77         Lab 07/22/24  1306 07/22/24  1018 07/22/24  1008   HSTROP T 59* 54*  --    PROTIME  --   --  14.5   INR  --   --  1.2         Lab 07/23/24  0333   CHOLESTEROL 118   LDL CHOL 47   HDL CHOL 53   TRIGLYCERIDES 98             Brief Urine Lab Results  (Last result in the past 365 days)        Color   Clarity   Blood   Leuk Est   Nitrite   Protein   CREAT   Urine HCG        07/22/24 1146 Yellow   Clear   Trace   Moderate (2+)   Negative   Negative                   Microbiology Results Abnormal       None            MRI Brain Without Contrast    Result Date: 7/22/2024  MRI BRAIN WO CONTRAST Date of Exam: 7/22/2024 8:35 PM EDT Indication: Stroke, follow up Acute stroke suspected.  Comparison: None available. Technique:  Routine multiplanar/multisequence sequence images of the brain were obtained without contrast administration. Findings: There is restricted diffusion in the anteromedial left thalamus. There is a punctate focus of restricted diffusion in the high anterior left frontal white matter. There is no suspicious susceptibility. The thalamic diffusion abnormality is associated with increased T2 signal. There are multifocal T2 signal hyperintensities in the supratentorial white matter not associated with restricted diffusion, compatible with chronic microvascular ischemic change. No cortical signal abnormality is demonstrated. Major intracranial flow voids are present. Cerebellar tonsils are in normal position. There is no abnormal extra-axial fluid collection.     Impression: Impression: 1. Acute left thalamic infarct, and punctate white matter infarct in the high anterior left frontal lobe 2. Chronic small vessel ischemic white matter changes Electronically Signed: Alvarez León  7/22/2024  9:33 PM EDT  Workstation ID: [id number]    Adult Transthoracic Echo Complete W/ Cont if Necessary Per Protocol (With Agitated Saline)    Result Date: 7/22/2024    Left ventricular systolic function is low normal. Left ventricular ejection fraction appears to be 51 - 55%.   Left ventricular wall thickness is consistent with borderline concentric hypertrophy.   Left ventricular diastolic function is consistent with (grade Ia w/high LAP) impaired relaxation.   Left atrial volume is severely increased.   Mild aortic valve stenosis is present. Aortic valve area is 1.1 cm2.   Peak velocity of the flow distal to the aortic valve is 270 cm/s. Aortic valve maximum pressure gradient is 29 mmHg. Aortic valve mean pressure gradient is 16 mmHg.   Estimated right ventricular systolic pressure from tricuspid regurgitation is normal (<35 mmHg). Calculated right ventricular systolic pressure from tricuspid regurgitation is 26 mmHg.   There is a left pleural effusion. There is a right pleural effusion.   Mild to moderate mitral valve regurgitation EF slightly improved compared to previous study     CT Abdomen Pelvis Without Contrast    Result Date: 7/22/2024  CT ABDOMEN PELVIS WO CONTRAST Date of Exam: 7/22/2024 1:58 PM EDT Indication: back pain/UTI. Comparison: None available. Technique: Axial CT images were obtained of the abdomen and pelvis without the administration of contrast. Reconstructed coronal and sagittal images were also obtained. Automated exposure control and iterative construction methods were used. Findings: LOWER THORAX: There is bilateral lower lobe bronchial wall thickening with areas of irregular consolidation and mucous plugging. Correlate for signs of pulmonary infection either acute or chronic. There is a thin anterior pericardial effusion measuring up to 7 mm in thickness. There is heavy mitral annular calcification. LIVER:  Unremarkable parenchyma without focal lesion. BILIARY/GALLBLADDER: Cholecystectomy.  SPLEEN:  Unremarkable PANCREAS:  Unremarkable ADRENAL: There is generalized left adrenal thickening. KIDNEYS:  Unremarkable parenchyma with no solid mass identified. There is contrast within the collecting system and ureters related to recent contrasted CT studies. No obstruction.  No calculus identified. GASTROINTESTINAL/MESENTERY: There is a moderate-sized sliding hiatal hernia no evidence of obstruction nor inflammation.  AORTA/IVC:  Normal caliber. RETROPERITONEUM/LYMPH NODES:  Unremarkable REPRODUCTIVE: Hysterectomy. There is contrast within the vagina presumably refluxed during micturition although vesicovaginal fistula not excluded. Correlate with symptoms. BLADDER:  Unremarkable OSSEUS STRUCTURES: Spinal degenerative changes without acute osseous process identified.     Impression: Impression: 1.Bilateral lower lung airspace disease with areas of mucous endobronchial plugging. Correlate for signs of acute or chronic infection/pneumonia. 2.There is contrast within the vagina either refluxed during micturition or result of vesicovaginal fistula. Correlate with symptoms. 3.Other incidental nonemergent findings as detailed above. Electronically Signed: Julian Bishop MD  7/22/2024 2:20 PM EDT  Workstation ID: [id number]    XR Chest 1 View    Result Date: 7/22/2024  XR CHEST 1 VW Date of Exam: 7/22/2024 10:05 AM EDT Indication: Acute Stroke Protocol (onset < 12 hrs) Comparison: 7/7/2020 Findings: Heart size is within normal limits. Pulmonary vascular markings appear normal. There is a right-sided pleural effusion and right basilar atelectasis or airspace disease in the right costophrenic sulcus. There is airspace disease in the left base in the retrocardiac region.     Impression: Impression: 1. Small right pleural effusion with patchy bibasilar airspace disease. Electronically Signed: Domingo Mills MD  7/22/2024 10:58 AM EDT  Workstation ID: [id number]    CT Angiogram Head w AI Analysis of LVO    Result Date:  7/22/2024  CT ANGIOGRAM HEAD W AI ANALYSIS OF LVO, CT ANGIOGRAM NECK, CT CEREBRAL PERFUSION W WO CONTRAST Date of Exam: 7/22/2024 10:10 AM EDT Indication: Neuro Deficit, acute, Stroke suspected Neuro deficit, acute stroke suspected. Comparison: Noncontrast head CT from today Technique: CTA of the head was performed after the uneventful intravenous administration of 125 mL Isovue-370. Reconstructed coronal and sagittal images were also obtained. In addition, a 3-D volume rendered image was created for interpretation. Automated exposure control and iterative reconstruction methods were used. FINDINGS: Vascular Findings: Atherosclerotic plaque within the right carotid bifurcation and right carotid siphon. Focal plaque within the right proximal ICA with approximately 40 to 50% focal stenosis 1.5 cm beyond its origin. The right common carotid, remainder of the right ICA, middle cerebral, anterior cerebral, vertebral, and posterior cerebral arteries are patent without abrupt cut off. 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 There is suggestion of a 2 mm saccular aneurysm seen about the junction of the right supraclinoid ICA/P-comm (series 5, images 237-238). Atherosclerotic plaque within the left carotid bifurcation and left carotid siphon. The left common carotid, internal carotid, middle cerebral, anterior cerebral, vertebral, and posterior cerebral arteries are patent without abrupt cut off. Basilar artery appears patent and appears unremarkable. Non-vascular Findings: For description of nonvascular intracranial findings, please refer to the noncontrast head CT performed the same date. No acute abnormality is identified within the visualized soft tissue or bony structures of the neck. The visualized lung apices are clear. CT Perfusion: CBF (<30%) volume: 0 mL Tmax (>6.0s) volume: 0 mL Mismatch volume: 0 mL Mismatch ratio: None      Impression: 1.No acute abnormality  identified within the large arteries of the head or neck. 2.Focal plaque within the right proximal ICA with approximately 40 to 50% focal stenosis 1.5 cm beyond its origin. 3.No significant stenosis of the left internal carotid artery. 4.There is suggestion of a 2 mm saccular aneurysm seen about the junction of the right supraclinoid ICA/P-comm (series 5, images 237-238). 5.CT perfusion study demonstrates no territorial ischemia or core infarct. Electronically Signed: Swapnil Mcfarland MD  7/22/2024 10:50 AM EDT  Workstation ID: [id number]    CT Angiogram Neck    Result Date: 7/22/2024  CT ANGIOGRAM HEAD W AI ANALYSIS OF LVO, CT ANGIOGRAM NECK, CT CEREBRAL PERFUSION W WO CONTRAST Date of Exam: 7/22/2024 10:10 AM EDT Indication: Neuro Deficit, acute, Stroke suspected Neuro deficit, acute stroke suspected. Comparison: Noncontrast head CT from today Technique: CTA of the head was performed after the uneventful intravenous administration of 125 mL Isovue-370. Reconstructed coronal and sagittal images were also obtained. In addition, a 3-D volume rendered image was created for interpretation. Automated exposure control and iterative reconstruction methods were used. FINDINGS: Vascular Findings: Atherosclerotic plaque within the right carotid bifurcation and right carotid siphon. Focal plaque within the right proximal ICA with approximately 40 to 50% focal stenosis 1.5 cm beyond its origin. The right common carotid, remainder of the right ICA, middle cerebral, anterior cerebral, vertebral, and posterior cerebral arteries are patent without abrupt cut off. 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 There is suggestion of a 2 mm saccular aneurysm seen about the junction of the right supraclinoid ICA/P-comm (series 5, images 237-238). Atherosclerotic plaque within the left carotid bifurcation and left carotid siphon. The left common carotid, internal carotid, middle cerebral, anterior  cerebral, vertebral, and posterior cerebral arteries are patent without abrupt cut off. Basilar artery appears patent and appears unremarkable. Non-vascular Findings: For description of nonvascular intracranial findings, please refer to the noncontrast head CT performed the same date. No acute abnormality is identified within the visualized soft tissue or bony structures of the neck. The visualized lung apices are clear. CT Perfusion: CBF (<30%) volume: 0 mL Tmax (>6.0s) volume: 0 mL Mismatch volume: 0 mL Mismatch ratio: None      Impression: 1.No acute abnormality identified within the large arteries of the head or neck. 2.Focal plaque within the right proximal ICA with approximately 40 to 50% focal stenosis 1.5 cm beyond its origin. 3.No significant stenosis of the left internal carotid artery. 4.There is suggestion of a 2 mm saccular aneurysm seen about the junction of the right supraclinoid ICA/P-comm (series 5, images 237-238). 5.CT perfusion study demonstrates no territorial ischemia or core infarct. Electronically Signed: Swapnil Mcfarland MD  7/22/2024 10:50 AM EDT  Workstation ID: [id number]    CT CEREBRAL PERFUSION WITH & WITHOUT CONTRAST    Result Date: 7/22/2024  CT ANGIOGRAM HEAD W AI ANALYSIS OF LVO, CT ANGIOGRAM NECK, CT CEREBRAL PERFUSION W WO CONTRAST Date of Exam: 7/22/2024 10:10 AM EDT Indication: Neuro Deficit, acute, Stroke suspected Neuro deficit, acute stroke suspected. Comparison: Noncontrast head CT from today Technique: CTA of the head was performed after the uneventful intravenous administration of 125 mL Isovue-370. Reconstructed coronal and sagittal images were also obtained. In addition, a 3-D volume rendered image was created for interpretation. Automated exposure control and iterative reconstruction methods were used. FINDINGS: Vascular Findings: Atherosclerotic plaque within the right carotid bifurcation and right carotid siphon. Focal plaque within the right proximal ICA with  approximately 40 to 50% focal stenosis 1.5 cm beyond its origin. The right common carotid, remainder of the right ICA, middle cerebral, anterior cerebral, vertebral, and posterior cerebral arteries are patent without abrupt cut off. 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 There is suggestion of a 2 mm saccular aneurysm seen about the junction of the right supraclinoid ICA/P-comm (series 5, images 237-238). Atherosclerotic plaque within the left carotid bifurcation and left carotid siphon. The left common carotid, internal carotid, middle cerebral, anterior cerebral, vertebral, and posterior cerebral arteries are patent without abrupt cut off. Basilar artery appears patent and appears unremarkable. Non-vascular Findings: For description of nonvascular intracranial findings, please refer to the noncontrast head CT performed the same date. No acute abnormality is identified within the visualized soft tissue or bony structures of the neck. The visualized lung apices are clear. CT Perfusion: CBF (<30%) volume: 0 mL Tmax (>6.0s) volume: 0 mL Mismatch volume: 0 mL Mismatch ratio: None      Impression: 1.No acute abnormality identified within the large arteries of the head or neck. 2.Focal plaque within the right proximal ICA with approximately 40 to 50% focal stenosis 1.5 cm beyond its origin. 3.No significant stenosis of the left internal carotid artery. 4.There is suggestion of a 2 mm saccular aneurysm seen about the junction of the right supraclinoid ICA/P-comm (series 5, images 237-238). 5.CT perfusion study demonstrates no territorial ischemia or core infarct. Electronically Signed: Swapnil Mcfarland MD  7/22/2024 10:50 AM EDT  Workstation ID: [id number]    CT Head Without Contrast Stroke Protocol    Result Date: 7/22/2024  CT HEAD WO CONTRAST STROKE PROTOCOL Date of Exam: 7/22/2024 10:00 AM EDT Indication: Neuro deficit, acute, stroke suspected Neuro Deficit, acute, Stroke  suspected. Comparison: None available. Technique: Axial CT images were obtained of the head without contrast administration.  Reconstructed coronal images were also obtained. Automated exposure control and iterative construction methods were used. Scan Time: 10:05 a.m. Study reviewed by the stroke team at time of scan. Report dictated and signed at 10:38 a.m., 7/22/2024. Findings: The calvarium appears intact. Included paranasal sinuses and mastoids appear clear. There is age-appropriate generalized cerebral atrophy. No hemorrhage, contusion or edema is seen. There is no evidence of acute infarction, mass or mass effect, hydrocephalus, or abnormal extra-axial collection. There is symmetric chronic central white matter change, typical of microvascular leukoencephalopathy.     Impression: Impression: Chronic appearing changes of the aging brain. No evidence of acute intracranial disease. Electronically Signed: Arpit Trevino MD  7/22/2024 10:38 AM EDT  Workstation ID: [id number]     Results for orders placed during the hospital encounter of 07/22/24    Adult Transthoracic Echo Complete W/ Cont if Necessary Per Protocol (With Agitated Saline)    Interpretation Summary    Left ventricular systolic function is low normal. Left ventricular ejection fraction appears to be 51 - 55%.    Left ventricular wall thickness is consistent with borderline concentric hypertrophy.    Left ventricular diastolic function is consistent with (grade Ia w/high LAP) impaired relaxation.    Left atrial volume is severely increased.    Mild aortic valve stenosis is present. Aortic valve area is 1.1 cm2.    Peak velocity of the flow distal to the aortic valve is 270 cm/s. Aortic valve maximum pressure gradient is 29 mmHg. Aortic valve mean pressure gradient is 16 mmHg.    Estimated right ventricular systolic pressure from tricuspid regurgitation is normal (<35 mmHg). Calculated right ventricular systolic pressure from tricuspid regurgitation is 26  mmHg.    There is a left pleural effusion. There is a right pleural effusion.    Mild to moderate mitral valve regurgitation    EF slightly improved compared to previous study      Current medications:  Scheduled Meds:amLODIPine, 2.5 mg, Oral, Q24H  aspirin, 81 mg, Oral, Daily   Or  aspirin, 300 mg, Rectal, Daily  atorvastatin, 80 mg, Oral, Nightly  carvedilol, 12.5 mg, Oral, BID With Meals  cefTRIAXone, 1,000 mg, Intravenous, Q24H  cetirizine, 10 mg, Oral, Daily  clopidogrel, 75 mg, Oral, Daily  doxycycline, 100 mg, Oral, Q12H  estradiol, 0.5 mg, Oral, Daily  furosemide, 20 mg, Oral, Daily  guaiFENesin, 600 mg, Oral, Q12H  heparin (porcine), 5,000 Units, Subcutaneous, Q8H  levothyroxine, 75 mcg, Oral, Daily  multivitamin with minerals, 1 tablet, Oral, Daily  sacubitril-valsartan, 1 tablet, Oral, Q12H  sodium chloride, 10 mL, Intravenous, Q12H  sodium chloride, 10 mL, Intravenous, Q12H  spironolactone, 25 mg, Oral, Daily      Continuous Infusions:   PRN Meds:.  acetaminophen    senna-docusate sodium **AND** polyethylene glycol **AND** bisacodyl **AND** bisacodyl    hydrOXYzine    meclizine    nitroglycerin    ondansetron ODT **OR** ondansetron    sodium chloride    sodium chloride    sodium chloride    sodium chloride    sodium chloride    Assessment & Plan   Assessment & Plan     Active Hospital Problems    Diagnosis  POA    **TIA (transient ischemic attack) [G45.9]  Yes    Non-ischemic cardiomyopathy [I42.8]  Yes    CKD (chronic kidney disease) stage 3, GFR 30-59 ml/min [N18.30]  Yes    Hypothyroidism [E03.9]  Yes      Resolved Hospital Problems   No resolved problems to display.        Brief Hospital Course to date:  Sylvia Jalloh is a 89 y.o. female with history of NICM/HFrEF, CKD III, HTN, hypothyroidism here with acute dizziness/double vision..     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  Double Vision  -initial CT imaging with R ICA plaque/stenosis, 2 mm saccular aneurysm  -CT perfusion without core  infarction  -MRI with acute left thalamic infarct, and punctate white matter infarct in the high anterior left frontal lobe   -on DAPT/statin  -TT pending  -Stroke Neurology to follow for additional recommendations  -PT/OT, apply eye patch to help w/double vision     Recent UTI w/abnormal repeat UA  Leukocytosis  -rocephin  -recent macrobid use     Findings suspicious for PNA per CT  -rocephin/doxy  -pulmonary toilet     History of NICM  History of HFrEF  -continue BB/entresto/diuretics  -previously prescribed Farxiga for either HFrEF, or CKD per family but instructed to stop, but family notes she may have continued taking it     CKD  -monitor renal function    Expected Discharge Location and Transportation: TBD, pending PT/OT evlas  Expected Discharge   Expected discharge date/ time has not been documented.     VTE Prophylaxis:  Pharmacologic & mechanical VTE prophylaxis orders are present.         AM-PAC 6 Clicks Score (PT): 17 (07/22/24 2017)    CODE STATUS:   Code Status and Medical Interventions:   Ordered at: 07/22/24 1302     Code Status (Patient has no pulse and is not breathing):    CPR (Attempt to Resuscitate)     Medical Interventions (Patient has pulse or is breathing):    Full Support       Hortencia Ivey, DO  07/23/24

## 2024-07-24 LAB — BACTERIA SPEC AEROBE CULT: ABNORMAL

## 2024-07-24 PROCEDURE — 99232 SBSQ HOSP IP/OBS MODERATE 35: CPT | Performed by: INTERNAL MEDICINE

## 2024-07-24 PROCEDURE — 25010000002 HEPARIN (PORCINE) PER 1000 UNITS: Performed by: HOSPITALIST

## 2024-07-24 PROCEDURE — 25010000002 CEFTRIAXONE PER 250 MG: Performed by: HOSPITALIST

## 2024-07-24 PROCEDURE — 99232 SBSQ HOSP IP/OBS MODERATE 35: CPT | Performed by: NURSE PRACTITIONER

## 2024-07-24 RX ADMIN — SACUBITRIL AND VALSARTAN 1 TABLET: 49; 51 TABLET, FILM COATED ORAL at 20:50

## 2024-07-24 RX ADMIN — CARVEDILOL 12.5 MG: 12.5 TABLET, FILM COATED ORAL at 17:28

## 2024-07-24 RX ADMIN — LEVOTHYROXINE SODIUM 75 MCG: 0.07 TABLET ORAL at 06:42

## 2024-07-24 RX ADMIN — ACETAMINOPHEN 650 MG: 325 TABLET ORAL at 16:42

## 2024-07-24 RX ADMIN — DOXYCYCLINE 100 MG: 100 CAPSULE ORAL at 09:02

## 2024-07-24 RX ADMIN — HEPARIN SODIUM 5000 UNITS: 5000 INJECTION INTRAVENOUS; SUBCUTANEOUS at 14:21

## 2024-07-24 RX ADMIN — ACETAMINOPHEN 650 MG: 325 TABLET ORAL at 22:39

## 2024-07-24 RX ADMIN — GUAIFENESIN 600 MG: 600 TABLET, EXTENDED RELEASE ORAL at 20:50

## 2024-07-24 RX ADMIN — HEPARIN SODIUM 5000 UNITS: 5000 INJECTION INTRAVENOUS; SUBCUTANEOUS at 22:39

## 2024-07-24 RX ADMIN — CETIRIZINE HYDROCHLORIDE 10 MG: 10 TABLET, FILM COATED ORAL at 09:01

## 2024-07-24 RX ADMIN — ATORVASTATIN CALCIUM 80 MG: 40 TABLET, FILM COATED ORAL at 20:50

## 2024-07-24 RX ADMIN — DOXYCYCLINE 100 MG: 100 CAPSULE ORAL at 20:49

## 2024-07-24 RX ADMIN — HEPARIN SODIUM 5000 UNITS: 5000 INJECTION INTRAVENOUS; SUBCUTANEOUS at 06:42

## 2024-07-24 RX ADMIN — SACUBITRIL AND VALSARTAN 1 TABLET: 49; 51 TABLET, FILM COATED ORAL at 09:02

## 2024-07-24 RX ADMIN — AMLODIPINE BESYLATE 2.5 MG: 2.5 TABLET ORAL at 09:02

## 2024-07-24 RX ADMIN — Medication 1 TABLET: at 09:01

## 2024-07-24 RX ADMIN — SPIRONOLACTONE 25 MG: 25 TABLET ORAL at 09:02

## 2024-07-24 RX ADMIN — SODIUM CHLORIDE 1000 MG: 900 INJECTION INTRAVENOUS at 14:22

## 2024-07-24 RX ADMIN — CARVEDILOL 12.5 MG: 12.5 TABLET, FILM COATED ORAL at 09:02

## 2024-07-24 RX ADMIN — FUROSEMIDE 20 MG: 20 TABLET ORAL at 09:01

## 2024-07-24 RX ADMIN — ESTRADIOL 0.5 MG: 0.5 TABLET ORAL at 09:01

## 2024-07-24 RX ADMIN — ASPIRIN 81 MG CHEWABLE TABLET 81 MG: 81 TABLET CHEWABLE at 09:02

## 2024-07-24 RX ADMIN — GUAIFENESIN 600 MG: 600 TABLET, EXTENDED RELEASE ORAL at 09:01

## 2024-07-24 RX ADMIN — CLOPIDOGREL BISULFATE 75 MG: 75 TABLET ORAL at 09:02

## 2024-07-24 NOTE — PLAN OF CARE
Problem: Adult Inpatient Plan of Care  Goal: Plan of Care Review  Outcome: Ongoing, Progressing  Flowsheets  Taken 7/24/2024 1814 by Erin Mendoza RN  Progress: no change  Taken 7/23/2024 1626 by Jackelyn Hilton, PT Student  Plan of Care Reviewed With:   patient   family  Goal: Patient-Specific Goal (Individualized)  Outcome: Ongoing, Progressing  Goal: Absence of Hospital-Acquired Illness or Injury  Outcome: Ongoing, Progressing  Intervention: Identify and Manage Fall Risk  Recent Flowsheet Documentation  Taken 7/24/2024 1800 by Erin Mendoza RN  Safety Promotion/Fall Prevention:   activity supervised   assistive device/personal items within reach   clutter free environment maintained   room organization consistent   safety round/check completed   toileting scheduled  Taken 7/24/2024 1600 by Erin Mendoza RN  Safety Promotion/Fall Prevention:   activity supervised   assistive device/personal items within reach   clutter free environment maintained   room organization consistent   safety round/check completed   toileting scheduled  Taken 7/24/2024 1400 by Erin Mendoza RN  Safety Promotion/Fall Prevention:   activity supervised   assistive device/personal items within reach   clutter free environment maintained   room organization consistent   safety round/check completed   toileting scheduled  Taken 7/24/2024 1200 by Erin Mendoza RN  Safety Promotion/Fall Prevention:   activity supervised   assistive device/personal items within reach   clutter free environment maintained   room organization consistent   safety round/check completed   toileting scheduled  Taken 7/24/2024 1000 by Erin Mendoza RN  Safety Promotion/Fall Prevention:   activity supervised   assistive device/personal items within reach   clutter free environment maintained   room organization consistent   safety round/check completed   toileting scheduled  Taken 7/24/2024 0855 by Erin Mendoza RN  Safety Promotion/Fall Prevention:    activity supervised   assistive device/personal items within reach   clutter free environment maintained   room organization consistent   safety round/check completed   toileting scheduled  Intervention: Prevent Skin Injury  Recent Flowsheet Documentation  Taken 7/24/2024 1800 by Erin Mendoza RN  Body Position: position changed independently  Skin Protection:   adhesive use limited   incontinence pads utilized   tubing/devices free from skin contact  Taken 7/24/2024 1600 by Erin Mendoza RN  Body Position: legs elevated  Skin Protection:   adhesive use limited   incontinence pads utilized   tubing/devices free from skin contact  Taken 7/24/2024 1400 by Erin Mendoza RN  Body Position: position changed independently  Skin Protection:   adhesive use limited   incontinence pads utilized   tubing/devices free from skin contact  Taken 7/24/2024 1200 by Erin Mendoza RN  Skin Protection:   adhesive use limited   incontinence pads utilized   tubing/devices free from skin contact  Taken 7/24/2024 1000 by Erin Mendoza RN  Body Position: position changed independently  Skin Protection:   adhesive use limited   incontinence pads utilized   tubing/devices free from skin contact  Taken 7/24/2024 0855 by Erin Mendoza RN  Body Position: sitting up in bed  Skin Protection:   adhesive use limited   incontinence pads utilized   tubing/devices free from skin contact  Intervention: Prevent and Manage VTE (Venous Thromboembolism) Risk  Recent Flowsheet Documentation  Taken 7/24/2024 1800 by Erin Mendoza RN  Activity Management: activity encouraged  VTE Prevention/Management:   bilateral   sequential compression devices off   patient refused intervention  Taken 7/24/2024 1600 by Erin Mendoza RN  Activity Management: up in chair  VTE Prevention/Management:   bilateral   sequential compression devices off   patient refused intervention  Taken 7/24/2024 1400 by Erin Mendoza RN  Activity Management: activity  encouraged  VTE Prevention/Management:   bilateral   sequential compression devices off   patient refused intervention  Taken 7/24/2024 1200 by Erin Mendoza RN  VTE Prevention/Management:   bilateral   sequential compression devices off   patient refused intervention  Taken 7/24/2024 1000 by Erin Mendoza RN  Activity Management: activity encouraged  VTE Prevention/Management:   bilateral   sequential compression devices off   patient refused intervention  Taken 7/24/2024 0855 by Erin Mendoza RN  Activity Management: activity encouraged  VTE Prevention/Management:   bilateral   sequential compression devices on  Intervention: Prevent Infection  Recent Flowsheet Documentation  Taken 7/24/2024 1800 by Erin Mendoza RN  Infection Prevention:   environmental surveillance performed   rest/sleep promoted  Taken 7/24/2024 1600 by Erin Mendoza RN  Infection Prevention:   environmental surveillance performed   rest/sleep promoted  Taken 7/24/2024 1400 by Erin Mendoza RN  Infection Prevention:   environmental surveillance performed   rest/sleep promoted  Taken 7/24/2024 1200 by Erin Mendoza RN  Infection Prevention:   environmental surveillance performed   rest/sleep promoted  Taken 7/24/2024 1000 by Erin Mendoza RN  Infection Prevention:   environmental surveillance performed   rest/sleep promoted  Taken 7/24/2024 0855 by Erin Mendoza RN  Infection Prevention:   environmental surveillance performed   rest/sleep promoted  Goal: Optimal Comfort and Wellbeing  Outcome: Ongoing, Progressing  Intervention: Provide Person-Centered Care  Recent Flowsheet Documentation  Taken 7/24/2024 0855 by Erin Mendoza RN  Trust Relationship/Rapport: care explained  Goal: Readiness for Transition of Care  Outcome: Ongoing, Progressing     Problem: Skin Injury Risk Increased  Goal: Skin Health and Integrity  Outcome: Ongoing, Progressing  Intervention: Optimize Skin Protection  Recent Flowsheet Documentation  Taken  7/24/2024 1800 by Erin Mendoza RN  Pressure Reduction Techniques:   frequent weight shift encouraged   weight shift assistance provided  Head of Bed (HOB) Positioning: [facility] at 30-45 degrees  Pressure Reduction Devices:   pressure-redistributing mattress utilized   positioning supports utilized  Skin Protection:   adhesive use limited   incontinence pads utilized   tubing/devices free from skin contact  Taken 7/24/2024 1600 by Erin Mendoza RN  Pressure Reduction Techniques:   frequent weight shift encouraged   weight shift assistance provided  Pressure Reduction Devices: chair cushion utilized  Skin Protection:   adhesive use limited   incontinence pads utilized   tubing/devices free from skin contact  Taken 7/24/2024 1400 by Erin Mendoza RN  Pressure Reduction Techniques:   frequent weight shift encouraged   weight shift assistance provided  Head of Bed (HOB) Positioning: [facility] at 30-45 degrees  Pressure Reduction Devices:   pressure-redistributing mattress utilized   positioning supports utilized  Skin Protection:   adhesive use limited   incontinence pads utilized   tubing/devices free from skin contact  Taken 7/24/2024 1200 by Erin Mendoza RN  Pressure Reduction Techniques:   frequent weight shift encouraged   weight shift assistance provided  Pressure Reduction Devices:   pressure-redistributing mattress utilized   positioning supports utilized  Skin Protection:   adhesive use limited   incontinence pads utilized   tubing/devices free from skin contact  Taken 7/24/2024 1000 by Erin Mendoza RN  Pressure Reduction Techniques:   frequent weight shift encouraged   weight shift assistance provided  Head of Bed (HOB) Positioning: [facility] at 30-45 degrees  Pressure Reduction Devices: pressure-redistributing mattress utilized  Skin Protection:   adhesive use limited   incontinence pads utilized   tubing/devices free from skin contact  Taken 7/24/2024 0855 by Erin Mendoza RN  Pressure Reduction Techniques:    frequent weight shift encouraged   weight shift assistance provided  Head of Bed (HOB) Positioning: HOB at 30-45 degrees  Pressure Reduction Devices:   pressure-redistributing mattress utilized   positioning supports utilized  Skin Protection:   adhesive use limited   incontinence pads utilized   tubing/devices free from skin contact     Problem: Fall Injury Risk  Goal: Absence of Fall and Fall-Related Injury  Outcome: Ongoing, Progressing  Intervention: Identify and Manage Contributors  Recent Flowsheet Documentation  Taken 7/24/2024 1800 by Erin Mendoza RN  Medication Review/Management: medications reviewed  Taken 7/24/2024 1600 by Erin Mendoza RN  Medication Review/Management: medications reviewed  Taken 7/24/2024 1400 by Erin Mendoza RN  Medication Review/Management: medications reviewed  Taken 7/24/2024 1200 by Erin Mendoza RN  Medication Review/Management: medications reviewed  Taken 7/24/2024 1000 by Erin Mendoza RN  Medication Review/Management: medications reviewed  Taken 7/24/2024 0855 by Erin Mendoza RN  Medication Review/Management: medications reviewed  Intervention: Promote Injury-Free Environment  Recent Flowsheet Documentation  Taken 7/24/2024 1800 by Erin Mendoza RN  Safety Promotion/Fall Prevention:   activity supervised   assistive device/personal items within reach   clutter free environment maintained   room organization consistent   safety round/check completed   toileting scheduled  Taken 7/24/2024 1600 by Erin Mendoza RN  Safety Promotion/Fall Prevention:   activity supervised   assistive device/personal items within reach   clutter free environment maintained   room organization consistent   safety round/check completed   toileting scheduled  Taken 7/24/2024 1400 by Erin Mendoza RN  Safety Promotion/Fall Prevention:   activity supervised   assistive device/personal items within reach   clutter free environment maintained   room organization consistent   safety  round/check completed   toileting scheduled  Taken 7/24/2024 1200 by Erin Mendoza RN  Safety Promotion/Fall Prevention:   activity supervised   assistive device/personal items within reach   clutter free environment maintained   room organization consistent   safety round/check completed   toileting scheduled  Taken 7/24/2024 1000 by Erin Mendoza RN  Safety Promotion/Fall Prevention:   activity supervised   assistive device/personal items within reach   clutter free environment maintained   room organization consistent   safety round/check completed   toileting scheduled  Taken 7/24/2024 0855 by Erin Mendoza RN  Safety Promotion/Fall Prevention:   activity supervised   assistive device/personal items within reach   clutter free environment maintained   room organization consistent   safety round/check completed   toileting scheduled     Problem: Heart Failure Comorbidity  Goal: Maintenance of Heart Failure Symptom Control  Outcome: Ongoing, Progressing  Intervention: Maintain Heart Failure-Management  Recent Flowsheet Documentation  Taken 7/24/2024 1800 by Erin Mendoza RN  Medication Review/Management: medications reviewed  Taken 7/24/2024 1600 by Erin Mendoza RN  Medication Review/Management: medications reviewed  Taken 7/24/2024 1400 by Erin Mendoza RN  Medication Review/Management: medications reviewed  Taken 7/24/2024 1200 by Erin Mendoza RN  Medication Review/Management: medications reviewed  Taken 7/24/2024 1000 by Erin Mendoza RN  Medication Review/Management: medications reviewed  Taken 7/24/2024 0855 by Erin Mendoza RN  Medication Review/Management: medications reviewed     Problem: Hypertension Comorbidity  Goal: Blood Pressure in Desired Range  Outcome: Ongoing, Progressing  Intervention: Maintain Blood Pressure Management  Recent Flowsheet Documentation  Taken 7/24/2024 1800 by Erin Mendoza RN  Medication Review/Management: medications reviewed  Taken 7/24/2024 1600 by  Erin Mendoza RN  Medication Review/Management: medications reviewed  Taken 7/24/2024 1400 by Erin Mendoza RN  Medication Review/Management: medications reviewed  Taken 7/24/2024 1200 by Erin Mendoza RN  Medication Review/Management: medications reviewed  Taken 7/24/2024 1000 by Erin Mendoza RN  Medication Review/Management: medications reviewed  Taken 7/24/2024 0855 by Erin Mendoza RN  Medication Review/Management: medications reviewed   Goal Outcome Evaluation:           Progress: no change          Patient alert and disoriented to time consistently and intermittently disoriented to place and situation also. RA. VSS. Minimal complaints of pain controlled with PRN tylenol. NIH 2. Plan to d/c home tomorrow

## 2024-07-24 NOTE — PROGRESS NOTES
Stroke Progress Note       Chief Complaint:  Left thalamic stroke    Subjective    Subjective     Subjective: No adverse events overnight.  Right eyepatch is in place.  Patient still reports diplopia that resolves when 1 eye is covered.  She feels that her speech is baseline.  Denies any weakness or numbness.  Daughter is at bedside making plans on how to provide as much in-home care as possible with family, and home health as the patient does live alone.      Review of Systems   Constitutional:  Negative for fever.   HENT:  Negative for trouble swallowing.    Eyes:  Positive for visual disturbance. Negative for photophobia.   Respiratory:  Negative for cough and shortness of breath.    Cardiovascular:  Negative for chest pain and palpitations.   Gastrointestinal:  Negative for nausea and vomiting.   Musculoskeletal: Negative.  Negative for gait problem.   Neurological:  Positive for dizziness. Negative for facial asymmetry, speech difficulty, weakness, numbness and headaches.   Psychiatric/Behavioral: Negative.              Objective    Objective      Temp:  [97 °F (36.1 °C)-97.9 °F (36.6 °C)] 97.6 °F (36.4 °C)  Heart Rate:  [66-77] 76  Resp:  [16-18] 18  BP: (119-149)/(56-73) 149/71        Neurological Exam  Mental Status  Alert. Oriented only to person, time and situation. States she is at a rehab center. Oriented to person, place, and time. Speech is normal. Language is fluent with no aphasia. Attention and concentration are normal.    Cranial Nerves  CN II: Right superior quadrantanopsia.  CN III, IV, VI: Extraocular movements intact bilaterally. Normal lids and orbits bilaterally. Pupils equal round and reactive to light bilaterally.  CN V: Facial sensation is normal.  CN VII: Full and symmetric facial movement.  CN XII: Tongue midline without atrophy or fasciculations.    Motor  Normal muscle bulk throughout. No fasciculations present. Normal muscle tone. No abnormal involuntary movements. Strength is 5/5  throughout all four extremities.  Globally weak, 4/5 throughout.    Sensory  Light touch is normal in upper and lower extremities.     Coordination    Finger-to-nose, rapid alternating movements and heel-to-shin normal bilaterally without dysmetria.  No dysmetria out of proportion to weakness.    Gait    Not tested.      Physical Exam  Vitals reviewed.   Constitutional:       Appearance: Normal appearance.   HENT:      Head: Normocephalic and atraumatic.   Eyes:      General: Lids are normal. Visual field deficit present.      Extraocular Movements: Extraocular movements intact.      Pupils: Pupils are equal, round, and reactive to light.   Cardiovascular:      Rate and Rhythm: Normal rate.   Pulmonary:      Effort: Pulmonary effort is normal. No respiratory distress.   Musculoskeletal:         General: No swelling. Normal range of motion.      Cervical back: Normal range of motion.   Skin:     General: Skin is warm and dry.   Neurological:      Mental Status: She is alert and oriented to person, place, and time.      Cranial Nerves: Cranial nerve deficit present.      Sensory: No sensory deficit.      Motor: Motor strength is normal.No weakness.      Coordination: Coordination is intact.   Psychiatric:         Mood and Affect: Mood normal.         Speech: Speech normal.         Behavior: Behavior normal.         Results Review:    I reviewed the patient's new clinical results.  WBC   Date Value Ref Range Status   07/23/2024 14.23 (H) 3.40 - 10.80 10*3/mm3 Final     RBC   Date Value Ref Range Status   07/23/2024 3.71 (L) 3.77 - 5.28 10*6/mm3 Final     Hemoglobin   Date Value Ref Range Status   07/23/2024 12.0 12.0 - 15.9 g/dL Final     Hematocrit   Date Value Ref Range Status   07/23/2024 36.5 34.0 - 46.6 % Final     MCV   Date Value Ref Range Status   07/23/2024 98.4 (H) 79.0 - 97.0 fL Final     MCH   Date Value Ref Range Status   07/23/2024 32.3 26.6 - 33.0 pg Final     MCHC   Date Value Ref Range Status    07/23/2024 32.9 31.5 - 35.7 g/dL Final     RDW   Date Value Ref Range Status   07/23/2024 13.6 12.3 - 15.4 % Final     RDW-SD   Date Value Ref Range Status   07/23/2024 49.6 37.0 - 54.0 fl Final     MPV   Date Value Ref Range Status   07/23/2024 10.4 6.0 - 12.0 fL Final     Platelets   Date Value Ref Range Status   07/23/2024 206 140 - 450 10*3/mm3 Final     Neutrophil %   Date Value Ref Range Status   07/23/2024 74.1 42.7 - 76.0 % Final     Lymphocyte %   Date Value Ref Range Status   07/23/2024 14.3 (L) 19.6 - 45.3 % Final     Monocyte %   Date Value Ref Range Status   07/23/2024 9.7 5.0 - 12.0 % Final     Eosinophil %   Date Value Ref Range Status   07/23/2024 0.9 0.3 - 6.2 % Final     Basophil %   Date Value Ref Range Status   07/23/2024 0.6 0.0 - 1.5 % Final     Immature Grans %   Date Value Ref Range Status   07/23/2024 0.4 0.0 - 0.5 % Final     Neutrophils, Absolute   Date Value Ref Range Status   07/23/2024 10.55 (H) 1.70 - 7.00 10*3/mm3 Final     Lymphocytes, Absolute   Date Value Ref Range Status   07/23/2024 2.04 0.70 - 3.10 10*3/mm3 Final     Monocytes, Absolute   Date Value Ref Range Status   07/23/2024 1.38 (H) 0.10 - 0.90 10*3/mm3 Final     Eosinophils, Absolute   Date Value Ref Range Status   07/23/2024 0.13 0.00 - 0.40 10*3/mm3 Final     Basophils, Absolute   Date Value Ref Range Status   07/23/2024 0.08 0.00 - 0.20 10*3/mm3 Final     Immature Grans, Absolute   Date Value Ref Range Status   07/23/2024 0.05 0.00 - 0.05 10*3/mm3 Final     nRBC   Date Value Ref Range Status   07/23/2024 0.0 0.0 - 0.2 /100 WBC Final     Lab Results   Component Value Date    GLUCOSE 104 (H) 07/23/2024    BUN 24 (H) 07/23/2024    CREATININE 1.55 (H) 07/23/2024    EGFR 31.9 (L) 07/23/2024    BCR 15.5 07/23/2024    K 4.5 07/23/2024    CO2 22.0 07/23/2024    CALCIUM 8.7 07/23/2024    ALBUMIN 3.5 07/23/2024    BILITOT 0.8 07/23/2024    AST 22 07/23/2024    ALT 9 07/23/2024     A1c 5.0  LDL 47    EEG    Result Date:  7/24/2024  Diffuse cerebral dysfunction of mild degree but nonspecific.  This is most commonly seen due to toxic/metabolic cause This report is transcribed using the Dragon dictation system.      MRI Brain Without Contrast    Result Date: 7/22/2024  Impression: 1. Acute left thalamic infarct, and punctate white matter infarct in the high anterior left frontal lobe 2. Chronic small vessel ischemic white matter changes Electronically Signed: Alvarez León  7/22/2024 9:33 PM EDT  Workstation ID: [id number]    CT Angiogram Head w AI Analysis of LVO    Result Date: 7/22/2024  1.No acute abnormality identified within the large arteries of the head or neck. 2.Focal plaque within the right proximal ICA with approximately 40 to 50% focal stenosis 1.5 cm beyond its origin. 3.No significant stenosis of the left internal carotid artery. 4.There is suggestion of a 2 mm saccular aneurysm seen about the junction of the right supraclinoid ICA/P-comm (series 5, images 237-238). 5.CT perfusion study demonstrates no territorial ischemia or core infarct. Electronically Signed: Swapnil Mcfarland MD  7/22/2024 10:50 AM EDT  Workstation ID: [id number]    CT Angiogram Neck    Result Date: 7/22/2024  1.No acute abnormality identified within the large arteries of the head or neck. 2.Focal plaque within the right proximal ICA with approximately 40 to 50% focal stenosis 1.5 cm beyond its origin. 3.No significant stenosis of the left internal carotid artery. 4.There is suggestion of a 2 mm saccular aneurysm seen about the junction of the right supraclinoid ICA/P-comm (series 5, images 237-238). 5.CT perfusion study demonstrates no territorial ischemia or core infarct. Electronically Signed: Swapnil Mcfarland MD  7/22/2024 10:50 AM EDT  Workstation ID: [id number]    CT CEREBRAL PERFUSION WITH & WITHOUT CONTRAST    Result Date: 7/22/2024  1.No acute abnormality identified within the large arteries of the head or neck. 2.Focal plaque within the right  proximal ICA with approximately 40 to 50% focal stenosis 1.5 cm beyond its origin. 3.No significant stenosis of the left internal carotid artery. 4.There is suggestion of a 2 mm saccular aneurysm seen about the junction of the right supraclinoid ICA/P-comm (series 5, images 237-238). 5.CT perfusion study demonstrates no territorial ischemia or core infarct. Electronically Signed: Swapnil Mcfarland MD  7/22/2024 10:50 AM EDT  Workstation ID: [id number]    CT Head Without Contrast Stroke Protocol    Result Date: 7/22/2024  Impression: Chronic appearing changes of the aging brain. No evidence of acute intracranial disease. Electronically Signed: Arpit Trevino MD  7/22/2024 10:38 AM EDT  Workstation ID: [id number]     Results for orders placed during the hospital encounter of 07/22/24    Adult Transthoracic Echo Complete W/ Cont if Necessary Per Protocol (With Agitated Saline)    Interpretation Summary    Left ventricular systolic function is low normal. Left ventricular ejection fraction appears to be 51 - 55%.    Left ventricular wall thickness is consistent with borderline concentric hypertrophy.    Left ventricular diastolic function is consistent with (grade Ia w/high LAP) impaired relaxation.    Left atrial volume is severely increased.    Mild aortic valve stenosis is present. Aortic valve area is 1.1 cm2.    Peak velocity of the flow distal to the aortic valve is 270 cm/s. Aortic valve maximum pressure gradient is 29 mmHg. Aortic valve mean pressure gradient is 16 mmHg.    Estimated right ventricular systolic pressure from tricuspid regurgitation is normal (<35 mmHg). Calculated right ventricular systolic pressure from tricuspid regurgitation is 26 mmHg.    There is a left pleural effusion. There is a right pleural effusion.    Mild to moderate mitral valve regurgitation    EF slightly improved compared to previous study            Assessment/Plan     Assessment/Plan:  Daija Elizabeth is an 89-year-old female with a  PMH HTN, hypothyroidism, CKD, nonischemic cardiomyopathy who presented to [facility] ED on 7/22/2024 with acute onset vision deficits.  CT head 7/22 unremarkable.  CT angiogram head and neck 7/22/2024 reveals 40-50% R ICA stenoses, 2 mm right supraclinoid saccular aneurysm.  MRI brain 7/22 revealed acute left thalamic infarct and high left frontal lobe infarct.  TTE 7/22/2024 LVEF 51-55%, severely dilated left atrial volume, no PFO or thrombus mentioned.       # Acute multifocal infarcts: left thalamic and left frontoparietal  Suspect cardioembolic etiologies in setting of severely dilated left atrial volume and stroke in multiple vascular territories.  # Extracranial atherosclerotic disease: will treat with maximal medical management  # Right supraclinoid aneurysm  # Altered mental status     -Continue aspirin 81 mg daily  -Continue clopidogrel 75 mg daily  -Continue atorvastatin 80 mg daily  -Monitor telemetry for atrial fibrillation  -Holter monitor ordered  -If atrial fibrillation is found, would recommend switch DAPT to apixaban 5 mg BID  -EEG negative  -PT OT recommend outpatient vision therapies, will place referral to Dr. Ramsay   -Neuro-checks per unit protocol while inpatient  -Blood pressure goal: permissive  -DVT prophylaxis: SCD, heparin  -Follow-up in stroke clinic in 1 month     Discussed the importance of medication compliance Plavix 75mg daily, Aspirin 81mg daily, and Atorvastatin 80mg nightly and lifestyle modifications adequate control of blood pressure, adequate control of cholesterol (goal LDL <70), increased physical activity, and implementation of healthy diet to help reduce the risk of future cerebrovascular events.  Also discussed the signs symptoms that would warrant the patient return back to the emergency department including unilateral weakness, unilateral numbness, visual disturbances, loss of balance, speech difficulties, and/or a sudden severe headache.  Patient and daughter verbalized  understanding.    No further stroke workup needed from our standpoint, will follow peripherally.  Plan of care discussed with patient, family and Dr. Nguyen.      LOREN Colon  07/24/24  09:04 EDT

## 2024-07-24 NOTE — PLAN OF CARE
Problem: Adult Inpatient Plan of Care  Goal: Absence of Hospital-Acquired Illness or Injury  Intervention: Identify and Manage Fall Risk  Recent Flowsheet Documentation  Taken 7/24/2024 0053 by Peggy Hilton RN  Safety Promotion/Fall Prevention:   activity supervised   assistive device/personal items within reach   clutter free environment maintained   lighting adjusted   nonskid shoes/slippers when out of bed   room organization consistent   safety round/check completed  Taken 7/23/2024 2202 by Peggy Hilton RN  Safety Promotion/Fall Prevention:   activity supervised   assistive device/personal items within reach   clutter free environment maintained   lighting adjusted   nonskid shoes/slippers when out of bed   room organization consistent   safety round/check completed  Taken 7/23/2024 1958 by Peggy Hilton RN  Safety Promotion/Fall Prevention:   activity supervised   assistive device/personal items within reach   clutter free environment maintained   lighting adjusted   nonskid shoes/slippers when out of bed   room organization consistent   safety round/check completed   gait belt   mobility aid in reach  Intervention: Prevent Skin Injury  Recent Flowsheet Documentation  Taken 7/24/2024 0053 by Peggy Hilton RN  Body Position: weight shifting  Skin Protection:   adhesive use limited   incontinence pads utilized   tubing/devices free from skin contact   skin-to-skin areas padded   skin-to-device areas padded  Taken 7/23/2024 2202 by Peggy Hilton RN  Body Position: supine  Skin Protection:   adhesive use limited   incontinence pads utilized   tubing/devices free from skin contact   skin-to-skin areas padded   skin-to-device areas padded  Taken 7/23/2024 1958 by Peggy Hilton RN  Body Position: legs elevated  Skin Protection:   adhesive use limited   incontinence pads utilized   tubing/devices free from skin contact   skin-to-skin areas padded   skin-to-device areas padded  Intervention: Prevent and Manage VTE  (Venous Thromboembolism) Risk  Recent Flowsheet Documentation  Taken 7/24/2024 0053 by Peggy Hilton, RN  VTE Prevention/Management:   bilateral   sequential compression devices on  Taken 7/23/2024 2202 by Peggy Hilton RN  Activity Management:   back to bed   up to bedside commode  VTE Prevention/Management:   bilateral   sequential compression devices on  Taken 7/23/2024 1958 by Peggy Hilton, RN  Activity Management: up in chair  VTE Prevention/Management:   bilateral   sequential compression devices on  Range of Motion: active ROM (range of motion) encouraged  Intervention: Prevent Infection  Recent Flowsheet Documentation  Taken 7/24/2024 0053 by Peggy Hilton RN  Infection Prevention:   environmental surveillance performed   hand hygiene promoted   rest/sleep promoted   single patient room provided  Taken 7/23/2024 2202 by Peggy Hilton RN  Infection Prevention:   environmental surveillance performed   hand hygiene promoted   rest/sleep promoted   single patient room provided  Taken 7/23/2024 1958 by Peggy Hilton RN  Infection Prevention:   environmental surveillance performed   hand hygiene promoted   rest/sleep promoted   single patient room provided  Goal: Optimal Comfort and Wellbeing  Intervention: Provide Person-Centered Care  Recent Flowsheet Documentation  Taken 7/23/2024 1958 by Peggy Hilton, RN  Trust Relationship/Rapport:   care explained   choices provided   emotional support provided   empathic listening provided   questions answered   questions encouraged   reassurance provided   thoughts/feelings acknowledged   Goal Outcome Evaluation:

## 2024-07-24 NOTE — DISCHARGE INSTRUCTIONS
Please continue taking aspirin 81 mg and Plavix 75 mg daily for prevention of future stroke/TIA    Please continue taking atorvastatin to treat cholesterol    Please keep all appointments.  You will be scheduled with the stroke clinic in 1 month and will have a follow-up appointment with Dr. Donovan palencia who is a neuro optometrist.    If you develop any new or worsening symptoms including: Lateral weakness/numbness, speech difficulties, visual changes, dizziness or problems with balance, or severe throbbing headache please call 911 immediately

## 2024-07-24 NOTE — CASE MANAGEMENT/SOCIAL WORK
Continued Stay Note  [facility]     Patient Name: Sylvia Jalloh  MRN: [id number]  Today's Date: 7/24/2024    Admit Date: 7/22/2024    Plan: Home with possible HH & [facility] resources   Discharge Plan       Row Name 07/24/24 0928       Plan    Plan Home with possible HH & [facility] resources    Patient/Family in Agreement with Plan yes    Plan Comments CM spoke with patient, son & dtrYohana at bedside regarding DC planning. Discussed therapy recommendations with them: OPPT/HH & 24/7 care. They want to think about home health. Daughter asked about sitter service resources as daughter and granddaughter both work during the day and will need additional assistance at home. CM gave info for [facility] and sent referral to Justine Stokes. CM following.    Final Discharge Disposition Code 01 - home or self-care                   Discharge Codes    No documentation.                 Expected Discharge Date and Time       Expected Discharge Date Expected Discharge Time    Jul 26, 2024               Rosemarie Echols RN

## 2024-07-24 NOTE — PROGRESS NOTES
[facility] Medicine Services  PROGRESS NOTE    Patient Name: Sylvia Jalloh  : 1934  MRN: [id number]    Date of Admission: 2024  Primary Care Physician: Bart Pop APRN    Subjective   Subjective     CC:  F/u stroke    HPI:  Patient resting in bed. Family at bedside feel like she is doing some better, request to stay additional day so that they can arrange things for her at home.      Objective   Objective     Vital Signs:   Temp:  [97 °F (36.1 °C)-97.9 °F (36.6 °C)] 97.6 °F (36.4 °C)  Heart Rate:  [66-77] 72  Resp:  [16-18] 18  BP: (119-149)/(56-73) 149/68     Physical Exam:  Constitutional: No acute distress, awake, alert  HENT: NCAT, mucous membranes moist  Respiratory: Clear to auscultation bilaterally, respiratory effort normal   Cardiovascular: RRR, no murmurs, rubs, or gallops  Gastrointestinal: soft, nontender, nondistended  Musculoskeletal: No bilateral ankle edema  Psychiatric: Appropriate affect, cooperative  Neurologic: Oriented x 3, speech clear, left eye patch  Skin: No rashes      Results Reviewed:  LAB RESULTS:      Lab 24  0333 24  1017 24  1009 24  1008   WBC 14.23* 14.37*  --   --    HEMOGLOBIN 12.0 11.8*  --   --    HEMOGLOBIN, POC  --   --  11.6*  --    HEMATOCRIT 36.5 35.1  --   --    HEMATOCRIT POC  --   --  34*  --    PLATELETS 206 203  --   --    NEUTROS ABS 10.55* 10.76*  --   --    IMMATURE GRANS (ABS) 0.05 0.05  --   --    LYMPHS ABS 2.04 2.40  --   --    MONOS ABS 1.38* 0.99*  --   --    EOS ABS 0.13 0.09  --   --    MCV 98.4* 96.7  --   --    PROTIME  --   --   --  14.5   APTT  --  44.4*  --   --          Lab 24  0333 24  1018 24  1009   SODIUM 136 136  --    POTASSIUM 4.5 4.2  --    CHLORIDE 102 101  --    CO2 22.0 21.0*  --    ANION GAP 12.0 14.0  --    BUN 24* 22  --    CREATININE 1.55* 1.51* 1.60*   EGFR 31.9* 32.9* 30.7*   GLUCOSE 104* 109*  --    CALCIUM 8.7 8.9  --    HEMOGLOBIN  A1C 5.00  --   --          Lab 07/23/24  0333 07/22/24  1018   TOTAL PROTEIN 6.7 7.2   ALBUMIN 3.5 3.8   GLOBULIN 3.2 3.4   ALT (SGPT) 9 9  9   AST (SGOT) 22 21  23   BILIRUBIN 0.8 0.8   ALK PHOS 72 77         Lab 07/22/24  1306 07/22/24  1018 07/22/24  1008   HSTROP T 59* 54*  --    PROTIME  --   --  14.5   INR  --   --  1.2         Lab 07/23/24  0333   CHOLESTEROL 118   LDL CHOL 47   HDL CHOL 53   TRIGLYCERIDES 98             Brief Urine Lab Results  (Last result in the past 365 days)        Color   Clarity   Blood   Leuk Est   Nitrite   Protein   CREAT   Urine HCG        07/22/24 1146 Yellow   Clear   Trace   Moderate (2+)   Negative   Negative                   Microbiology Results Abnormal       Procedure Component Value - Date/Time    Blood Culture - Blood, Arm, Right [[id number]]  (Normal) Collected: 07/22/24 1321    Lab Status: Preliminary result Specimen: Blood from Arm, Right Updated: 07/23/24 1345     Blood Culture No growth at 24 hours    Narrative:      Less than seven (7) mL's of blood was collected.  Insufficient quantity may yield false negative results.    Blood Culture - Blood, Arm, Left [[id number]]  (Normal) Collected: 07/22/24 1321    Lab Status: Preliminary result Specimen: Blood from Arm, Left Updated: 07/23/24 1345     Blood Culture No growth at 24 hours    Narrative:      Less than seven (7) mL's of blood was collected.  Insufficient quantity may yield false negative results.            EEG    Result Date: 7/24/2024  Reason for referral: 89 y.o.female with altered mental status Technical Summary:  A 19 channel digital EEG was performed using the international 10-20 placement system, including eye leads and EKG leads. Duration: 22 minutes Findings: The patient is resting quietly in bed.  The background shows diffuse low amplitude 5-8 Hz theta with intermixed faster alpha frequencies seen at times.  A posterior rhythm is not clearly seen.  Drowsiness and light sleep are seen with mild slowing  of the background and vertex waves.  No focal features or epileptiform activity are seen.  Photic stimulation and hyperventilation are not performed. Video: Available Technical quality: Superior EKG: Regular, 70 bpm SUMMARY: Mild generalized slow No focal features or epileptiform activity are seen     Impression: Diffuse cerebral dysfunction of mild degree but nonspecific.  This is most commonly seen due to toxic/metabolic cause This report is transcribed using the Dragon dictation system.      MRI Brain Without Contrast    Result Date: 7/22/2024  MRI BRAIN WO CONTRAST Date of Exam: 7/22/2024 8:35 PM EDT Indication: Stroke, follow up Acute stroke suspected.  Comparison: None available. Technique:  Routine multiplanar/multisequence sequence images of the brain were obtained without contrast administration. Findings: There is restricted diffusion in the anteromedial left thalamus. There is a punctate focus of restricted diffusion in the high anterior left frontal white matter. There is no suspicious susceptibility. The thalamic diffusion abnormality is associated with increased T2 signal. There are multifocal T2 signal hyperintensities in the supratentorial white matter not associated with restricted diffusion, compatible with chronic microvascular ischemic change. No cortical signal abnormality is demonstrated. Major intracranial flow voids are present. Cerebellar tonsils are in normal position. There is no abnormal extra-axial fluid collection.     Impression: Impression: 1. Acute left thalamic infarct, and punctate white matter infarct in the high anterior left frontal lobe 2. Chronic small vessel ischemic white matter changes Electronically Signed: Alvarez León  7/22/2024 9:33 PM EDT  Workstation ID: [id number]    Adult Transthoracic Echo Complete W/ Cont if Necessary Per Protocol (With Agitated Saline)    Result Date: 7/22/2024    Left ventricular systolic function is low normal. Left ventricular ejection fraction  appears to be 51 - 55%.   Left ventricular wall thickness is consistent with borderline concentric hypertrophy.   Left ventricular diastolic function is consistent with (grade Ia w/high LAP) impaired relaxation.   Left atrial volume is severely increased.   Mild aortic valve stenosis is present. Aortic valve area is 1.1 cm2.   Peak velocity of the flow distal to the aortic valve is 270 cm/s. Aortic valve maximum pressure gradient is 29 mmHg. Aortic valve mean pressure gradient is 16 mmHg.   Estimated right ventricular systolic pressure from tricuspid regurgitation is normal (<35 mmHg). Calculated right ventricular systolic pressure from tricuspid regurgitation is 26 mmHg.   There is a left pleural effusion. There is a right pleural effusion.   Mild to moderate mitral valve regurgitation EF slightly improved compared to previous study     CT Abdomen Pelvis Without Contrast    Result Date: 7/22/2024  CT ABDOMEN PELVIS WO CONTRAST Date of Exam: 7/22/2024 1:58 PM EDT Indication: back pain/UTI. Comparison: None available. Technique: Axial CT images were obtained of the abdomen and pelvis without the administration of contrast. Reconstructed coronal and sagittal images were also obtained. Automated exposure control and iterative construction methods were used. Findings: LOWER THORAX: There is bilateral lower lobe bronchial wall thickening with areas of irregular consolidation and mucous plugging. Correlate for signs of pulmonary infection either acute or chronic. There is a thin anterior pericardial effusion measuring up to 7 mm in thickness. There is heavy mitral annular calcification. LIVER:  Unremarkable parenchyma without focal lesion. BILIARY/GALLBLADDER: Cholecystectomy. SPLEEN:  Unremarkable PANCREAS:  Unremarkable ADRENAL: There is generalized left adrenal thickening. KIDNEYS:  Unremarkable parenchyma with no solid mass identified. There is contrast within the collecting system and ureters related to recent  contrasted CT studies. No obstruction.  No calculus identified. GASTROINTESTINAL/MESENTERY: There is a moderate-sized sliding hiatal hernia no evidence of obstruction nor inflammation.  AORTA/IVC:  Normal caliber. RETROPERITONEUM/LYMPH NODES:  Unremarkable REPRODUCTIVE: Hysterectomy. There is contrast within the vagina presumably refluxed during micturition although vesicovaginal fistula not excluded. Correlate with symptoms. BLADDER:  Unremarkable OSSEUS STRUCTURES: Spinal degenerative changes without acute osseous process identified.     Impression: Impression: 1.Bilateral lower lung airspace disease with areas of mucous endobronchial plugging. Correlate for signs of acute or chronic infection/pneumonia. 2.There is contrast within the vagina either refluxed during micturition or result of vesicovaginal fistula. Correlate with symptoms. 3.Other incidental nonemergent findings as detailed above. Electronically Signed: Julian Bishop MD  7/22/2024 2:20 PM EDT  Workstation ID: [id number]     Results for orders placed during the hospital encounter of 07/22/24    Adult Transthoracic Echo Complete W/ Cont if Necessary Per Protocol (With Agitated Saline)    Interpretation Summary    Left ventricular systolic function is low normal. Left ventricular ejection fraction appears to be 51 - 55%.    Left ventricular wall thickness is consistent with borderline concentric hypertrophy.    Left ventricular diastolic function is consistent with (grade Ia w/high LAP) impaired relaxation.    Left atrial volume is severely increased.    Mild aortic valve stenosis is present. Aortic valve area is 1.1 cm2.    Peak velocity of the flow distal to the aortic valve is 270 cm/s. Aortic valve maximum pressure gradient is 29 mmHg. Aortic valve mean pressure gradient is 16 mmHg.    Estimated right ventricular systolic pressure from tricuspid regurgitation is normal (<35 mmHg). Calculated right ventricular systolic pressure from tricuspid  regurgitation is 26 mmHg.    There is a left pleural effusion. There is a right pleural effusion.    Mild to moderate mitral valve regurgitation    EF slightly improved compared to previous study      Current medications:  Scheduled Meds:amLODIPine, 2.5 mg, Oral, Q24H  aspirin, 81 mg, Oral, Daily   Or  aspirin, 300 mg, Rectal, Daily  atorvastatin, 80 mg, Oral, Nightly  carvedilol, 12.5 mg, Oral, BID With Meals  cefTRIAXone, 1,000 mg, Intravenous, Q24H  cetirizine, 10 mg, Oral, Daily  clopidogrel, 75 mg, Oral, Daily  doxycycline, 100 mg, Oral, Q12H  estradiol, 0.5 mg, Oral, Daily  furosemide, 20 mg, Oral, Daily  guaiFENesin, 600 mg, Oral, Q12H  heparin (porcine), 5,000 Units, Subcutaneous, Q8H  levothyroxine, 75 mcg, Oral, Daily  multivitamin with minerals, 1 tablet, Oral, Daily  sacubitril-valsartan, 1 tablet, Oral, Q12H  sodium chloride, 10 mL, Intravenous, Q12H  sodium chloride, 10 mL, Intravenous, Q12H  spironolactone, 25 mg, Oral, Daily      Continuous Infusions:   PRN Meds:.  acetaminophen    senna-docusate sodium **AND** polyethylene glycol **AND** bisacodyl **AND** bisacodyl    hydrOXYzine    meclizine    nitroglycerin    ondansetron ODT **OR** ondansetron    sodium chloride    sodium chloride    sodium chloride    sodium chloride    sodium chloride    Assessment & Plan   Assessment & Plan     Active Hospital Problems    Diagnosis  POA    **TIA (transient ischemic attack) [G45.9]  Yes    Non-ischemic cardiomyopathy [I42.8]  Yes    CKD (chronic kidney disease) stage 3, GFR 30-59 ml/min [N18.30]  Yes    Hypothyroidism [E03.9]  Yes      Resolved Hospital Problems   No resolved problems to display.        Brief Hospital Course to date:  Sylvia Jalloh is a 89 y.o. female with history of NICM/HFrEF, CKD III, HTN, hypothyroidism here with acute dizziness/double vision..     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  Double Vision  -initial CT imaging with R ICA plaque/stenosis, 2 mm saccular aneurysm  -CT  perfusion without core infarction  -MRI with acute left thalamic infarct, and punctate white matter infarct in the high anterior left frontal lobe   -on DAPT/statin  -Echo with EF 51-55%  -Stroke Neurology recommends monitoring for A.fib, if no evidence here would plan holter at discharge. If A.fib found would recommend switching DAPT to Eliquis  -PT/OT, applied eye patch to help w/double vision     E.coli UTI  Leukocytosis  -continue CTX, f/u sensitivities     Possible PNA LLL  -rocephin/doxy  -pulmonary toilet     History of NICM  History of HFrEF  - Echo w/EF 51-55%, also noted mild aortic valve stenosis  -continue BB/entresto/diuretics  -previously prescribed Farxiga for either HFrEF, or CKD per family but instructed to stop, but family notes she may have continued taking it     CKD  -stable renal function    Expected Discharge Location and Transportation: Home tomorrow w/family  Expected Discharge   Expected Discharge Date: 7/26/2024; Expected Discharge Time:      VTE Prophylaxis:  Pharmacologic & mechanical VTE prophylaxis orders are present.         AM-PAC 6 Clicks Score (PT): 17 (07/24/24 5262)    CODE STATUS:   Code Status and Medical Interventions:   Ordered at: 07/22/24 1302     Code Status (Patient has no pulse and is not breathing):    CPR (Attempt to Resuscitate)     Medical Interventions (Patient has pulse or is breathing):    Full Support       Hortencia vIey, DO  07/24/24

## 2024-07-25 PROCEDURE — 97530 THERAPEUTIC ACTIVITIES: CPT

## 2024-07-25 PROCEDURE — 97116 GAIT TRAINING THERAPY: CPT

## 2024-07-25 PROCEDURE — 25010000002 CEFTRIAXONE PER 250 MG: Performed by: HOSPITALIST

## 2024-07-25 PROCEDURE — 99232 SBSQ HOSP IP/OBS MODERATE 35: CPT | Performed by: INTERNAL MEDICINE

## 2024-07-25 PROCEDURE — 25010000002 HEPARIN (PORCINE) PER 1000 UNITS: Performed by: HOSPITALIST

## 2024-07-25 RX ADMIN — HEPARIN SODIUM 5000 UNITS: 5000 INJECTION INTRAVENOUS; SUBCUTANEOUS at 06:38

## 2024-07-25 RX ADMIN — Medication 10 ML: at 20:03

## 2024-07-25 RX ADMIN — SODIUM CHLORIDE 1000 MG: 900 INJECTION INTRAVENOUS at 12:30

## 2024-07-25 RX ADMIN — SPIRONOLACTONE 25 MG: 25 TABLET ORAL at 09:09

## 2024-07-25 RX ADMIN — GUAIFENESIN 600 MG: 600 TABLET, EXTENDED RELEASE ORAL at 09:00

## 2024-07-25 RX ADMIN — ACETAMINOPHEN 650 MG: 325 TABLET ORAL at 19:57

## 2024-07-25 RX ADMIN — ATORVASTATIN CALCIUM 80 MG: 40 TABLET, FILM COATED ORAL at 20:03

## 2024-07-25 RX ADMIN — AMLODIPINE BESYLATE 2.5 MG: 2.5 TABLET ORAL at 09:01

## 2024-07-25 RX ADMIN — HEPARIN SODIUM 5000 UNITS: 5000 INJECTION INTRAVENOUS; SUBCUTANEOUS at 22:20

## 2024-07-25 RX ADMIN — SACUBITRIL AND VALSARTAN 1 TABLET: 49; 51 TABLET, FILM COATED ORAL at 22:20

## 2024-07-25 RX ADMIN — LEVOTHYROXINE SODIUM 75 MCG: 0.07 TABLET ORAL at 06:38

## 2024-07-25 RX ADMIN — ASPIRIN 81 MG CHEWABLE TABLET 81 MG: 81 TABLET CHEWABLE at 09:01

## 2024-07-25 RX ADMIN — CARVEDILOL 12.5 MG: 12.5 TABLET, FILM COATED ORAL at 09:01

## 2024-07-25 RX ADMIN — GUAIFENESIN 600 MG: 600 TABLET, EXTENDED RELEASE ORAL at 20:03

## 2024-07-25 RX ADMIN — CETIRIZINE HYDROCHLORIDE 10 MG: 10 TABLET, FILM COATED ORAL at 09:00

## 2024-07-25 RX ADMIN — ESTRADIOL 0.5 MG: 0.5 TABLET ORAL at 09:05

## 2024-07-25 RX ADMIN — HEPARIN SODIUM 5000 UNITS: 5000 INJECTION INTRAVENOUS; SUBCUTANEOUS at 15:29

## 2024-07-25 RX ADMIN — CARVEDILOL 12.5 MG: 12.5 TABLET, FILM COATED ORAL at 18:22

## 2024-07-25 RX ADMIN — CLOPIDOGREL BISULFATE 75 MG: 75 TABLET ORAL at 09:01

## 2024-07-25 RX ADMIN — DOXYCYCLINE 100 MG: 100 CAPSULE ORAL at 20:03

## 2024-07-25 RX ADMIN — DOXYCYCLINE 100 MG: 100 CAPSULE ORAL at 09:01

## 2024-07-25 RX ADMIN — Medication 1 TABLET: at 09:01

## 2024-07-25 RX ADMIN — SACUBITRIL AND VALSARTAN 1 TABLET: 49; 51 TABLET, FILM COATED ORAL at 09:05

## 2024-07-25 RX ADMIN — Medication 10 ML: at 12:26

## 2024-07-25 RX ADMIN — FUROSEMIDE 20 MG: 20 TABLET ORAL at 09:01

## 2024-07-25 NOTE — PROGRESS NOTES
[facility] Medicine Services  PROGRESS NOTE    Patient Name: Sylvia Jalloh  : 1934  MRN: [id number]    Date of Admission: 2024  Primary Care Physician: Bart Pop APRN    Subjective   Subjective     CC:  F/u stroke    HPI:  Patient sitting up in bed. Multiple family members at bedside, do not feel that they are ready to bring her home yet today and ask for additional day to get things ready. Also asking about rehab again and desire for patient to go to [facility] if possible. Patient says she feels about the same, vision is unchanged but continues to wear eye patch      Objective   Objective     Vital Signs:   Temp:  [97.6 °F (36.4 °C)-98.2 °F (36.8 °C)] 97.6 °F (36.4 °C)  Heart Rate:  [67-74] (P) 74  Resp:  [18] 18  BP: (110-149)/(55-90) (P) 153/66     Physical Exam:  Constitutional: No acute distress, awake, alert  HENT: NCAT, mucous membranes moist, eye patch in place  Respiratory: Clear to auscultation bilaterally, respiratory effort normal   Cardiovascular: RRR, no murmurs, rubs, or gallops  Gastrointestinal: soft, nontender, nondistended  Musculoskeletal: No bilateral ankle edema  Psychiatric: Appropriate affect, cooperative  Neurologic: Oriented x 3, speech clear  Skin: No rashes      Results Reviewed:  LAB RESULTS:      Lab 24  0333 24  1017 24  1009 24  1008   WBC 14.23* 14.37*  --   --    HEMOGLOBIN 12.0 11.8*  --   --    HEMOGLOBIN, POC  --   --  11.6*  --    HEMATOCRIT 36.5 35.1  --   --    HEMATOCRIT POC  --   --  34*  --    PLATELETS 206 203  --   --    NEUTROS ABS 10.55* 10.76*  --   --    IMMATURE GRANS (ABS) 0.05 0.05  --   --    LYMPHS ABS 2.04 2.40  --   --    MONOS ABS 1.38* 0.99*  --   --    EOS ABS 0.13 0.09  --   --    MCV 98.4* 96.7  --   --    PROTIME  --   --   --  14.5   APTT  --  44.4*  --   --          Lab 24  0333 24  1018 24  1009   SODIUM 136 136  --    POTASSIUM 4.5 4.2  --     CHLORIDE 102 101  --    CO2 22.0 21.0*  --    ANION GAP 12.0 14.0  --    BUN 24* 22  --    CREATININE 1.55* 1.51* 1.60*   EGFR 31.9* 32.9* 30.7*   GLUCOSE 104* 109*  --    CALCIUM 8.7 8.9  --    HEMOGLOBIN A1C 5.00  --   --          Lab 07/23/24  0333 07/22/24  1018   TOTAL PROTEIN 6.7 7.2   ALBUMIN 3.5 3.8   GLOBULIN 3.2 3.4   ALT (SGPT) 9 9  9   AST (SGOT) 22 21  23   BILIRUBIN 0.8 0.8   ALK PHOS 72 77         Lab 07/22/24  1306 07/22/24  1018 07/22/24  1008   HSTROP T 59* 54*  --    PROTIME  --   --  14.5   INR  --   --  1.2         Lab 07/23/24  0333   CHOLESTEROL 118   LDL CHOL 47   HDL CHOL 53   TRIGLYCERIDES 98             Brief Urine Lab Results  (Last result in the past 365 days)        Color   Clarity   Blood   Leuk Est   Nitrite   Protein   CREAT   Urine HCG        07/22/24 1146 Yellow   Clear   Trace   Moderate (2+)   Negative   Negative                   Microbiology Results Abnormal       Procedure Component Value - Date/Time    Blood Culture - Blood, Arm, Right [[id number]]  (Normal) Collected: 07/22/24 1321    Lab Status: Preliminary result Specimen: Blood from Arm, Right Updated: 07/24/24 1345     Blood Culture No growth at 2 days    Narrative:      Less than seven (7) mL's of blood was collected.  Insufficient quantity may yield false negative results.    Blood Culture - Blood, Arm, Left [[id number]]  (Normal) Collected: 07/22/24 1321    Lab Status: Preliminary result Specimen: Blood from Arm, Left Updated: 07/24/24 1345     Blood Culture No growth at 2 days    Narrative:      Less than seven (7) mL's of blood was collected.  Insufficient quantity may yield false negative results.            EEG    Result Date: 7/24/2024  Reason for referral: 89 y.o.female with altered mental status Technical Summary:  A 19 channel digital EEG was performed using the international 10-20 placement system, including eye leads and EKG leads. Duration: 22 minutes Findings: The patient is resting quietly in bed.  The  background shows diffuse low amplitude 5-8 Hz theta with intermixed faster alpha frequencies seen at times.  A posterior rhythm is not clearly seen.  Drowsiness and light sleep are seen with mild slowing of the background and vertex waves.  No focal features or epileptiform activity are seen.  Photic stimulation and hyperventilation are not performed. Video: Available Technical quality: Superior EKG: Regular, 70 bpm SUMMARY: Mild generalized slow No focal features or epileptiform activity are seen     Impression: Diffuse cerebral dysfunction of mild degree but nonspecific.  This is most commonly seen due to toxic/metabolic cause This report is transcribed using the Dragon dictation system.       Results for orders placed during the hospital encounter of 07/22/24    Adult Transthoracic Echo Complete W/ Cont if Necessary Per Protocol (With Agitated Saline)    Interpretation Summary    Left ventricular systolic function is low normal. Left ventricular ejection fraction appears to be 51 - 55%.    Left ventricular wall thickness is consistent with borderline concentric hypertrophy.    Left ventricular diastolic function is consistent with (grade Ia w/high LAP) impaired relaxation.    Left atrial volume is severely increased.    Mild aortic valve stenosis is present. Aortic valve area is 1.1 cm2.    Peak velocity of the flow distal to the aortic valve is 270 cm/s. Aortic valve maximum pressure gradient is 29 mmHg. Aortic valve mean pressure gradient is 16 mmHg.    Estimated right ventricular systolic pressure from tricuspid regurgitation is normal (<35 mmHg). Calculated right ventricular systolic pressure from tricuspid regurgitation is 26 mmHg.    There is a left pleural effusion. There is a right pleural effusion.    Mild to moderate mitral valve regurgitation    EF slightly improved compared to previous study      Current medications:  Scheduled Meds:amLODIPine, 2.5 mg, Oral, Q24H  aspirin, 81 mg, Oral, Daily    Or  aspirin, 300 mg, Rectal, Daily  atorvastatin, 80 mg, Oral, Nightly  carvedilol, 12.5 mg, Oral, BID With Meals  cefTRIAXone, 1,000 mg, Intravenous, Q24H  cetirizine, 10 mg, Oral, Daily  clopidogrel, 75 mg, Oral, Daily  doxycycline, 100 mg, Oral, Q12H  estradiol, 0.5 mg, Oral, Daily  furosemide, 20 mg, Oral, Daily  guaiFENesin, 600 mg, Oral, Q12H  heparin (porcine), 5,000 Units, Subcutaneous, Q8H  levothyroxine, 75 mcg, Oral, Daily  multivitamin with minerals, 1 tablet, Oral, Daily  sacubitril-valsartan, 1 tablet, Oral, Q12H  sodium chloride, 10 mL, Intravenous, Q12H  sodium chloride, 10 mL, Intravenous, Q12H  spironolactone, 25 mg, Oral, Daily      Continuous Infusions:   PRN Meds:.  acetaminophen    senna-docusate sodium **AND** polyethylene glycol **AND** bisacodyl **AND** bisacodyl    hydrOXYzine    meclizine    nitroglycerin    ondansetron ODT **OR** ondansetron    sodium chloride    sodium chloride    sodium chloride    sodium chloride    sodium chloride    Assessment & Plan   Assessment & Plan     Active Hospital Problems    Diagnosis  POA    **TIA (transient ischemic attack) [G45.9]  Yes    Non-ischemic cardiomyopathy [I42.8]  Yes    CKD (chronic kidney disease) stage 3, GFR 30-59 ml/min [N18.30]  Yes    Hypothyroidism [E03.9]  Yes      Resolved Hospital Problems   No resolved problems to display.        Brief Hospital Course to date:  Sylvia Jalloh is a 89 y.o. female with history of NICM/HFrEF, CKD III, HTN, hypothyroidism here with acute dizziness/double vision..     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  Double Vision  -initial CT imaging with R ICA plaque/stenosis, 2 mm saccular aneurysm  -CT perfusion without core infarction  -MRI with acute left thalamic infarct, and punctate white matter infarct in the high anterior left frontal lobe   -on DAPT/statin  -Echo with EF 51-55%  -Stroke Neurology recommends monitoring for A.fib, if no evidence here would plan holter at discharge (has been ordered)  If A.fib found would recommend switching DAPT to Eliquis  -PT/OT, applied eye patch to help w/double vision     E.coli UTI  -continue CTX      Possible Bilateral LL PNA  -CT with bilateral lower lung airspace disease with areas of mucous endobronchial plugging.  -continue rocephin/doxy x5 days  -pulmonary toilet     History of NICM  History of HFrEF  Mild AoVStenosis  - Echo w/EF 51-55%, also noted mild aortic valve stenosis  -continue BB/entresto/diuretics  -previously prescribed Farxiga for HFrEF but instructed to stop, however family notes she may have continued taking it, hold at discharge. She lives alone.     CKD  -stable renal function, at baseline    Expected Discharge Location and Transportation: Home tomorrow w/family. Will repeat PT eval today to see if she would qualify for rehab (family really wanting [facility]), otherwise plan is home. Family requested additional day so that they can prepare everything to bring her home.  Expected Discharge   Expected Discharge Date: 7/26/2024; Expected Discharge Time:      VTE Prophylaxis:  Pharmacologic & mechanical VTE prophylaxis orders are present.         AM-PAC 6 Clicks Score (PT): (P) 16 (07/25/24 8512)    CODE STATUS:   Code Status and Medical Interventions:   Ordered at: 07/22/24 1302     Code Status (Patient has no pulse and is not breathing):    CPR (Attempt to Resuscitate)     Medical Interventions (Patient has pulse or is breathing):    Full Support       Hortencia Ivey, DO  07/25/24

## 2024-07-25 NOTE — PLAN OF CARE
Goal Outcome Evaluation:  Plan of Care Reviewed With: patient           Outcome Evaluation: Patient's mobility continues to be limited by double vision and weakness. She is below her baseline and may benefit from SNF at discharge.      Anticipated Discharge Disposition (PT): skilled nursing facility

## 2024-07-25 NOTE — PLAN OF CARE
Goal Outcome Evaluation:  Plan of Care Reviewed With: patient           Outcome Evaluation: VSS. Alert oriented x4. Confused at times. Ambulates assist x1. Voiding well. NIH 1.

## 2024-07-25 NOTE — THERAPY TREATMENT NOTE
Patient Name: Sylvia Jalloh  : 1934    MRN: [id number]                              Today's Date: 2024       Admit Date: 2024    Visit Dx:     ICD-10-CM ICD-9-CM   1. Double vision  H53.2 368.2   2. Ataxia  R27.0 781.3   3. Cerebrovascular accident (CVA), unspecified mechanism  I63.9 434.91     Patient Active Problem List   Diagnosis    Orthopnea    Hypertensive Emergency on chronic Essential hypertension    Hypothyroidism    Bundle branch block, left    CKD (chronic kidney disease) stage 3, GFR 30-59 ml/min    Acute systolic heart failure    Non-ischemic cardiomyopathy    Acute hypoxemic respiratory failure    TIA (transient ischemic attack)     Past Medical History:   Diagnosis Date    Chronic kidney disease     Disease of thyroid gland     Gallstone pancreatitis     Hypertension     Systolic heart failure secondary to idiopathic cardiomyopathy      Past Surgical History:   Procedure Laterality Date    APPENDECTOMY      BLADDER SUSPENSION      CARDIAC CATHETERIZATION N/A 2017    Procedure: Coronary angiography;  Surgeon: Thiago Vega MD;  Location: [facility] CATH INVASIVE LOCATION;  Service:     CHOLECYSTECTOMY      HYSTERECTOMY        General Information       Row Name 24 183          Physical Therapy Time and Intention    Document Type evaluation  -SC     Mode of Treatment individual therapy;physical therapy  -SC       Row Name 24 183          General Information    Patient Profile Reviewed yes  -SC     Existing Precautions/Restrictions fall;other (see comments)  Diplopia  -SC       Row Name 24          Cognition    Orientation Status (Cognition) oriented to;person;verbal cues/prompts needed for orientation;place;situation;time  -SC       Row Name 24          Safety Issues, Functional Mobility    Impairments Affecting Function (Mobility) balance;strength;endurance/activity tolerance;visual/perceptual;coordination  -SC     Comment, Safety  Issues/Impairments (Mobility) alert, following commands  -SC               User Key  (r) = Recorded By, (t) = Taken By, (c) = Cosigned By      Initials Name Provider Type    SC Bryan Parra PT Physical Therapist                   Mobility       Row Name 07/25/24 1839          Bed Mobility    Bed Mobility supine-sit;scooting/bridging;sit-supine  -SC     Scooting/Bridging [place] (Bed Mobility) modified independence  -SC     Supine-Sit [place] (Bed Mobility) verbal cues;contact guard  -SC     Sit-Supine [place] (Bed Mobility) verbal cues;contact guard  -SC     Assistive Device (Bed Mobility) head of bed elevated;bed rails  -SC     Comment, (Bed Mobility) able to get up using railing and cues. Extra time needed  -SC       Row Name 07/25/24 1839          Transfers    Comment, (Transfers) cues for had placement  -SC       Row Name 07/25/24 1839          Sit-Stand Transfer    Sit-Stand [place] (Transfers) contact guard;verbal cues  -SC     Assistive Device (Sit-Stand Transfers) walker, front-wheeled  -SC     Comment, (Sit-Stand Transfer) cues for hand plaement. Stood with flexed posture due to chronic scoliosis  -SC       Row Name 07/25/24 1839          Gait/Stairs (Locomotion)    [place] Level (Gait) minimum assist (75% patient effort);verbal cues;1 person to manage equipment  -SC     Assistive Device (Gait) walker, front-wheeled  -SC     Distance in Feet (Gait) 120  -SC     Deviations/Abnormal Patterns (Gait) bilateral deviations;earlene decreased;gait speed decreased;stride length decreased;weight shifting decreased  -SC     Bilateral Gait Deviations forward flexed posture;heel strike decreased  -SC     Right Sided Gait Deviations leans right  -SC     Comment, (Gait/Stairs) Gt training focused on controling walker in hallway. Cues for keeping closer to walker. Requried some assist to turn walker. Patient requested to get back to bed  -SC               User Key  (r) = Recorded By, (t) =  Taken By, (c) = Cosigned By      Initials Name Provider Type    SC Bryan Parra, PT Physical Therapist                   Obj/Interventions       [facility] Name 07/25/24 1844          Balance    Balance Assessment standing dynamic balance  -SC     Dynamic Standing Balance minimal assist  -SC     Position/Device Used, Standing Balance supported;walker, front-wheeled  -SC               User Key  (r) = Recorded By, (t) = Taken By, (c) = Cosigned By      Initials Name Provider Type    SC Bryan Parra, PT Physical Therapist                   Goals/Plan    No documentation.                  Clinical Impression       [facility] Name 07/25/24 1844          Pain    Pretreatment Pain Rating 0/10 - no pain  -SC     Posttreatment Pain Rating 0/10 - no pain  -SC       Row Name 07/25/24 1844          Plan of Care Review    Plan of Care Reviewed With patient  -SC     Outcome Evaluation Patient's mobility continues to be limited by double vision and weakness. She is below her baseline and may benefit from SNF at discharge.  -SC       Row Name 07/25/24 1844          Therapy Assessment/Plan (PT)    Patient/Family Therapy Goals Statement (PT) go home  -SC     Rehab Potential (PT) good, to achieve stated therapy goals  -SC     Criteria for Skilled Interventions Met (PT) yes;meets criteria;skilled treatment is necessary  -SC     Therapy Frequency (PT) daily  -[facility] Name 07/25/24 1844          Positioning and Restraints    Pre-Treatment Position in bed  -SC     Post Treatment Position bed  -SC     In Bed supine;exit alarm on;patient within staff view;with family/caregiver  -SC               User Key  (r) = Recorded By, (t) = Taken By, (c) = Cosigned By      Initials Name Provider Type    SC rByan Parra, PT Physical Therapist                   Outcome Measures       Row Name 07/25/24 1847 07/25/24 0855       How much help from another person do you currently need...    Turning from your back to your side while in flat bed without using  bedrails? 3  -SC 3  -BC (r) EA (t) BC (c)    Moving from lying on back to sitting on the side of a flat bed without bedrails? 3  -SC 3  -BC (r) EA (t) BC (c)    Moving to and from a bed to a chair (including a wheelchair)? 3  -SC 3  -BC (r) EA (t) BC (c)    Standing up from a chair using your arms (e.g., wheelchair, bedside chair)? 3  -SC 3  -BC (r) EA (t) BC (c)    Climbing 3-5 steps with a railing? 3  -SC 2  -BC (r) EA (t) BC (c)    To walk in hospital room? 3  -SC 2  -BC (r) EA (t) BC (c)    AM-PAC 6 Clicks Score (PT) 18  -SC 16  -BC (r) EA (t)    Highest Level of Mobility Goal 6 --> Walk 10 steps or more  -SC 5 --> Static standing  -BC (r) EA (t)      Row Name 07/25/24 1847          Functional Assessment    Outcome Measure Options AM-PAC 6 Clicks Basic Mobility (PT)  -SC               User Key  (r) = Recorded By, (t) = Taken By, (c) = Cosigned By      Initials Name Provider Type    SC Bryan Parra PT Physical Therapist    Hillary Hernandez, RN Registered Nurse    Coco Palafox, PCT Technician                                 Physical Therapy Education       Title: PT OT SLP Therapies (In Progress)       Topic: Physical Therapy (Done)       Point: Mobility training (Done)       Learning Progress Summary             Patient MOSES Massey VU by SC at 7/25/2024 1847    Comment: reviewed benefits of walking    Acceptance, E, VU by  at 7/23/2024 1632    Comment: Pt educated on safe mobility with visual scanning of environment with eye patch                         Point: Home exercise program (Done)       Learning Progress Summary             Patient MOSES Massey VU by SC at 7/25/2024 1847    Comment: reviewed benefits of walking                         Point: Body mechanics (Done)       Learning Progress Summary             Patient MOSES Massey VU by SC at 7/25/2024 1847    Comment: reviewed benefits of walking    Acceptance, E, VU by  at 7/23/2024 1632    Comment: Pt educated on safe mobility with visual scanning of  environment with eye patch                         Point: Precautions (Done)       Learning Progress Summary             Patient EagerMOSES VU by SC at 7/25/2024 1847    Comment: reviewed benefits of walking    Kaylan MOSES VU by  at 7/23/2024 1632    Comment: Pt educated on safe mobility with visual scanning of environment with eye patch                                         User Key       Initials Effective Dates Name Provider Type Discipline    SC 02/03/23 -  Bryan Parra, PT Physical Therapist PT     05/07/24 -  Jackelyn Hilton, PT Student PT Student PT                  PT Recommendation and Plan     Plan of Care Reviewed With: patient  Outcome Evaluation: Patient's mobility continues to be limited by double vision and weakness. She is below her baseline and may benefit from SNF at discharge.     Time Calculation:         PT Charges       Row Name 07/25/24 1709             Time Calculation    Start Time 1709  -SC      PT Received On 07/25/24  -SC      PT Goal Re-Cert Due Date 08/02/24  -SC         Timed Charges    74173 - Gait Training Minutes  10  -SC      77822 - PT Therapeutic Activity Minutes 15  -SC         Total Minutes    Timed Charges Total Minutes 25  -SC       Total Minutes 25  -SC                User Key  (r) = Recorded By, (t) = Taken By, (c) = Cosigned By      Initials Name Provider Type    SC Fidel, Bryan CARROLL, PT Physical Therapist                  Therapy Charges for Today       Code Description Service Date Service Provider Modifiers Qty    05634072193 HC GAIT TRAINING EA 15 MIN 7/25/2024 Bryan Parra, PT GP 1    98716712991 HC PT THERAPEUTIC ACT EA 15 MIN 7/25/2024 Bryan Parra, PT GP 1            PT G-Codes  Outcome Measure Options: AM-PAC 6 Clicks Basic Mobility (PT)  AM-PAC 6 Clicks Score (PT): 18  AM-PAC 6 Clicks Score (OT): 15  Modified [place] Scale: 3 - Moderate disability.  Requiring some help, but able to walk without assistance.  PT Discharge Summary  Anticipated  Discharge Disposition (PT): skilled nursing facility    Bryan Parra, PT  7/25/2024

## 2024-07-25 NOTE — PLAN OF CARE
Problem: Adult Inpatient Plan of Care  Goal: Plan of Care Review  Outcome: Ongoing, Progressing  Goal: Patient-Specific Goal (Individualized)  Outcome: Ongoing, Progressing  Goal: Absence of Hospital-Acquired Illness or Injury  Outcome: Ongoing, Progressing  Intervention: Identify and Manage Fall Risk  Description: Perform standard risk assessment on admission using a validated tool or comprehensive approach appropriate to the patient; reassess fall risk frequently, with change in status or transfer to another level of care.  Communicate fall injury risk to interprofessional healthcare team.  Determine need for increased observation, equipment and environmental modification, such as low bed, signage and supportive, nonskid footwear.  Adjust safety measures to individual developmental age, stage and identified risk factors.  Reinforce the importance of safety and physical activity with patient and family.  Perform regular intentional rounding to assess need for position change, pain assessment and personal needs, including assistance with toileting.  Recent Flowsheet Documentation  Taken 7/25/2024 0638 by Stephanie Moya, RN  Safety Promotion/Fall Prevention:   activity supervised   assistive device/personal items within reach   mobility aid in reach   lighting adjusted   fall prevention program maintained   nonskid shoes/slippers when out of bed   room organization consistent   safety round/check completed   toileting scheduled  Taken 7/25/2024 0416 by Stephanie Moya, RN  Safety Promotion/Fall Prevention:   activity supervised   assistive device/personal items within reach   mobility aid in reach   lighting adjusted   fall prevention program maintained   nonskid shoes/slippers when out of bed   room organization consistent   safety round/check completed   toileting scheduled  Taken 7/25/2024 0200 by Stephanie Moya, RN  Safety Promotion/Fall Prevention:   activity supervised   assistive device/personal  items within reach   fall prevention program maintained   lighting adjusted   mobility aid in reach   gait belt   nonskid shoes/slippers when out of bed   room organization consistent   safety round/check completed   toileting scheduled  Taken 7/25/2024 0016 by Stephanie Moya RN  Safety Promotion/Fall Prevention:   activity supervised   assistive device/personal items within reach   fall prevention program maintained   lighting adjusted   mobility aid in reach   gait belt   nonskid shoes/slippers when out of bed   room organization consistent   safety round/check completed   toileting scheduled  Taken 7/24/2024 2239 by Stephanie Moya RN  Safety Promotion/Fall Prevention:   activity supervised   assistive device/personal items within reach   mobility aid in reach   lighting adjusted   fall prevention program maintained   nonskid shoes/slippers when out of bed   room organization consistent   safety round/check completed   toileting scheduled  Taken 7/24/2024 2212 by Stephanie Moya RN  Safety Promotion/Fall Prevention:   activity supervised   assistive device/personal items within reach   fall prevention program maintained   lighting adjusted   mobility aid in reach   gait belt   nonskid shoes/slippers when out of bed   room organization consistent   safety round/check completed   toileting scheduled  Taken 7/24/2024 2050 by Stephanie Moya RN  Safety Promotion/Fall Prevention:   activity supervised   assistive device/personal items within reach   clutter free environment maintained   gait belt   lighting adjusted   mobility aid in reach   muscle strengthening facilitated   nonskid shoes/slippers when out of bed   room organization consistent   safety round/check completed  Intervention: Prevent Skin Injury  Description: Perform a screening for skin injury risk, such as pressure or moisture associated skin damage on admission and at regular intervals throughout hospital stay.  Keep all areas of skin  (especially folds) clean and dry.  Maintain adequate skin hydration.  Relieve and redistribute pressure and protect bony prominences; implement measures based on patient-specific risk factors.  Match turning and repositioning schedule to clinical condition.  Encourage weight shift frequently; assist with reposition if unable to complete independently.  Float heels off bed; avoid pressure on the Achilles tendon.  Keep skin free from extended contact with medical devices.  Encourage functional activity and mobility, as early as tolerated.  Use aids (e.g., slide boards, mechanical lift) during transfer.  Recent Flowsheet Documentation  Taken 7/25/2024 0638 by Stephanie Moya RN  Skin Protection:   adhesive use limited   skin-to-device areas padded   skin-to-skin areas padded   transparent dressing maintained   tubing/devices free from skin contact  Taken 7/25/2024 0416 by Stephanie Moya RN  Skin Protection:   adhesive use limited   skin-to-device areas padded   skin-to-skin areas padded   transparent dressing maintained   tubing/devices free from skin contact  Taken 7/25/2024 0200 by Stephanie Moya RN  Skin Protection:   adhesive use limited   skin-to-device areas padded   skin-to-skin areas padded   transparent dressing maintained   tubing/devices free from skin contact  Taken 7/25/2024 0016 by Stephanie Moya RN  Skin Protection:   adhesive use limited   skin-to-device areas padded   skin-to-skin areas padded   transparent dressing maintained   tubing/devices free from skin contact  Taken 7/24/2024 2239 by Stephanie Moya RN  Skin Protection:   adhesive use limited   skin-to-device areas padded   skin-to-skin areas padded   transparent dressing maintained   tubing/devices free from skin contact  Taken 7/24/2024 2212 by Stephanie Moya RN  Skin Protection:   adhesive use limited   skin-to-device areas padded   skin-to-skin areas padded   transparent dressing maintained   tubing/devices free  from skin contact  Taken 7/24/2024 2050 by Stephanie Moya RN  Skin Protection:   adhesive use limited   incontinence pads utilized   tubing/devices free from skin contact   transparent dressing maintained  Intervention: Prevent and Manage VTE (Venous Thromboembolism) Risk  Description: Assess for VTE (venous thromboembolism) risk.  Encourage and assist with early ambulation.  Initiate and maintain compression or other therapy, as indicated, based on identified risk in accordance with organizational protocol and provider order.  Encourage both active and passive leg exercises while in bed, if unable to ambulate.  Recent Flowsheet Documentation  Taken 7/25/2024 0638 by Stephanie Moya RN  Activity Management: activity encouraged  Taken 7/25/2024 0416 by Stephanie Moya RN  Activity Management: activity encouraged  Taken 7/25/2024 0200 by Stephanie Moya RN  Activity Management: activity encouraged  Taken 7/25/2024 0016 by Stephanie Moya RN  Activity Management: activity encouraged  Taken 7/24/2024 2239 by Stephanie Moya RN  Activity Management: activity encouraged  Taken 7/24/2024 2212 by Stephanie Moya RN  Activity Management: activity encouraged  Taken 7/24/2024 2050 by Stephanie Moya RN  VTE Prevention/Management: (subQ heparin) other (see comments)  Range of Motion: active ROM (range of motion) encouraged  Intervention: Prevent Infection  Description: Maintain skin and mucous membrane integrity; promote hand, oral and pulmonary hygiene.  Optimize fluid balance, nutrition, sleep and glycemic control to maximize infection resistance.  Identify potential sources of infection early to prevent or mitigate progression of infection (e.g., wound, lines, devices).  Evaluate ongoing need for invasive devices; remove promptly when no longer indicated.  Recent Flowsheet Documentation  Taken 7/25/2024 0638 by Stephanie Moya RN  Infection Prevention:   environmental surveillance  performed   single patient room provided  Taken 7/25/2024 0416 by Stephanie Moya RN  Infection Prevention:   environmental surveillance performed   single patient room provided  Taken 7/25/2024 0200 by Stephanie Moya RN  Infection Prevention:   single patient room provided   environmental surveillance performed  Taken 7/25/2024 0016 by Stephanie Moya RN  Infection Prevention:   single patient room provided   environmental surveillance performed  Taken 7/24/2024 2239 by Stephanie Moya RN  Infection Prevention:   environmental surveillance performed   single patient room provided  Taken 7/24/2024 2212 by Stephanie Moya RN  Infection Prevention:   single patient room provided   environmental surveillance performed  Taken 7/24/2024 2050 by Stephanie Moya RN  Infection Prevention:   visitors restricted/screened   single patient room provided  Goal: Optimal Comfort and Wellbeing  Outcome: Ongoing, Progressing  Intervention: Monitor Pain and Promote Comfort  Description: Assess pain level, treatment efficacy and patient response at regular intervals using a consistent pain scale.  Consider the presence and impact of preexisting chronic pain.  Encourage patient and caregiver involvement in pain assessment, interventions and safety measures.  Recent Flowsheet Documentation  Taken 7/24/2024 2239 by Stephanie Moya RN  Pain Management Interventions:   see MAR   pillow support provided   position adjusted  Taken 7/24/2024 2050 by Stephanie Moya RN  Pain Management Interventions:   pillow support provided   position adjusted  Intervention: Provide Person-Centered Care  Description: Use a family-focused approach to care.  Develop trust and rapport by proactively providing information, encouraging questions, addressing concerns and offering reassurance.  Acknowledge emotional response to hospitalization.  Recognize and utilize personal coping strategies.  Honor spiritual and cultural  preferences.  Recent Flowsheet Documentation  Taken 7/24/2024 2050 by Stephanie Moya RN  Trust Relationship/Rapport:   care explained   choices provided  Goal: Readiness for Transition of Care  Outcome: Ongoing, Progressing     Problem: Skin Injury Risk Increased  Goal: Skin Health and Integrity  Outcome: Ongoing, Progressing  Intervention: Optimize Skin Protection  Description: Perform a full pressure injury risk assessment, as indicated by screening, upon admission to care unit.  Reassess skin (injury risk, full inspection) frequently (e.g., scheduled interval, with change in condition) to provide optimal early detection and prevention.  Maintain adequate tissue perfusion (e.g., encourage fluid balance; avoid crossing legs, constrictive clothing or devices) to promote tissue oxygenation.  Maintain head of bed at lowest degree of elevation tolerated, considering medical condition and other restrictions.  Avoid positioning onto an area that remains reddened.  Minimize incontinence and moisture (e.g., toileting schedule; moisture-wicking pad, diaper or incontinence collection device; skin moisture barrier).  Cleanse skin promptly and gently when soiled utilizing a pH-balanced cleanser.  Relieve and redistribute pressure (e.g., scheduled position changes, weight shifts, use of support surface, medical device repositioning, protective dressing application, use of positioning device, microclimate control, use of pressure-injury-monitor  Encourage increased activity, such as sitting in a chair at the bedside or early mobilization, when able to tolerate.  Recent Flowsheet Documentation  Taken 7/25/2024 0638 by Stephanie Moya, RN  Pressure Reduction Techniques:   weight shift assistance provided   heels elevated off bed  Head of Bed (HOB) Positioning: HOB elevated  Pressure Reduction Devices: pressure-redistributing mattress utilized  Skin Protection:   adhesive use limited   skin-to-device areas padded    skin-to-skin areas padded   transparent dressing maintained   tubing/devices free from skin contact  Taken 7/25/2024 0416 by Stephanie Moya RN  Pressure Reduction Techniques:   weight shift assistance provided   heels elevated off bed  Head of Bed ([facility]) Positioning: [facility] elevated  Pressure Reduction Devices: pressure-redistributing mattress utilized  Skin Protection:   adhesive use limited   skin-to-device areas padded   skin-to-skin areas padded   transparent dressing maintained   tubing/devices free from skin contact  Taken 7/25/2024 0200 by Stephanie Moya RN  Pressure Reduction Techniques:   weight shift assistance provided   heels elevated off bed  Head of Bed ([facility]) Positioning: [facility] elevated  Pressure Reduction Devices: pressure-redistributing mattress utilized  Skin Protection:   adhesive use limited   skin-to-device areas padded   skin-to-skin areas padded   transparent dressing maintained   tubing/devices free from skin contact  Taken 7/25/2024 0016 by Stephanie Moya RN  Pressure Reduction Techniques:   weight shift assistance provided   heels elevated off bed  Head of Bed ([facility]) Positioning: [facility] elevated  Pressure Reduction Devices: pressure-redistributing mattress utilized  Skin Protection:   adhesive use limited   skin-to-device areas padded   skin-to-skin areas padded   transparent dressing maintained   tubing/devices free from skin contact  Taken 7/24/2024 2239 by Stephanie Moya RN  Pressure Reduction Techniques:   weight shift assistance provided   heels elevated off bed  Head of Bed ([facility]) Positioning: [facility] elevated  Pressure Reduction Devices: pressure-redistributing mattress utilized  Skin Protection:   adhesive use limited   skin-to-device areas padded   skin-to-skin areas padded   transparent dressing maintained   tubing/devices free from skin contact  Taken 7/24/2024 2212 by Stephanie Moya RN  Pressure Reduction Techniques:   weight shift assistance provided   heels elevated  off bed  Head of Bed (HOB) Positioning: HOB elevated  Pressure Reduction Devices: pressure-redistributing mattress utilized  Skin Protection:   adhesive use limited   skin-to-device areas padded   skin-to-skin areas padded   transparent dressing maintained   tubing/devices free from skin contact  Taken 7/24/2024 2050 by Stephanie Moya, RN  Pressure Reduction Techniques:   frequent weight shift encouraged   weight shift assistance provided  Pressure Reduction Devices: pressure-redistributing mattress utilized  Skin Protection:   adhesive use limited   incontinence pads utilized   tubing/devices free from skin contact   transparent dressing maintained     Problem: Fall Injury Risk  Goal: Absence of Fall and Fall-Related Injury  Outcome: Ongoing, Progressing  Intervention: Identify and Manage Contributors  Description: Develop a fall prevention plan with the patient and caregiver/family.  Provide reorientation, appropriate sensory stimulation and routines with changes in mental status to decrease risk of fall.  Promote use of personal vision and auditory aids.  Assess assistance level required for safe and effective self-care; provide support as needed, such as toileting, mobilization. For age 65 and older, implement timed toileting with assistance.  Encourage physical activity, such as performance of mobility and self-care at highest level of patient ability, multicomponent exercise program and provision of appropriate assistive devices.  If fall occurs, assess the severity of injury; implement fall injury protocol. Determine the cause and revise fall injury prevention plan.  Regularly review medication contribution to fall risk; adjust medication administration times to minimize risk of falling.  Consider risk related to polypharmacy and age.  Balance adequate pain management with potential for oversedation.  Recent Flowsheet Documentation  Taken 7/25/2024 0638 by Stephanie Moya, RN  Medication  Review/Management: medications reviewed  Taken 7/25/2024 0416 by Stephanie Moya RN  Medication Review/Management: medications reviewed  Taken 7/25/2024 0200 by Stephanie Moya RN  Medication Review/Management: medications reviewed  Taken 7/25/2024 0016 by Stephanie Moya RN  Medication Review/Management: medications reviewed  Taken 7/24/2024 2239 by Stephanie Moya RN  Medication Review/Management: medications reviewed  Taken 7/24/2024 2212 by Stephanie Moya RN  Medication Review/Management: medications reviewed  Taken 7/24/2024 2050 by Stephanie Moya RN  Medication Review/Management: medications reviewed  Intervention: Promote Injury-Free Environment  Description: Provide a safe, barrier-free environment that encourages independent activity.  Keep care area uncluttered and well-lighted.  Determine need for increased observation or monitoring.  Avoid use of devices that minimize mobility, such as restraints or indwelling urinary catheter.  Recent Flowsheet Documentation  Taken 7/25/2024 0638 by Stephanie Moya RN  Safety Promotion/Fall Prevention:   activity supervised   assistive device/personal items within reach   mobility aid in reach   lighting adjusted   fall prevention program maintained   nonskid shoes/slippers when out of bed   room organization consistent   safety round/check completed   toileting scheduled  Taken 7/25/2024 0416 by Stephanie Moya RN  Safety Promotion/Fall Prevention:   activity supervised   assistive device/personal items within reach   mobility aid in reach   lighting adjusted   fall prevention program maintained   nonskid shoes/slippers when out of bed   room organization consistent   safety round/check completed   toileting scheduled  Taken 7/25/2024 0200 by Stephanie Moya RN  Safety Promotion/Fall Prevention:   activity supervised   assistive device/personal items within reach   fall prevention program maintained   lighting adjusted   mobility  aid in reach   gait belt   nonskid shoes/slippers when out of bed   room organization consistent   safety round/check completed   toileting scheduled  Taken 7/25/2024 0016 by Stephanie Moya RN  Safety Promotion/Fall Prevention:   activity supervised   assistive device/personal items within reach   fall prevention program maintained   lighting adjusted   mobility aid in reach   gait belt   nonskid shoes/slippers when out of bed   room organization consistent   safety round/check completed   toileting scheduled  Taken 7/24/2024 2239 by Stephanie Moya RN  Safety Promotion/Fall Prevention:   activity supervised   assistive device/personal items within reach   mobility aid in reach   lighting adjusted   fall prevention program maintained   nonskid shoes/slippers when out of bed   room organization consistent   safety round/check completed   toileting scheduled  Taken 7/24/2024 2212 by Stephanie Moya RN  Safety Promotion/Fall Prevention:   activity supervised   assistive device/personal items within reach   fall prevention program maintained   lighting adjusted   mobility aid in reach   gait belt   nonskid shoes/slippers when out of bed   room organization consistent   safety round/check completed   toileting scheduled  Taken 7/24/2024 2050 by Stephanie Moya, RN  Safety Promotion/Fall Prevention:   activity supervised   assistive device/personal items within reach   clutter free environment maintained   gait belt   lighting adjusted   mobility aid in reach   muscle strengthening facilitated   nonskid shoes/slippers when out of bed   room organization consistent   safety round/check completed     Problem: Heart Failure Comorbidity  Goal: Maintenance of Heart Failure Symptom Control  Outcome: Ongoing, Progressing  Intervention: Maintain Heart Failure-Management  Description: Evaluate adherence to home heart failure self-care regimen (e.g., medication, fluid balance, sodium intake, daily weight, physical  activity, telemonitoring, support).  Advocate continuation of home medication and schedule.  Consider pharmacologic therapy administration time and effects (e.g., avoid giving diuretic prior to bedtime or nitrates on empty stomach).  Monitor response to pharmacologic therapy, including weight fluctuations, blood pressure and electrolyte levels.  Monitor for signs and symptoms of anxiety and depression, including severity and duration; if present, provide psychosocial support.  Consider need for heart failure clinic or palliative care consult.  Recent Flowsheet Documentation  Taken 7/25/2024 0638 by Stephanie Moya RN  Medication Review/Management: medications reviewed  Taken 7/25/2024 0416 by Stephanie Moya RN  Medication Review/Management: medications reviewed  Taken 7/25/2024 0200 by Stephanie Moya RN  Medication Review/Management: medications reviewed  Taken 7/25/2024 0016 by Stephanie Moya RN  Medication Review/Management: medications reviewed  Taken 7/24/2024 2239 by Stephanie Moya RN  Medication Review/Management: medications reviewed  Taken 7/24/2024 2212 by Stephanie Moya RN  Medication Review/Management: medications reviewed  Taken 7/24/2024 2050 by Stephanie Moya RN  Medication Review/Management: medications reviewed     Problem: Hypertension Comorbidity  Goal: Blood Pressure in Desired Range  Outcome: Ongoing, Progressing  Intervention: Maintain Blood Pressure Management  Description: Evaluate adherence to home antihypertensive regimen (e.g., exercise and activity, diet modification, medication).  Provide scheduled antihypertensive medication; consider administration time and effects (e.g., avoid giving diuretic prior to bedtime).  Monitor response to antihypertensive medication therapy (e.g., blood pressure, electrolyte levels, medication effects).  Minimize risk of orthostatic hypotension; encourage caution with position changes, particularly if elderly.  Recent  Flowsheet Documentation  Taken 7/25/2024 0638 by Stephanie Moya RN  Medication Review/Management: medications reviewed  Taken 7/25/2024 0416 by Stephanie Moya RN  Medication Review/Management: medications reviewed  Taken 7/25/2024 0200 by Stephanie Moya RN  Medication Review/Management: medications reviewed  Taken 7/25/2024 0016 by Stephanie Moya RN  Medication Review/Management: medications reviewed  Taken 7/24/2024 2239 by Stephanie Moya RN  Medication Review/Management: medications reviewed  Taken 7/24/2024 2212 by Stephanie Moya RN  Medication Review/Management: medications reviewed  Taken 7/24/2024 2050 by Stephanie Moya RN  Medication Review/Management: medications reviewed   Goal Outcome Evaluation:      Patient alert, orientation waxes and wanes. Pleasant, compliant with treatment regimen. Incontinent of bowel and bladder overnight. No acute events overnight, NIH 1. Patient to discharge with home health within 1-2 days per CM note. Stephanie Moya RN

## 2024-07-25 NOTE — CASE MANAGEMENT/SOCIAL WORK
Continued Stay Note  [facility]     Patient Name: Sylvia Jalloh  MRN: [id number]  Today's Date: 7/25/2024    Admit Date: 7/22/2024    Plan: Home with HH & [facility]   Discharge Plan       Row Name 07/25/24 0934       Plan    Plan Home with HH & [facility]    Patient/Family in Agreement with Plan yes    Plan Comments CM spoke to daughter and son at bedside. Plan is Home with HH at discharge at this time. Family were asking about rehab and long term care and CM explained that patient is walking 150 feet and would not be approved by [employer] for rehab. CM gave son a copy of the sitter list and patient has been referred to [facility]. CM will continue to follow.    Final Discharge Disposition Code 06 - home with home health care                   Discharge Codes    No documentation.                 Expected Discharge Date and Time       Expected Discharge Date Expected Discharge Time    Jul 26, 2024               Afshin Kauffman RN

## 2024-07-25 NOTE — CASE MANAGEMENT/SOCIAL WORK
Continued Stay Note  [facility]     Patient Name: Sylvia Jalloh  MRN: [id number]  Today's Date: 7/25/2024    Admit Date: 7/22/2024    Plan: [facility] care   Discharge Plan       Row Name 07/25/24 1231       Plan    Plan [facility] care    Patient/Family in Agreement with Plan yes    Plan Comments CM spoke with daughter, Yohana at bedside and the family is interested in [facility]bit care at [facility]. CM called Cely at [facility] and made the referral. CM will continue to follow.    Final Discharge Disposition Code 01 - home or self-care                   Discharge Codes    No documentation.                 Expected Discharge Date and Time       Expected Discharge Date Expected Discharge Time    Jul 26, 2024               Afshin Kauffman RN     Please see the attached refill request.

## 2024-07-26 VITALS
DIASTOLIC BLOOD PRESSURE: 63 MMHG | BODY MASS INDEX: 20.61 KG/M2 | TEMPERATURE: 98.2 F | HEIGHT: 62 IN | SYSTOLIC BLOOD PRESSURE: 133 MMHG | HEART RATE: 76 BPM | OXYGEN SATURATION: 95 % | WEIGHT: 112 LBS | RESPIRATION RATE: 18 BRPM

## 2024-07-26 DIAGNOSIS — R00.2 PALPITATIONS: ICD-10-CM

## 2024-07-26 DIAGNOSIS — G45.9 INTERMITTENT CEREBRAL ISCHEMIA: Primary | ICD-10-CM

## 2024-07-26 PROCEDURE — 97116 GAIT TRAINING THERAPY: CPT

## 2024-07-26 PROCEDURE — 99239 HOSP IP/OBS DSCHRG MGMT >30: CPT | Performed by: NURSE PRACTITIONER

## 2024-07-26 PROCEDURE — 25010000002 HEPARIN (PORCINE) PER 1000 UNITS: Performed by: HOSPITALIST

## 2024-07-26 PROCEDURE — 97530 THERAPEUTIC ACTIVITIES: CPT

## 2024-07-26 PROCEDURE — 25010000002 CEFTRIAXONE PER 250 MG: Performed by: NURSE PRACTITIONER

## 2024-07-26 RX ORDER — ATORVASTATIN CALCIUM 80 MG/1
80 TABLET, FILM COATED ORAL NIGHTLY
Qty: 3 TABLET | Refills: 0 | Status: SHIPPED | OUTPATIENT
Start: 2024-07-26 | End: 2024-07-29

## 2024-07-26 RX ORDER — ATORVASTATIN CALCIUM 80 MG/1
80 TABLET, FILM COATED ORAL NIGHTLY
Start: 2024-07-26 | End: 2024-07-26

## 2024-07-26 RX ORDER — CLOPIDOGREL BISULFATE 75 MG/1
75 TABLET ORAL DAILY
Qty: 3 TABLET | Refills: 0 | Status: SHIPPED | OUTPATIENT
Start: 2024-07-27 | End: 2024-07-30

## 2024-07-26 RX ORDER — DOXYCYCLINE 100 MG/1
100 CAPSULE ORAL EVERY 12 HOURS SCHEDULED
Qty: 2 CAPSULE | Refills: 0 | Status: SHIPPED | OUTPATIENT
Start: 2024-07-26 | End: 2024-07-26

## 2024-07-26 RX ORDER — AMLODIPINE BESYLATE 2.5 MG/1
2.5 TABLET ORAL DAILY
Start: 2024-07-26

## 2024-07-26 RX ORDER — CLOPIDOGREL BISULFATE 75 MG/1
75 TABLET ORAL DAILY
Start: 2024-07-27 | End: 2024-07-26

## 2024-07-26 RX ORDER — DOXYCYCLINE 100 MG/1
100 CAPSULE ORAL EVERY 12 HOURS SCHEDULED
Qty: 2 CAPSULE | Refills: 0 | Status: SHIPPED | OUTPATIENT
Start: 2024-07-26 | End: 2024-07-27

## 2024-07-26 RX ORDER — ASPIRIN 81 MG/1
81 TABLET, CHEWABLE ORAL DAILY
Qty: 3 TABLET | Refills: 0 | Status: SHIPPED | OUTPATIENT
Start: 2024-07-27 | End: 2024-07-30

## 2024-07-26 RX ORDER — GUAIFENESIN 600 MG/1
600 TABLET, EXTENDED RELEASE ORAL EVERY 12 HOURS SCHEDULED
Start: 2024-07-26 | End: 2024-07-26 | Stop reason: HOSPADM

## 2024-07-26 RX ORDER — ASPIRIN 81 MG/1
81 TABLET, CHEWABLE ORAL DAILY
Start: 2024-07-27 | End: 2024-07-26

## 2024-07-26 RX ADMIN — CLOPIDOGREL BISULFATE 75 MG: 75 TABLET ORAL at 08:52

## 2024-07-26 RX ADMIN — LEVOTHYROXINE SODIUM 75 MCG: 0.07 TABLET ORAL at 05:22

## 2024-07-26 RX ADMIN — Medication 10 ML: at 08:52

## 2024-07-26 RX ADMIN — SPIRONOLACTONE 25 MG: 25 TABLET ORAL at 08:52

## 2024-07-26 RX ADMIN — FUROSEMIDE 20 MG: 20 TABLET ORAL at 08:52

## 2024-07-26 RX ADMIN — Medication 1 TABLET: at 08:51

## 2024-07-26 RX ADMIN — CETIRIZINE HYDROCHLORIDE 10 MG: 10 TABLET, FILM COATED ORAL at 08:51

## 2024-07-26 RX ADMIN — GUAIFENESIN 600 MG: 600 TABLET, EXTENDED RELEASE ORAL at 08:51

## 2024-07-26 RX ADMIN — CARVEDILOL 12.5 MG: 12.5 TABLET, FILM COATED ORAL at 08:51

## 2024-07-26 RX ADMIN — ESTRADIOL 0.5 MG: 0.5 TABLET ORAL at 08:49

## 2024-07-26 RX ADMIN — AMLODIPINE BESYLATE 2.5 MG: 2.5 TABLET ORAL at 08:52

## 2024-07-26 RX ADMIN — DOXYCYCLINE 100 MG: 100 CAPSULE ORAL at 08:51

## 2024-07-26 RX ADMIN — SODIUM CHLORIDE 1000 MG: 900 INJECTION INTRAVENOUS at 12:02

## 2024-07-26 RX ADMIN — HEPARIN SODIUM 5000 UNITS: 5000 INJECTION INTRAVENOUS; SUBCUTANEOUS at 05:22

## 2024-07-26 RX ADMIN — ASPIRIN 81 MG CHEWABLE TABLET 81 MG: 81 TABLET CHEWABLE at 08:51

## 2024-07-26 RX ADMIN — SACUBITRIL AND VALSARTAN 1 TABLET: 49; 51 TABLET, FILM COATED ORAL at 08:49

## 2024-07-26 NOTE — THERAPY TREATMENT NOTE
Patient Name: Sylvia Jalloh  : 1934    MRN: [id number]                              Today's Date: 2024       Admit Date: 2024    Visit Dx:     ICD-10-CM ICD-9-CM   1. Double vision  H53.2 368.2   2. Ataxia  R27.0 781.3   3. Cerebrovascular accident (CVA), unspecified mechanism  I63.9 434.91     Patient Active Problem List   Diagnosis    Orthopnea    Hypertensive Emergency on chronic Essential hypertension    Hypothyroidism    Bundle branch block, left    CKD (chronic kidney disease) stage 3, GFR 30-59 ml/min    Acute systolic heart failure    Non-ischemic cardiomyopathy    Acute hypoxemic respiratory failure    TIA (transient ischemic attack)     Past Medical History:   Diagnosis Date    Chronic kidney disease     Disease of thyroid gland     Gallstone pancreatitis     Hypertension     Systolic heart failure secondary to idiopathic cardiomyopathy      Past Surgical History:   Procedure Laterality Date    APPENDECTOMY      BLADDER SUSPENSION      CARDIAC CATHETERIZATION N/A 2017    Procedure: Coronary angiography;  Surgeon: Thiago Vega MD;  Location: On license of [facility] CATH INVASIVE LOCATION;  Service:     CHOLECYSTECTOMY      HYSTERECTOMY        General Information       Row Name 24 1147          Physical Therapy Time and Intention    Document Type therapy note (daily note)  -SC     Mode of Treatment individual therapy;physical therapy  -SC       Row Name 24 1147          General Information    Patient Profile Reviewed yes  -SC     Existing Precautions/Restrictions fall;other (see comments)  diplopia  -SC       Row Name 24 1147          Cognition    Orientation Status (Cognition) oriented to;person;verbal cues/prompts needed for orientation;place;situation;time  -SC       Row Name 24 1147          Safety Issues, Functional Mobility    Impairments Affecting Function (Mobility) balance;strength;endurance/activity tolerance;visual/perceptual;coordination  -SC     Comment,  Safety Issues/Impairments (Mobility) alert, following commands  -SC               User Key  (r) = Recorded By, (t) = Taken By, (c) = Cosigned By      Initials Name Provider Type    SC Bryan Parra PT Physical Therapist                   Mobility       Row Name 07/26/24 1148          Bed Mobility    Bed Mobility sit-supine;scooting/bridging  -SC     Scooting/Bridging [place] (Bed Mobility) independent  -SC     Supine-Sit [place] (Bed Mobility) verbal cues;contact guard  -SC     Assistive Device (Bed Mobility) head of bed elevated  -SC     Comment, (Bed Mobility) worked on getting up to edge of bed without use of handrails. Demonstrated good technique  -SC       Row Name 07/26/24 1148          Bed-Chair Transfer    Bed-Chair [place] (Transfers) contact guard;verbal cues  -SC     Comment, (Bed-Chair Transfer) worked on transfers from bed to BSC and back with cues for hand placement on railings.  Demonstrated good technique. Walker not used  -SC       Row Name 07/26/24 1148          Sit-Stand Transfer    Sit-Stand [place] (Transfers) contact guard;verbal cues  -SC     Assistive Device (Sit-Stand Transfers) other (see comments)  UE support  -SC     Comment, (Sit-Stand Transfer) stood from edge of bed with HHA  -SC       Row Name 07/26/24 1148          Gait/Stairs (Locomotion)    [place] Level (Gait) 2 person assist;minimum assist (75% patient effort)  -SC     Assistive Device (Gait) other (see comments)  UE support  -SC     Distance in Feet (Gait) 144  -SC     Deviations/Abnormal Patterns (Gait) bilateral deviations;earlene decreased;gait speed decreased;stride length decreased;weight shifting decreased  -SC     Bilateral Gait Deviations forward flexed posture;heel strike decreased  -SC     Right Sided Gait Deviations leans right  -SC     Comment, (Gait/Stairs) Gt training focused on sustaining upright posture while walking in hallway. B UE support used to help activate trunk extension.  Patient with some LOB to right requrieing min assist.  -SC               User Key  (r) = Recorded By, (t) = Taken By, (c) = Cosigned By      Initials Name Provider Type    Bryan Richmond PT Physical Therapist                   Obj/Interventions       [facility] Name 07/26/24 1154          Motor Skills    Therapeutic Exercise knee  -SC       Row Name 07/26/24 1154          Knee (Therapeutic Exercise)    Knee (Therapeutic Exercise) AAROM (active assistive range of motion)  -SC     Knee AAROM (Therapeutic Exercise) bilateral;flexion;extension;sitting;10 repetitions  -SC       Row Name 07/26/24 1154          Balance    Balance Assessment standing dynamic balance  -SC     Dynamic Standing Balance 1-person assist;minimal assist  -SC     Position/Device Used, Standing Balance supported;walker, rolling;other (see comments)  also UE support  -SC     Comment, Balance has LOB with UE support, more stable with walker  -SC               User Key  (r) = Recorded By, (t) = Taken By, (c) = Cosigned By      Initials Name Provider Type    SC Bryan Parra PT Physical Therapist                   Goals/Plan    No documentation.                  Clinical Impression       [facility] Name 07/26/24 1156          Pain Scale: FACES Pre/Post-Treatment    Pain: FACES Scale, Pretreatment 0-->no hurt  -SC     Posttreatment Pain Rating 0-->no hurt  -SC       Row Name 07/26/24 1156          Plan of Care Review    Plan of Care Reviewed With patient  -SC     Progress improving  -SC     Outcome Evaluation Patient demonstrated improving mobility overall. She continues to be below her baseline and I continue to recommend SNF  -SC       Row Name 07/26/24 1156          Therapy Assessment/Plan (PT)    Patient/Family Therapy Goals Statement (PT) go home  -SC     Rehab Potential (PT) good, to achieve stated therapy goals  -SC     Criteria for Skilled Interventions Met (PT) yes;meets criteria;skilled treatment is necessary  -SC     Therapy Frequency (PT) daily   -SC       Row Name 07/26/24 1156          Positioning and Restraints    Pre-Treatment Position in bed  -SC     Post Treatment Position chair  -SC     In Chair notified nsg;reclined;sitting;call light within reach;encouraged to call for assist;exit alarm on  -SC               User Key  (r) = Recorded By, (t) = Taken By, (c) = Cosigned By      Initials Name Provider Type    SC Bryan Parra, PT Physical Therapist                   Outcome Measures       Row Name 07/26/24 1158 07/26/24 0849       How much help from another person do you currently need...    Turning from your back to your side while in flat bed without using bedrails? 4  -SC 3  -BC (r) EA (t) BC (c)    Moving from lying on back to sitting on the side of a flat bed without bedrails? 3  -SC 3  -BC (r) EA (t) BC (c)    Moving to and from a bed to a chair (including a wheelchair)? 3  -SC 3  -BC (r) EA (t) BC (c)    Standing up from a chair using your arms (e.g., wheelchair, bedside chair)? 3  -SC 3  -BC (r) EA (t) BC (c)    Climbing 3-5 steps with a railing? 2  -SC 3  -BC (r) EA (t) BC (c)    To walk in hospital room? 3  -SC 3  -BC (r) EA (t) BC (c)    AM-PAC 6 Clicks Score (PT) 18  -SC 18  -BC (r) EA (t)    Highest Level of Mobility Goal 6 --> Walk 10 steps or more  -SC 6 --> Walk 10 steps or more  -BC (r) EA (t)      Row Name 07/26/24 1158          Functional Assessment    Outcome Measure Options AM-PAC 6 Clicks Basic Mobility (PT)  -SC               User Key  (r) = Recorded By, (t) = Taken By, (c) = Cosigned By      Initials Name Provider Type    SC Bryan Parra, PT Physical Therapist    Hillary Hernandez, RN Registered Nurse    Coco Palafox, PCT Technician                                 Physical Therapy Education       Title: PT OT SLP Therapies (In Progress)       Topic: Physical Therapy (Done)       Point: Mobility training (Done)       Learning Progress Summary             Patient MOSES Massey VU by SC at 7/26/2024 1158    Comment: reviewed  benefits of activity    Eager, E, VU by SC at 7/25/2024 1847    Comment: reviewed benefits of walking    Acceptance, E, VU by  at 7/23/2024 1632    Comment: Pt educated on safe mobility with visual scanning of environment with eye patch                         Point: Home exercise program (Done)       Learning Progress Summary             Patient Eager, E, VU by SC at 7/26/2024 1158    Comment: reviewed benefits of activity    Eager, E, VU by SC at 7/25/2024 1847    Comment: reviewed benefits of walking                         Point: Body mechanics (Done)       Learning Progress Summary             Patient Eager, E, VU by SC at 7/26/2024 1158    Comment: reviewed benefits of activity    Eager, E, VU by SC at 7/25/2024 1847    Comment: reviewed benefits of walking    Acceptance, E, VU by  at 7/23/2024 1632    Comment: Pt educated on safe mobility with visual scanning of environment with eye patch                         Point: Precautions (Done)       Learning Progress Summary             Patient Eager, E, VU by SC at 7/26/2024 1158    Comment: reviewed benefits of activity    Eager, E, VU by SC at 7/25/2024 1847    Comment: reviewed benefits of walking    Acceptance, E, VU by  at 7/23/2024 1632    Comment: Pt educated on safe mobility with visual scanning of environment with eye patch                                         User Key       Initials Effective Dates Name Provider Type Discipline    SC 02/03/23 -  Bryan Parra, PT Physical Therapist PT     05/07/24 -  Jackelyn Hilton, PT Student PT Student PT                  PT Recommendation and Plan     Plan of Care Reviewed With: patient  Progress: improving  Outcome Evaluation: Patient demonstrated improving mobility overall. She continues to be below her baseline and I continue to recommend SNF     Time Calculation:         PT Charges       Row Name 07/26/24 1045             Time Calculation    Start Time 1045  -SC      PT Received On 07/26/24  -SC       PT Goal Re-Cert Due Date 08/02/24  -SC         Timed Charges    85647 - PT Therapeutic Exercise Minutes 3  -SC      35770 - Gait Training Minutes  15  -SC      44514 - PT Therapeutic Activity Minutes 10  -SC         Total Minutes    Timed Charges Total Minutes 28  -SC       Total Minutes 28  -SC                User Key  (r) = Recorded By, (t) = Taken By, (c) = Cosigned By      Initials Name Provider Type    SC Fidel, Bryan CARROLL, PT Physical Therapist                  Therapy Charges for Today       Code Description Service Date Service Provider Modifiers Qty    36044421563 HC GAIT TRAINING EA 15 MIN 7/25/2024 Fidel, Bryan CARROLL, PT GP 1    87719063593 HC PT THERAPEUTIC ACT EA 15 MIN 7/25/2024 Fidel, Bryan CARROLL, PT GP 1    86841518280 HC GAIT TRAINING EA 15 MIN 7/26/2024 Fidel, Bryan CARROLL, PT GP 1    11769236228 HC PT THERAPEUTIC ACT EA 15 MIN 7/26/2024 Fidel, Bryan CARROLL, PT GP 1            PT G-Codes  Outcome Measure Options: AM-PAC 6 Clicks Basic Mobility (PT)  AM-PAC 6 Clicks Score (PT): 18  AM-PAC 6 Clicks Score (OT): 15  Modified Henry Scale: 3 - Moderate disability.  Requiring some help, but able to walk without assistance.  PT Discharge Summary  Anticipated Discharge Disposition (PT): skilled nursing facility    Bryan Parra PT  7/26/2024

## 2024-07-26 NOTE — DISCHARGE SUMMARY
[facility] Medicine Services  DISCHARGE SUMMARY    Patient Name: Sylvia Jalloh  : 1934  MRN: [id number]    Date of Admission: 2024  9:37 AM  Date of Discharge:  2024  Primary Care Physician: Bart Pop APRN    Consults       Date and Time Order Name Status Description    2024  9:59 AM Inpatient Neurology Consult Stroke Completed             Hospital Course     Presenting Problem: f/u stroke     Active Hospital Problems    Diagnosis  POA    **TIA (transient ischemic attack) [G45.9]  Yes    Non-ischemic cardiomyopathy [I42.8]  Yes    CKD (chronic kidney disease) stage 3, GFR 30-59 ml/min [N18.30]  Yes    Hypothyroidism [E03.9]  Yes      Resolved Hospital Problems   No resolved problems to display.          Hospital Course:  Sylvia Jalloh is a 89 y.o. female  with history of NICM/HFrEF, CKD III, HTN, hypothyroidism here with acute dizziness/double vision.. patient has acute Left thalamic and left frontal infarct and was followed by stroke neurology. She also had a UTI and received IV rocephin, also possible ted LL PNA and was treated by doxy and rocephin.      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  Double Vision  -initial CT imaging with R ICA plaque/stenosis, 2 mm saccular aneurysm  -CT perfusion without core infarction  -MRI with acute left thalamic infarct, and punctate white matter infarct in the high anterior left frontal lobe   -on DAPT/statin  -Echo with EF 51-55%  -No evidence here of Afib on telemetry monitoring, but arranged Holter monitoring at discharge  -PT/OT, applied eye patch to help w/double vision  -- follow up with stroke clinic 1 month     E.coli UTI  -s/p 5 days of rocephin       Possible Bilateral LL PNA  -CT with bilateral lower lung airspace disease with areas of mucous endobronchial plugging.  -s/p rocephin and two more doses of doxy to complete 5 days of therapy   -pulmonary toilet  -- stable on room air      History  of [facility]  History of HFrEF  Mild AoVStenosis  - Echo w/EF 51-55%, also noted mild aortic valve stenosis  -continue BB/entresto/diuretics  -previously prescribed Farxiga for HFrEF but instructed to stop, however family notes she may have continued taking it, hold at discharge. She lives alone.     CKD  -stable renal function, at baseline    Patient has remained clinically stable and will be discharged to rehab today       Discharge Follow Up Recommendations for outpatient labs/diagnostics:   Follow up with pcp after dc from rehab  Follow up with stroke clinic 1 month     Day of Discharge     HPI:   Patient is sitting up in bed in NAD with family at bedside. She states she is doing well. Double vision improved if eye is patched. No acute events overnight per nursing. Plan for rehab today     Review of Systems  Gen- No fevers, chills  CV- No chest pain, palpitations  Resp- No cough, dyspnea  GI- No N/V/D, abd pain      Vital Signs:   Temp:  [97.5 °F (36.4 °C)-98.2 °F (36.8 °C)] 98.2 °F (36.8 °C)  Heart Rate:  [69-79] 76  Resp:  [16-18] 18  BP: (118-146)/(52-74) 133/63      Physical Exam:  Constitutional: No acute distress, awake, alert  HENT: NCAT, mucous membranes moist, left eye with patch   Respiratory: Clear to auscultation bilaterally, respiratory effort normal room air 95%  Cardiovascular: RRR, no murmurs, rubs, or gallops  Gastrointestinal: Positive bowel sounds, soft, nontender, nondistended  Musculoskeletal: No bilateral ankle edema  Psychiatric: Appropriate affect, cooperative  Neurologic: Oriented x 3, strength symmetric in all extremities, Cranial Nerves grossly intact to confrontation, speech clear  Skin: No rashes      Pertinent  and/or Most Recent Results     LAB RESULTS:      Lab 07/23/24  0333 07/22/24  1017 07/22/24  1009 07/22/24  1008   WBC 14.23* 14.37*  --   --    HEMOGLOBIN 12.0 11.8*  --   --    HEMOGLOBIN, POC  --   --  11.6*  --    HEMATOCRIT 36.5 35.1  --   --    HEMATOCRIT POC  --   --  34*   --    PLATELETS 206 203  --   --    NEUTROS ABS 10.55* 10.76*  --   --    IMMATURE GRANS (ABS) 0.05 0.05  --   --    LYMPHS ABS 2.04 2.40  --   --    MONOS ABS 1.38* 0.99*  --   --    EOS ABS 0.13 0.09  --   --    MCV 98.4* 96.7  --   --    PROTIME  --   --   --  14.5   APTT  --  44.4*  --   --          Lab 07/23/24  0333 07/22/24  1018 07/22/24  1009   SODIUM 136 136  --    POTASSIUM 4.5 4.2  --    CHLORIDE 102 101  --    CO2 22.0 21.0*  --    ANION GAP 12.0 14.0  --    BUN 24* 22  --    CREATININE 1.55* 1.51* 1.60*   EGFR 31.9* 32.9* 30.7*   GLUCOSE 104* 109*  --    CALCIUM 8.7 8.9  --    HEMOGLOBIN A1C 5.00  --   --          Lab 07/23/24  0333 07/22/24  1018   TOTAL PROTEIN 6.7 7.2   ALBUMIN 3.5 3.8   GLOBULIN 3.2 3.4   ALT (SGPT) 9 9  9   AST (SGOT) 22 21  23   BILIRUBIN 0.8 0.8   ALK PHOS 72 77         Lab 07/22/24  1306 07/22/24  1018 07/22/24  1008   HSTROP T 59* 54*  --    PROTIME  --   --  14.5   INR  --   --  1.2         Lab 07/23/24  0333   CHOLESTEROL 118   LDL CHOL 47   HDL CHOL 53   TRIGLYCERIDES 98             Brief Urine Lab Results  (Last result in the past 365 days)        Color   Clarity   Blood   Leuk Est   Nitrite   Protein   CREAT   Urine HCG        07/22/24 1146 Yellow   Clear   Trace   Moderate (2+)   Negative   Negative                 Microbiology Results (last 10 days)       Procedure Component Value - Date/Time    Blood Culture - Blood, Arm, Right [[id number]]  (Normal) Collected: 07/22/24 1321    Lab Status: Preliminary result Specimen: Blood from Arm, Right Updated: 07/25/24 1345     Blood Culture No growth at 3 days    Narrative:      Less than seven (7) mL's of blood was collected.  Insufficient quantity may yield false negative results.    Blood Culture - Blood, Arm, Left [[id number]]  (Normal) Collected: 07/22/24 1321    Lab Status: Preliminary result Specimen: Blood from Arm, Left Updated: 07/25/24 1345     Blood Culture No growth at 3 days    Narrative:      Less than seven (7)  mL's of blood was collected.  Insufficient quantity may yield false negative results.    Urine Culture - Urine, Urine, Catheter [[id number]]  (Abnormal)  (Susceptibility) Collected: 07/22/24 1146    Lab Status: Final result Specimen: Urine, Catheter Updated: 07/24/24 1039     Urine Culture >100,000 CFU/mL Escherichia coli    Narrative:      Colonization of the urinary tract without infection is common. Treatment is discouraged unless the patient is symptomatic, pregnant, or undergoing an invasive urologic procedure.    Susceptibility        Escherichia coli      OLAF      Amoxicillin + Clavulanate Susceptible      Ampicillin Susceptible      Ampicillin + Sulbactam Susceptible      Cefazolin Susceptible      Cefepime Susceptible      Ceftazidime Susceptible      Ceftriaxone Susceptible      Gentamicin Susceptible      Levofloxacin Susceptible      Nitrofurantoin Susceptible      Piperacillin + Tazobactam Susceptible      Trimethoprim + Sulfamethoxazole Susceptible                                   EEG    Result Date: 7/24/2024  Reason for referral: 89 y.o.female with altered mental status Technical Summary:  A 19 channel digital EEG was performed using the international 10-20 placement system, including eye leads and EKG leads. Duration: 22 minutes Findings: The patient is resting quietly in bed.  The background shows diffuse low amplitude 5-8 Hz theta with intermixed faster alpha frequencies seen at times.  A posterior rhythm is not clearly seen.  Drowsiness and light sleep are seen with mild slowing of the background and vertex waves.  No focal features or epileptiform activity are seen.  Photic stimulation and hyperventilation are not performed. Video: Available Technical quality: Superior EKG: Regular, 70 bpm SUMMARY: Mild generalized slow No focal features or epileptiform activity are seen     Diffuse cerebral dysfunction of mild degree but nonspecific.  This is most commonly seen due to toxic/metabolic cause This  report is transcribed using the Dragon dictation system.      MRI Brain Without Contrast    Result Date: 7/22/2024  MRI BRAIN WO CONTRAST Date of Exam: 7/22/2024 8:35 PM EDT Indication: Stroke, follow up Acute stroke suspected.  Comparison: None available. Technique:  Routine multiplanar/multisequence sequence images of the brain were obtained without contrast administration. Findings: There is restricted diffusion in the anteromedial left thalamus. There is a punctate focus of restricted diffusion in the high anterior left frontal white matter. There is no suspicious susceptibility. The thalamic diffusion abnormality is associated with increased T2 signal. There are multifocal T2 signal hyperintensities in the supratentorial white matter not associated with restricted diffusion, compatible with chronic microvascular ischemic change. No cortical signal abnormality is demonstrated. Major intracranial flow voids are present. Cerebellar tonsils are in normal position. There is no abnormal extra-axial fluid collection.     Impression: 1. Acute left thalamic infarct, and punctate white matter infarct in the high anterior left frontal lobe 2. Chronic small vessel ischemic white matter changes Electronically Signed: Alvarez León  7/22/2024 9:33 PM EDT  Workstation ID: [id number]    Adult Transthoracic Echo Complete W/ Cont if Necessary Per Protocol (With Agitated Saline)    Result Date: 7/22/2024    Left ventricular systolic function is low normal. Left ventricular ejection fraction appears to be 51 - 55%.   Left ventricular wall thickness is consistent with borderline concentric hypertrophy.   Left ventricular diastolic function is consistent with (grade Ia w/high LAP) impaired relaxation.   Left atrial volume is severely increased.   Mild aortic valve stenosis is present. Aortic valve area is 1.1 cm2.   Peak velocity of the flow distal to the aortic valve is 270 cm/s. Aortic valve maximum pressure gradient is 29 mmHg.  Aortic valve mean pressure gradient is 16 mmHg.   Estimated right ventricular systolic pressure from tricuspid regurgitation is normal (<35 mmHg). Calculated right ventricular systolic pressure from tricuspid regurgitation is 26 mmHg.   There is a left pleural effusion. There is a right pleural effusion.   Mild to moderate mitral valve regurgitation EF slightly improved compared to previous study     CT Abdomen Pelvis Without Contrast    Result Date: 7/22/2024  CT ABDOMEN PELVIS WO CONTRAST Date of Exam: 7/22/2024 1:58 PM EDT Indication: back pain/UTI. Comparison: None available. Technique: Axial CT images were obtained of the abdomen and pelvis without the administration of contrast. Reconstructed coronal and sagittal images were also obtained. Automated exposure control and iterative construction methods were used. Findings: LOWER THORAX: There is bilateral lower lobe bronchial wall thickening with areas of irregular consolidation and mucous plugging. Correlate for signs of pulmonary infection either acute or chronic. There is a thin anterior pericardial effusion measuring up to 7 mm in thickness. There is heavy mitral annular calcification. LIVER:  Unremarkable parenchyma without focal lesion. BILIARY/GALLBLADDER: Cholecystectomy. SPLEEN:  Unremarkable PANCREAS:  Unremarkable ADRENAL: There is generalized left adrenal thickening. KIDNEYS:  Unremarkable parenchyma with no solid mass identified. There is contrast within the collecting system and ureters related to recent contrasted CT studies. No obstruction.  No calculus identified. GASTROINTESTINAL/MESENTERY: There is a moderate-sized sliding hiatal hernia no evidence of obstruction nor inflammation.  AORTA/IVC:  Normal caliber. RETROPERITONEUM/LYMPH NODES:  Unremarkable REPRODUCTIVE: Hysterectomy. There is contrast within the vagina presumably refluxed during micturition although vesicovaginal fistula not excluded. Correlate with symptoms. BLADDER:  Unremarkable  OSSEUS STRUCTURES: Spinal degenerative changes without acute osseous process identified.     Impression: 1.Bilateral lower lung airspace disease with areas of mucous endobronchial plugging. Correlate for signs of acute or chronic infection/pneumonia. 2.There is contrast within the vagina either refluxed during micturition or result of vesicovaginal fistula. Correlate with symptoms. 3.Other incidental nonemergent findings as detailed above. Electronically Signed: Julian Bishop MD  7/22/2024 2:20 PM EDT  Workstation ID: [id number]    XR Chest 1 View    Result Date: 7/22/2024  XR CHEST 1 VW Date of Exam: 7/22/2024 10:05 AM EDT Indication: Acute Stroke Protocol (onset < 12 hrs) Comparison: 7/7/2020 Findings: Heart size is within normal limits. Pulmonary vascular markings appear normal. There is a right-sided pleural effusion and right basilar atelectasis or airspace disease in the right costophrenic sulcus. There is airspace disease in the left base in the retrocardiac region.     Impression: 1. Small right pleural effusion with patchy bibasilar airspace disease. Electronically Signed: Domingo Mills MD  7/22/2024 10:58 AM EDT  Workstation ID: [id number]    CT Angiogram Head w AI Analysis of LVO    Result Date: 7/22/2024  CT ANGIOGRAM HEAD W AI ANALYSIS OF LVO, CT ANGIOGRAM NECK, CT CEREBRAL PERFUSION W WO CONTRAST Date of Exam: 7/22/2024 10:10 AM EDT Indication: Neuro Deficit, acute, Stroke suspected Neuro deficit, acute stroke suspected. Comparison: Noncontrast head CT from today Technique: CTA of the head was performed after the uneventful intravenous administration of 125 mL Isovue-370. Reconstructed coronal and sagittal images were also obtained. In addition, a 3-D volume rendered image was created for interpretation. Automated exposure control and iterative reconstruction methods were used. FINDINGS: Vascular Findings: Atherosclerotic plaque within the right carotid bifurcation and right carotid siphon. Focal plaque  within the right proximal ICA with approximately 40 to 50% focal stenosis 1.5 cm beyond its origin. The right common carotid, remainder of the right ICA, middle cerebral, anterior cerebral, vertebral, and posterior cerebral arteries are patent without abrupt cut off. 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 There is suggestion of a 2 mm saccular aneurysm seen about the junction of the right supraclinoid ICA/P-comm (series 5, images 237-238). Atherosclerotic plaque within the left carotid bifurcation and left carotid siphon. The left common carotid, internal carotid, middle cerebral, anterior cerebral, vertebral, and posterior cerebral arteries are patent without abrupt cut off. Basilar artery appears patent and appears unremarkable. Non-vascular Findings: For description of nonvascular intracranial findings, please refer to the noncontrast head CT performed the same date. No acute abnormality is identified within the visualized soft tissue or bony structures of the neck. The visualized lung apices are clear. CT Perfusion: CBF (<30%) volume: 0 mL Tmax (>6.0s) volume: 0 mL Mismatch volume: 0 mL Mismatch ratio: None      1.No acute abnormality identified within the large arteries of the head or neck. 2.Focal plaque within the right proximal ICA with approximately 40 to 50% focal stenosis 1.5 cm beyond its origin. 3.No significant stenosis of the left internal carotid artery. 4.There is suggestion of a 2 mm saccular aneurysm seen about the junction of the right supraclinoid ICA/P-comm (series 5, images 237-238). 5.CT perfusion study demonstrates no territorial ischemia or core infarct. Electronically Signed: Swapnil Mcfarland MD  7/22/2024 10:50 AM EDT  Workstation ID: [id number]    CT Angiogram Neck    Result Date: 7/22/2024  CT ANGIOGRAM HEAD W AI ANALYSIS OF LVO, CT ANGIOGRAM NECK, CT CEREBRAL PERFUSION W WO CONTRAST Date of Exam: 7/22/2024 10:10 AM EDT Indication: Neuro  Deficit, acute, Stroke suspected Neuro deficit, acute stroke suspected. Comparison: Noncontrast head CT from today Technique: CTA of the head was performed after the uneventful intravenous administration of 125 mL Isovue-370. Reconstructed coronal and sagittal images were also obtained. In addition, a 3-D volume rendered image was created for interpretation. Automated exposure control and iterative reconstruction methods were used. FINDINGS: Vascular Findings: Atherosclerotic plaque within the right carotid bifurcation and right carotid siphon. Focal plaque within the right proximal ICA with approximately 40 to 50% focal stenosis 1.5 cm beyond its origin. The right common carotid, remainder of the right ICA, middle cerebral, anterior cerebral, vertebral, and posterior cerebral arteries are patent without abrupt cut off. 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 There is suggestion of a 2 mm saccular aneurysm seen about the junction of the right supraclinoid ICA/P-comm (series 5, images 237-238). Atherosclerotic plaque within the left carotid bifurcation and left carotid siphon. The left common carotid, internal carotid, middle cerebral, anterior cerebral, vertebral, and posterior cerebral arteries are patent without abrupt cut off. Basilar artery appears patent and appears unremarkable. Non-vascular Findings: For description of nonvascular intracranial findings, please refer to the noncontrast head CT performed the same date. No acute abnormality is identified within the visualized soft tissue or bony structures of the neck. The visualized lung apices are clear. CT Perfusion: CBF (<30%) volume: 0 mL Tmax (>6.0s) volume: 0 mL Mismatch volume: 0 mL Mismatch ratio: None      1.No acute abnormality identified within the large arteries of the head or neck. 2.Focal plaque within the right proximal ICA with approximately 40 to 50% focal stenosis 1.5 cm beyond its origin. 3.No  significant stenosis of the left internal carotid artery. 4.There is suggestion of a 2 mm saccular aneurysm seen about the junction of the right supraclinoid ICA/P-comm (series 5, images 237-238). 5.CT perfusion study demonstrates no territorial ischemia or core infarct. Electronically Signed: Swapnil Mcfarland MD  7/22/2024 10:50 AM EDT  Workstation ID: [id number]    CT CEREBRAL PERFUSION WITH & WITHOUT CONTRAST    Result Date: 7/22/2024  CT ANGIOGRAM HEAD W AI ANALYSIS OF LVO, CT ANGIOGRAM NECK, CT CEREBRAL PERFUSION W WO CONTRAST Date of Exam: 7/22/2024 10:10 AM EDT Indication: Neuro Deficit, acute, Stroke suspected Neuro deficit, acute stroke suspected. Comparison: Noncontrast head CT from today Technique: CTA of the head was performed after the uneventful intravenous administration of 125 mL Isovue-370. Reconstructed coronal and sagittal images were also obtained. In addition, a 3-D volume rendered image was created for interpretation. Automated exposure control and iterative reconstruction methods were used. FINDINGS: Vascular Findings: Atherosclerotic plaque within the right carotid bifurcation and right carotid siphon. Focal plaque within the right proximal ICA with approximately 40 to 50% focal stenosis 1.5 cm beyond its origin. The right common carotid, remainder of the right ICA, middle cerebral, anterior cerebral, vertebral, and posterior cerebral arteries are patent without abrupt cut off. 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 There is suggestion of a 2 mm saccular aneurysm seen about the junction of the right supraclinoid ICA/P-comm (series 5, images 237-238). Atherosclerotic plaque within the left carotid bifurcation and left carotid siphon. The left common carotid, internal carotid, middle cerebral, anterior cerebral, vertebral, and posterior cerebral arteries are patent without abrupt cut off. Basilar artery appears patent and appears unremarkable.  Non-vascular Findings: For description of nonvascular intracranial findings, please refer to the noncontrast head CT performed the same date. No acute abnormality is identified within the visualized soft tissue or bony structures of the neck. The visualized lung apices are clear. CT Perfusion: CBF (<30%) volume: 0 mL Tmax (>6.0s) volume: 0 mL Mismatch volume: 0 mL Mismatch ratio: None      1.No acute abnormality identified within the large arteries of the head or neck. 2.Focal plaque within the right proximal ICA with approximately 40 to 50% focal stenosis 1.5 cm beyond its origin. 3.No significant stenosis of the left internal carotid artery. 4.There is suggestion of a 2 mm saccular aneurysm seen about the junction of the right supraclinoid ICA/P-comm (series 5, images 237-238). 5.CT perfusion study demonstrates no territorial ischemia or core infarct. Electronically Signed: Swapnil Mcfarland MD  7/22/2024 10:50 AM EDT  Workstation ID: [id number]    CT Head Without Contrast Stroke Protocol    Result Date: 7/22/2024  CT HEAD WO CONTRAST STROKE PROTOCOL Date of Exam: 7/22/2024 10:00 AM EDT Indication: Neuro deficit, acute, stroke suspected Neuro Deficit, acute, Stroke suspected. Comparison: None available. Technique: Axial CT images were obtained of the head without contrast administration.  Reconstructed coronal images were also obtained. Automated exposure control and iterative construction methods were used. Scan Time: 10:05 a.m. Study reviewed by the stroke team at time of scan. Report dictated and signed at 10:38 a.m., 7/22/2024. Findings: The calvarium appears intact. Included paranasal sinuses and mastoids appear clear. There is age-appropriate generalized cerebral atrophy. No hemorrhage, contusion or edema is seen. There is no evidence of acute infarction, mass or mass effect, hydrocephalus, or abnormal extra-axial collection. There is symmetric chronic central white matter change, typical of microvascular  leukoencephalopathy.     Impression: Chronic appearing changes of the aging brain. No evidence of acute intracranial disease. Electronically Signed: Arpit Trevino MD  7/22/2024 10:38 AM EDT  Workstation ID: [id number]     Results for orders placed during the hospital encounter of 09/21/17    Duplex Renal Artery - Bilateral Complete CAR    Interpretation Summary  EXAMINATION: BILATERAL DUPLEX RENAL ULTRASOUND - 09/23/2017    HISTORY: Hypertension.    FINDINGS: Kidneys are within normal limits in size, both approximately 9  cm. Images obtained show grossly normal-appearing renal cortex and no  hydronephrosis. Peak systolic renal artery velocities are within normal  limits bilaterally, 150 cm/s or less for the left renal artery and  maximum of 193 cm/s for the mid right renal artery. Renal arteries are  noted to be tortuous, not unusual for patient's age. Resistive index for  the right kidney is upper normal, 0.74, but clearly elevated for the  left kidney at 0.83 indicating some renal parenchymal disease may be  present. Renal aortic ratios are calculated as 2.5 on the right and 2.2  on the left, not significantly increased. Renal veins appear patent.  Incidental note is made of elevated peak systolic velocities in the  celiac axis of 335 cm/s.    Impression  1. No evidence of hemodynamically significant renal artery stenosis.  2. Elevated resistive indices of the left kidney suggesting some  parenchymal disease may be present.    DICTATED:     09/24/2017  EDITED:         09/24/2017    This report was finalized on 9/25/2017 9:38 AM by DR. Arpit Trevino MD.      Results for orders placed during the hospital encounter of 09/21/17    Duplex Renal Artery - Bilateral Complete CAR    Interpretation Summary  EXAMINATION: BILATERAL DUPLEX RENAL ULTRASOUND - 09/23/2017    HISTORY: Hypertension.    FINDINGS: Kidneys are within normal limits in size, both approximately 9  cm. Images obtained show grossly normal-appearing renal cortex  and no  hydronephrosis. Peak systolic renal artery velocities are within normal  limits bilaterally, 150 cm/s or less for the left renal artery and  maximum of 193 cm/s for the mid right renal artery. Renal arteries are  noted to be tortuous, not unusual for patient's age. Resistive index for  the right kidney is upper normal, 0.74, but clearly elevated for the  left kidney at 0.83 indicating some renal parenchymal disease may be  present. Renal aortic ratios are calculated as 2.5 on the right and 2.2  on the left, not significantly increased. Renal veins appear patent.  Incidental note is made of elevated peak systolic velocities in the  celiac axis of 335 cm/s.    Impression  1. No evidence of hemodynamically significant renal artery stenosis.  2. Elevated resistive indices of the left kidney suggesting some  parenchymal disease may be present.    DICTATED:     09/24/2017  EDITED:         09/24/2017    This report was finalized on 9/25/2017 9:38 AM by DR. Arpit Trevino MD.      Results for orders placed during the hospital encounter of 07/22/24    Adult Transthoracic Echo Complete W/ Cont if Necessary Per Protocol (With Agitated Saline)    Interpretation Summary    Left ventricular systolic function is low normal. Left ventricular ejection fraction appears to be 51 - 55%.    Left ventricular wall thickness is consistent with borderline concentric hypertrophy.    Left ventricular diastolic function is consistent with (grade Ia w/high LAP) impaired relaxation.    Left atrial volume is severely increased.    Mild aortic valve stenosis is present. Aortic valve area is 1.1 cm2.    Peak velocity of the flow distal to the aortic valve is 270 cm/s. Aortic valve maximum pressure gradient is 29 mmHg. Aortic valve mean pressure gradient is 16 mmHg.    Estimated right ventricular systolic pressure from tricuspid regurgitation is normal (<35 mmHg). Calculated right ventricular systolic pressure from tricuspid regurgitation is 26  mmHg.    There is a left pleural effusion. There is a right pleural effusion.    Mild to moderate mitral valve regurgitation    EF slightly improved compared to previous study      Plan for Follow-up of Pending Labs/Results:   Pending Labs       Order Current Status    Blood Culture - Blood, Arm, Left Preliminary result    Blood Culture - Blood, Arm, Right Preliminary result          Discharge Details        Discharge Medications        New Medications        Instructions Start Date   aspirin 81 MG chewable tablet   81 mg, Oral, Daily   Start Date: July 27, 2024     atorvastatin 80 MG tablet  Commonly known as: LIPITOR   80 mg, Oral, Nightly      clopidogrel 75 MG tablet  Commonly known as: PLAVIX   75 mg, Oral, Daily   Start Date: July 27, 2024     doxycycline 100 MG capsule  Commonly known as: MONODOX   100 mg, Oral, Every 12 Hours Scheduled      guaiFENesin 600 MG 12 hr tablet  Commonly known as: MUCINEX   600 mg, Oral, Every 12 Hours Scheduled             Changes to Medications        Instructions Start Date   amLODIPine 2.5 MG tablet  Commonly known as: NORVASC  What changed:   medication strength  when to take this   2.5 mg, Oral, Daily, Per patient, take two tabs daily             Continue These Medications        Instructions Start Date   carvedilol 12.5 MG tablet  Commonly known as: COREG   12.5 mg, Oral, 2 Times Daily With Meals      cetirizine 10 MG tablet  Commonly known as: zyrTEC   10 mg, Oral, Daily      Entresto 49-51 MG tablet  Generic drug: sacubitril-valsartan   1 tablet, Oral      estradiol 1 MG tablet  Commonly known as: ESTRACE   1 mg, Oral, Daily, Taking 1/2 tablet qd      furosemide 20 MG tablet  Commonly known as: LASIX   20 mg, Oral, Daily      hydrOXYzine 25 MG tablet  Commonly known as: ATARAX   25 mg, Oral, 3 Times Daily PRN      levothyroxine 75 MCG tablet  Commonly known as: SYNTHROID, LEVOTHROID   75 mcg, Oral, Daily      meclizine 25 MG tablet  Commonly known as: ANTIVERT   25 mg,  Oral, Every 6 Hours PRN      Multivitamin Adults 50+ tablet tablet  Generic drug: multivitamin with minerals   1 tablet, Oral, Daily      ondansetron ODT 4 MG disintegrating tablet  Commonly known as: ZOFRAN-ODT   4 mg, Oral, Every 8 Hours PRN      spironolactone 25 MG tablet  Commonly known as: ALDACTONE   25 mg, Oral, Daily      Vitamin D (Cholecalciferol) 25 MCG (1000 UT) capsule   1,000 Units, Oral, Daily               Allergies   Allergen Reactions    Ace Inhibitors Rash    Latex Rash         Discharge Disposition:  Rehab Facility or Unit (DC - External)    Diet:  Hospital:  Diet Order   Procedures    Diet: Regular/House; Fluid Consistency: Thin (IDDSI 0)       Diet Instructions       Diet: Regular/House Diet; Regular (IDDSI 7); Thin (IDDSI 0)      Discharge Diet: Regular/House Diet    Texture: Regular (IDDSI 7)    Fluid Consistency: Thin (IDDSI 0)             Activity:  Activity Instructions       Activity as Tolerated      Measure Blood Pressure              Restrictions or Other Recommendations:         CODE STATUS:    Code Status and Medical Interventions:   Ordered at: 07/22/24 1302     Code Status (Patient has no pulse and is not breathing):    CPR (Attempt to Resuscitate)     Medical Interventions (Patient has pulse or is breathing):    Full Support       No future appointments.    Additional Instructions for the Follow-ups that You Need to Schedule       Discharge Follow-up with PCP   As directed       Currently Documented PCP:    Bart Pop APRN    PCP Phone Number:    [phone]     Follow Up Details: follow up wt pcp after dc from rehab        Discharge Follow-up with Specified Provider: Follow up with stroke clinic 1 month   As directed      To: Follow up with stroke clinic 1 month                      LOREN Phillips  07/26/24      Time Spent on Discharge:  I spent  45  minutes on this discharge activity which included: face-to-face encounter with the patient, reviewing  the data in the system, coordination of the care with the nursing staff as well as consultants, documentation, and entering orders.

## 2024-07-26 NOTE — PLAN OF CARE
Goal Outcome Evaluation:  Plan of Care Reviewed With: patient           Outcome Evaluation: D/C to facility

## 2024-07-26 NOTE — PLAN OF CARE
Problem: Adult Inpatient Plan of Care  Goal: Plan of Care Review  Outcome: Ongoing, Progressing  Goal: Patient-Specific Goal (Individualized)  Outcome: Ongoing, Progressing  Goal: Absence of Hospital-Acquired Illness or Injury  Outcome: Ongoing, Progressing  Intervention: Identify and Manage Fall Risk  Description: Perform standard risk assessment on admission using a validated tool or comprehensive approach appropriate to the patient; reassess fall risk frequently, with change in status or transfer to another level of care.  Communicate fall injury risk to interprofessional healthcare team.  Determine need for increased observation, equipment and environmental modification, such as low bed, signage and supportive, nonskid footwear.  Adjust safety measures to individual developmental age, stage and identified risk factors.  Reinforce the importance of safety and physical activity with patient and family.  Perform regular intentional rounding to assess need for position change, pain assessment and personal needs, including assistance with toileting.  Recent Flowsheet Documentation  Taken 7/26/2024 0400 by Stephanie Moya RN  Safety Promotion/Fall Prevention:   activity supervised   assistive device/personal items within reach   fall prevention program maintained   lighting adjusted   mobility aid in reach   gait belt   nonskid shoes/slippers when out of bed   room organization consistent   safety round/check completed   toileting scheduled  Taken 7/26/2024 0200 by Stephanie Moya RN  Safety Promotion/Fall Prevention:   activity supervised   assistive device/personal items within reach   fall prevention program maintained   lighting adjusted   mobility aid in reach   gait belt   nonskid shoes/slippers when out of bed   room organization consistent   safety round/check completed   toileting scheduled  Taken 7/26/2024 0000 by Stephanie Moya RN  Safety Promotion/Fall Prevention:   activity supervised    assistive device/personal items within reach   fall prevention program maintained   lighting adjusted   mobility aid in reach   gait belt   nonskid shoes/slippers when out of bed   room organization consistent   safety round/check completed   toileting scheduled  Taken 7/25/2024 2220 by Stephanie Moya RN  Safety Promotion/Fall Prevention:   activity supervised   assistive device/personal items within reach   fall prevention program maintained   lighting adjusted   mobility aid in reach   gait belt   nonskid shoes/slippers when out of bed   room organization consistent   safety round/check completed   toileting scheduled  Taken 7/25/2024 2000 by Stephanie Moya RN  Safety Promotion/Fall Prevention:   activity supervised   assistive device/personal items within [facility]   mobility aid in reach   lighting adjusted   fall prevention program maintained   nonskid shoes/slippers when out of bed   room organization consistent   safety round/check completed   toileting scheduled  Taken 7/25/2024 1957 by Stephanie Moya RN  Safety Promotion/Fall Prevention:   activity supervised   assistive device/personal items within reach   fall prevention program maintained   gait belt   lighting adjusted   room organization consistent   safety round/check completed  Intervention: Prevent Skin Injury  Description: Perform a screening for skin injury risk, such as pressure or moisture associated skin damage on admission and at regular intervals throughout hospital stay.  Keep all areas of skin (especially folds) clean and dry.  Maintain adequate skin hydration.  Relieve and redistribute pressure and protect bony prominences; implement measures based on patient-specific risk factors.  Match turning and repositioning schedule to clinical condition.  Encourage weight shift frequently; assist with reposition if unable to complete independently.  Float heels off bed; avoid pressure on the Achilles tendon.  Keep skin free from extended  contact with medical devices.  Encourage functional activity and mobility, as early as tolerated.  Use aids (e.g., slide boards, mechanical lift) during transfer.  Recent Flowsheet Documentation  Taken 7/26/2024 0400 by Stephanie Moya RN  Skin Protection:   adhesive use limited   skin-to-device areas padded   skin-to-skin areas padded   transparent dressing maintained   tubing/devices free from skin contact  Taken 7/26/2024 0200 by Stephanie Moya RN  Skin Protection:   adhesive use limited   skin-to-device areas padded   skin-to-skin areas padded   transparent dressing maintained   tubing/devices free from skin contact  Taken 7/26/2024 0000 by Stephanie Moya RN  Skin Protection:   adhesive use limited   skin-to-device areas padded   skin-to-skin areas padded   transparent dressing maintained   tubing/devices free from skin contact  Taken 7/25/2024 2220 by Stephanie Moya RN  Skin Protection:   adhesive use limited   skin-to-device areas padded   skin-to-skin areas padded   transparent dressing maintained   tubing/devices free from skin contact  Taken 7/25/2024 2000 by Stephanie Moya RN  Skin Protection:   adhesive use limited   skin-to-device areas padded   skin-to-skin areas padded   transparent dressing maintained   tubing/devices free from skin contact  Taken 7/25/2024 1957 by Stephanie Moya RN  Skin Protection:   adhesive use limited   incontinence pads utilized   pulse oximeter probe site changed   silicone foam dressing in place   tubing/devices free from skin contact   transparent dressing maintained  Intervention: Prevent and Manage VTE (Venous Thromboembolism) Risk  Description: Assess for VTE (venous thromboembolism) risk.  Encourage and assist with early ambulation.  Initiate and maintain compression or other therapy, as indicated, based on identified risk in accordance with organizational protocol and provider order.  Encourage both active and passive leg exercises while in  bed, if unable to ambulate.  Recent Flowsheet Documentation  Taken 7/26/2024 0400 by Stephanie Moya RN  Activity Management: activity encouraged  Taken 7/26/2024 0200 by Stephanie Moya RN  Activity Management: activity encouraged  Taken 7/26/2024 0000 by Stephanie Moya RN  Activity Management: activity encouraged  Taken 7/25/2024 2220 by Stephanie Moya RN  Activity Management: activity encouraged  Taken 7/25/2024 2000 by Stephanie Moya RN  Activity Management: activity encouraged  VTE Prevention/Management: (subQ heparin) other (see comments)  Taken 7/25/2024 1957 by Stephanie Moya RN  Activity Management: activity encouraged  VTE Prevention/Management: (subQ heparin) other (see comments)  Range of Motion: active ROM (range of motion) encouraged  Intervention: Prevent Infection  Description: Maintain skin and mucous membrane integrity; promote hand, oral and pulmonary hygiene.  Optimize fluid balance, nutrition, sleep and glycemic control to maximize infection resistance.  Identify potential sources of infection early to prevent or mitigate progression of infection (e.g., wound, lines, devices).  Evaluate ongoing need for invasive devices; remove promptly when no longer indicated.  Recent Flowsheet Documentation  Taken 7/26/2024 0400 by Stephanie Moya RN  Infection Prevention:   single patient room provided   environmental surveillance performed  Taken 7/26/2024 0200 by Stephanie Moya RN  Infection Prevention:   single patient room provided   environmental surveillance performed  Taken 7/26/2024 0000 by Stephanie Moya RN  Infection Prevention:   single patient room provided   environmental surveillance performed  Taken 7/25/2024 2220 by Stephanie Moya RN  Infection Prevention:   single patient room provided   environmental surveillance performed  Taken 7/25/2024 2000 by Stephanie Moya RN  Infection Prevention:   environmental surveillance performed   single  patient room provided  Taken 7/25/2024 1957 by Stephanie Moya RN  Infection Prevention: environmental surveillance performed  Goal: Optimal Comfort and Wellbeing  Outcome: Ongoing, Progressing  Intervention: Monitor Pain and Promote Comfort  Description: Assess pain level, treatment efficacy and patient response at regular intervals using a consistent pain scale.  Consider the presence and impact of preexisting chronic pain.  Encourage patient and caregiver involvement in pain assessment, interventions and safety measures.  Recent Flowsheet Documentation  Taken 7/25/2024 1957 by Stephanie Moya RN  Pain Management Interventions:   see MAR   pillow support provided   position adjusted  Intervention: Provide Person-Centered Care  Description: Use a family-focused approach to care.  Develop trust and rapport by proactively providing information, encouraging questions, addressing concerns and offering reassurance.  Acknowledge emotional response to hospitalization.  Recognize and utilize personal coping strategies.  Honor spiritual and cultural preferences.  Recent Flowsheet Documentation  Taken 7/25/2024 1957 by Stephanie Moya RN  Trust Relationship/Rapport:   care explained   choices provided   thoughts/feelings acknowledged  Goal: Readiness for Transition of Care  Outcome: Ongoing, Progressing     Problem: Skin Injury Risk Increased  Goal: Skin Health and Integrity  Outcome: Ongoing, Progressing  Intervention: Optimize Skin Protection  Description: Perform a full pressure injury risk assessment, as indicated by screening, upon admission to care unit.  Reassess skin (injury risk, full inspection) frequently (e.g., scheduled interval, with change in condition) to provide optimal early detection and prevention.  Maintain adequate tissue perfusion (e.g., encourage fluid balance; avoid crossing legs, constrictive clothing or devices) to promote tissue oxygenation.  Maintain head of bed at lowest degree of  elevation tolerated, considering medical condition and other restrictions.  Avoid positioning onto an area that remains reddened.  Minimize incontinence and moisture (e.g., toileting schedule; moisture-wicking pad, diaper or incontinence collection device; skin moisture barrier).  Cleanse skin promptly and gently when soiled utilizing a pH-balanced cleanser.  Relieve and redistribute pressure (e.g., scheduled position changes, weight shifts, use of support surface, medical device repositioning, protective dressing application, use of positioning device, microclimate control, use of pressure-injury-monitor  Encourage increased activity, such as sitting in a chair at the bedside or early mobilization, when able to tolerate.  Recent Flowsheet Documentation  Taken 7/26/2024 0400 by Stephanie Moya RN  Pressure Reduction Techniques:   weight shift assistance provided   heels elevated off bed  Head of Bed ([facility]) Positioning: [facility] elevated  Pressure Reduction Devices: pressure-redistributing mattress utilized  Skin Protection:   adhesive use limited   skin-to-device areas padded   skin-to-skin areas padded   transparent dressing maintained   tubing/devices free from skin contact  Taken 7/26/2024 0200 by Stephanie Moya RN  Pressure Reduction Techniques:   weight shift assistance provided   heels elevated off bed  Head of Bed ([facility]) Positioning: [facility] elevated  Pressure Reduction Devices: pressure-redistributing mattress utilized  Skin Protection:   adhesive use limited   skin-to-device areas padded   skin-to-skin areas padded   transparent dressing maintained   tubing/devices free from skin contact  Taken 7/26/2024 0000 by Stephanie Moya RN  Pressure Reduction Techniques:   weight shift assistance provided   heels elevated off bed  Head of Bed ([facility]) Positioning: [facility] elevated  Pressure Reduction Devices: pressure-redistributing mattress utilized  Skin Protection:   adhesive use limited   skin-to-device areas  padded   skin-to-skin areas padded   transparent dressing maintained   tubing/devices free from skin contact  Taken 7/25/2024 2220 by Stephanie Moya RN  Pressure Reduction Techniques:   weight shift assistance provided   heels elevated off bed  Head of Bed ([facility]) Positioning: [facility] elevated  Pressure Reduction Devices: pressure-redistributing mattress utilized  Skin Protection:   adhesive use limited   skin-to-device areas padded   skin-to-skin areas padded   transparent dressing maintained   tubing/devices free from skin contact  Taken 7/25/2024 2000 by Stephanie Moya RN  Pressure Reduction Techniques:   weight shift assistance provided   heels elevated off bed  Head of Bed ([facility]) Positioning: [facility] elevated  Pressure Reduction Devices: pressure-redistributing mattress utilized  Skin Protection:   adhesive use limited   skin-to-device areas padded   skin-to-skin areas padded   transparent dressing maintained   tubing/devices free from skin contact  Taken 7/25/2024 1957 by Stephanie Moya RN  Pressure Reduction Techniques: weight shift assistance provided  Head of Bed ([facility]) Positioning: [facility] elevated  Pressure Reduction Devices: pressure-redistributing mattress utilized  Skin Protection:   adhesive use limited   incontinence pads utilized   pulse oximeter probe site changed   silicone foam dressing in place   tubing/devices free from skin contact   transparent dressing maintained     Problem: Fall Injury Risk  Goal: Absence of Fall and Fall-Related Injury  Outcome: Ongoing, Progressing  Intervention: Identify and Manage Contributors  Description: Develop a fall prevention plan with the patient and caregiver/family.  Provide reorientation, appropriate sensory stimulation and routines with changes in mental status to decrease risk of fall.  Promote use of personal vision and auditory aids.  Assess assistance level required for safe and effective self-care; provide support as needed, such as toileting,  mobilization. For age 65 and older, implement timed toileting with assistance.  Encourage physical activity, such as performance of mobility and self-care at highest level of patient ability, multicomponent exercise program and provision of appropriate assistive devices.  If fall occurs, assess the severity of injury; implement fall injury protocol. Determine the cause and revise fall injury prevention plan.  Regularly review medication contribution to fall risk; adjust medication administration times to minimize risk of falling.  Consider risk related to polypharmacy and age.  Balance adequate pain management with potential for oversedation.  Recent Flowsheet Documentation  Taken 7/26/2024 0400 by Stephanie Moya RN  Medication Review/Management: medications reviewed  Taken 7/26/2024 0200 by Stephanie Moya RN  Medication Review/Management: medications reviewed  Taken 7/26/2024 0000 by Stephanie Moya RN  Medication Review/Management: medications reviewed  Taken 7/25/2024 2220 by Stephanie Moya RN  Medication Review/Management: medications reviewed  Taken 7/25/2024 2000 by Stephanie Moya RN  Medication Review/Management: medications reviewed  Taken 7/25/2024 1957 by Stephanie Moya RN  Medication Review/Management: medications reviewed  Intervention: Promote Injury-Free Environment  Description: Provide a safe, barrier-free environment that encourages independent activity.  Keep care area uncluttered and well-lighted.  Determine need for increased observation or monitoring.  Avoid use of devices that minimize mobility, such as restraints or indwelling urinary catheter.  Recent Flowsheet Documentation  Taken 7/26/2024 0400 by Stephanie Moya RN  Safety Promotion/Fall Prevention:   activity supervised   assistive device/personal items within reach   fall prevention program maintained   lighting adjusted   mobility aid in reach   gait belt   nonskid shoes/slippers when out of bed   room  organization consistent   safety round/check completed   toileting scheduled  Taken 7/26/2024 0200 by Stephanie Moya RN  Safety Promotion/Fall Prevention:   activity supervised   assistive device/personal items within reach   fall prevention program maintained   lighting adjusted   mobility aid in reach   gait belt   nonskid shoes/slippers when out of bed   room organization consistent   safety round/check completed   toileting scheduled  Taken 7/26/2024 0000 by Stephanie Moya RN  Safety Promotion/Fall Prevention:   activity supervised   assistive device/personal items within reach   fall prevention program maintained   lighting adjusted   mobility aid in reach   gait belt   nonskid shoes/slippers when out of bed   room organization consistent   safety round/check completed   toileting scheduled  Taken 7/25/2024 2220 by Stephanie Moya RN  Safety Promotion/Fall Prevention:   activity supervised   assistive device/personal items within reach   fall prevention program maintained   lighting adjusted   mobility aid in reach   gait belt   nonskid shoes/slippers when out of bed   room organization consistent   safety round/check completed   toileting scheduled  Taken 7/25/2024 2000 by Stephanie Moya RN  Safety Promotion/Fall Prevention:   activity supervised   assistive device/personal items within [facility]   mobility aid in reach   lighting adjusted   fall prevention program maintained   nonskid shoes/slippers when out of bed   room organization consistent   safety round/check completed   toileting scheduled  Taken 7/25/2024 1957 by Stephanie Moya RN  Safety Promotion/Fall Prevention:   activity supervised   assistive device/personal items within [facility]   fall prevention program maintained   gait belt   lighting adjusted   room organization consistent   safety round/check completed     Problem: Heart Failure Comorbidity  Goal: Maintenance of Heart Failure Symptom Control  Outcome: Ongoing,  Progressing  Intervention: Maintain Heart Failure-Management  Description: Evaluate adherence to home heart failure self-care regimen (e.g., medication, fluid balance, sodium intake, daily weight, physical activity, telemonitoring, support).  Advocate continuation of home medication and schedule.  Consider pharmacologic therapy administration time and effects (e.g., avoid giving diuretic prior to bedtime or nitrates on empty stomach).  Monitor response to pharmacologic therapy, including weight fluctuations, blood pressure and electrolyte levels.  Monitor for signs and symptoms of anxiety and depression, including severity and duration; if present, provide psychosocial support.  Consider need for heart failure clinic or palliative care consult.  Recent Flowsheet Documentation  Taken 7/26/2024 0400 by Stephanie Moya RN  Medication Review/Management: medications reviewed  Taken 7/26/2024 0200 by Stephanie Moya RN  Medication Review/Management: medications reviewed  Taken 7/26/2024 0000 by Stephanie Moya RN  Medication Review/Management: medications reviewed  Taken 7/25/2024 2220 by Stephanie Moya RN  Medication Review/Management: medications reviewed  Taken 7/25/2024 2000 by Stephanie Moya RN  Medication Review/Management: medications reviewed  Taken 7/25/2024 1957 by Stephanie Moya RN  Medication Review/Management: medications reviewed     Problem: Hypertension Comorbidity  Goal: Blood Pressure in Desired Range  Outcome: Ongoing, Progressing  Intervention: Maintain Blood Pressure Management  Description: Evaluate adherence to home antihypertensive regimen (e.g., exercise and activity, diet modification, medication).  Provide scheduled antihypertensive medication; consider administration time and effects (e.g., avoid giving diuretic prior to bedtime).  Monitor response to antihypertensive medication therapy (e.g., blood pressure, electrolyte levels, medication effects).  Minimize risk of  orthostatic hypotension; encourage caution with position changes, particularly if elderly.  Recent Flowsheet Documentation  Taken 7/26/2024 0400 by Stephanie Moya RN  Medication Review/Management: medications reviewed  Taken 7/26/2024 0200 by Stephanie Moya RN  Medication Review/Management: medications reviewed  Taken 7/26/2024 0000 by Stephanie Moya RN  Medication Review/Management: medications reviewed  Taken 7/25/2024 2220 by Stephanie Moya RN  Medication Review/Management: medications reviewed  Taken 7/25/2024 2000 by Stephanie Moya RN  Medication Review/Management: medications reviewed  Taken 7/25/2024 1957 by Stephanie Moya RN  Medication Review/Management: medications reviewed   Goal Outcome Evaluation:      Patient is alert and oriented X3, pleasant, compliant with treatment regimen. VSS overnight, no acute event. Patient in good spirits and eager to ambulate with staff. Discharge planning is ongoing at this time. Auth has been sent to the [place]. Stephanie Moya RN

## 2024-07-26 NOTE — DISCHARGE PLACEMENT REQUEST
"Noman Jalloh (89 y.o. Female)       Date of Birth   1934    Social Security Number       Address   [address] [place] [address] [address] [place] [address]    Home Phone   [phone]    MRN   [id number]       Zoroastrianism   None    Marital Status                               Admission Date   24    Admission Type   Emergency    Admitting Provider   Coco Lira MD    Attending Provider   Coco Lira MD    Department, Room/Bed   [facility] 3G, S358/1       Discharge Date       Discharge Disposition   Rehab Facility or Unit (DC - External)    Discharge Destination                                 Attending Provider: Coco Lira MD    Allergies: Ace Inhibitors, Latex    Isolation: None   Infection: None   Code Status: CPR    Ht: 157.5 cm (62.01\")   Wt: 50.8 kg (112 lb)    Admission Cmt: None   Principal Problem: TIA (transient ischemic attack) [G45.9]                   Active Insurance as of 2024       Primary Coverage       Payor Plan Insurance Group Employer/Plan Group    HUMANA MEDICARE REPLACEMENT HU[employer] MED ADV GROUP [id number]       Payor Plan Address Payor Plan Phone Number Payor Plan Fax Number Effective Dates    [address] [address] [phone]  2018 - None Entered    Formerly [facility] [id number]         Subscriber Name Subscriber Birth Date Member ID       NOMAN JALLOH 1934 [id number]                     Emergency Contacts        (Rel.) Home Phone Work Phone Mobile Phone    STEPHANIE ESCAMILLA (Daughter) [phone] -- [phone]                 Discharge Summary        Leeanna Wang, APRN at [facility]              [facility] Medicine Services  DISCHARGE SUMMARY    Patient Name: Noman Jalloh  : 1934  MRN: [id number]    Date of Admission: 2024  9:37 AM  Date of Discharge:  2024  Primary Care Physician: Bart Pop APRN    Consults       Date and Time Order Name Status Description    2024  " 9:59 AM Inpatient Neurology Consult Stroke Completed             Hospital Course     Presenting Problem: f/u stroke     Active Hospital Problems    Diagnosis  POA    **TIA (transient ischemic attack) [G45.9]  Yes    Non-ischemic cardiomyopathy [I42.8]  Yes    CKD (chronic kidney disease) stage 3, GFR 30-59 ml/min [N18.30]  Yes    Hypothyroidism [E03.9]  Yes      Resolved Hospital Problems   No resolved problems to display.          Hospital Course:  Sylvia Jalloh is a 89 y.o. female  with history of NICM/HFrEF, CKD III, HTN, hypothyroidism here with acute dizziness/double vision.. patient has acute Left thalamic and left frontal infarct and was followed by stroke neurology. She also had a UTI and received IV rocephin, also possible ted LL PNA and was treated by doxy and rocephin.      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  Double Vision  -initial CT imaging with R ICA plaque/stenosis, 2 mm saccular aneurysm  -CT perfusion without core infarction  -MRI with acute left thalamic infarct, and punctate white matter infarct in the high anterior left frontal lobe   -on DAPT/statin  -Echo with EF 51-55%  -Stroke Neurology recommends monitoring for A.fib, if no evidence here would plan holter at discharge (has been ordered) If A.fib found would recommend switching DAPT to Eliquis  -PT/OT, applied eye patch to help w/double vision  -- follow up with stroke clinic 1 month     E.coli UTI  -s/p 5 days of rocephin       Possible Bilateral LL PNA  -CT with bilateral lower lung airspace disease with areas of mucous endobronchial plugging.  -s/p rocephin and two more doses of doxy to complete 5 days of therapy   -pulmonary toilet  -- stable on room air      History of NICM  History of HFrEF  Mild AoVStenosis  - Echo w/EF 51-55%, also noted mild aortic valve stenosis  -continue BB/entresto/diuretics  -previously prescribed Farxiga for HFrEF but instructed to stop, however family notes she may have continued taking it, hold at  discharge. She lives alone.     CKD  -stable renal function, at baseline    Patient has remained clinically stable and will be discharged to rehab today       Discharge Follow Up Recommendations for outpatient labs/diagnostics:   Follow up with pcp after dc from rehab  Follow up with stroke clinic 1 month     Day of Discharge     HPI:   Patient is sitting up in bed in NAD with family at bedside. She states she is doing well. Double vision improved if eye is patched. No acute events overnight per nursing. Plan for rehab today     Review of Systems  Gen- No fevers, chills  CV- No chest pain, palpitations  Resp- No cough, dyspnea  GI- No N/V/D, abd pain      Vital Signs:   Temp:  [97.5 °F (36.4 °C)-98.2 °F (36.8 °C)] 98.2 °F (36.8 °C)  Heart Rate:  [69-79] 76  Resp:  [16-18] 18  BP: (118-146)/(52-74) 133/63      Physical Exam:  Constitutional: No acute distress, awake, alert  HENT: NCAT, mucous membranes moist, left eye with patch   Respiratory: Clear to auscultation bilaterally, respiratory effort normal room air 95%  Cardiovascular: RRR, no murmurs, rubs, or gallops  Gastrointestinal: Positive bowel sounds, soft, nontender, nondistended  Musculoskeletal: No bilateral ankle edema  Psychiatric: Appropriate affect, cooperative  Neurologic: Oriented x 3, strength symmetric in all extremities, Cranial Nerves grossly intact to confrontation, speech clear  Skin: No rashes      Pertinent  and/or Most Recent Results     LAB RESULTS:      Lab 07/23/24  0333 07/22/24  1017 07/22/24  1009 07/22/24  1008   WBC 14.23* 14.37*  --   --    HEMOGLOBIN 12.0 11.8*  --   --    HEMOGLOBIN, POC  --   --  11.6*  --    HEMATOCRIT 36.5 35.1  --   --    HEMATOCRIT POC  --   --  34*  --    PLATELETS 206 203  --   --    NEUTROS ABS 10.55* 10.76*  --   --    IMMATURE GRANS (ABS) 0.05 0.05  --   --    LYMPHS ABS 2.04 2.40  --   --    MONOS ABS 1.38* 0.99*  --   --    EOS ABS 0.13 0.09  --   --    MCV 98.4* 96.7  --   --    PROTIME  --   --   --   14.5   APTT  --  44.4*  --   --          Lab 07/23/24  0333 07/22/24  1018 07/22/24  1009   SODIUM 136 136  --    POTASSIUM 4.5 4.2  --    CHLORIDE 102 101  --    CO2 22.0 21.0*  --    ANION GAP 12.0 14.0  --    BUN 24* 22  --    CREATININE 1.55* 1.51* 1.60*   EGFR 31.9* 32.9* 30.7*   GLUCOSE 104* 109*  --    CALCIUM 8.7 8.9  --    HEMOGLOBIN A1C 5.00  --   --          Lab 07/23/24  0333 07/22/24  1018   TOTAL PROTEIN 6.7 7.2   ALBUMIN 3.5 3.8   GLOBULIN 3.2 3.4   ALT (SGPT) 9 9  9   AST (SGOT) 22 21  23   BILIRUBIN 0.8 0.8   ALK PHOS 72 77         Lab 07/22/24  1306 07/22/24  1018 07/22/24  1008   HSTROP T 59* 54*  --    PROTIME  --   --  14.5   INR  --   --  1.2         Lab 07/23/24  0333   CHOLESTEROL 118   LDL CHOL 47   HDL CHOL 53   TRIGLYCERIDES 98             Brief Urine Lab Results  (Last result in the past 365 days)        Color   Clarity   Blood   Leuk Est   Nitrite   Protein   CREAT   Urine HCG        07/22/24 1146 Yellow   Clear   Trace   Moderate (2+)   Negative   Negative                 Microbiology Results (last 10 days)       Procedure Component Value - Date/Time    Blood Culture - Blood, Arm, Right [[id number]]  (Normal) Collected: 07/22/24 1321    Lab Status: Preliminary result Specimen: Blood from Arm, Right Updated: 07/25/24 1345     Blood Culture No growth at 3 days    Narrative:      Less than seven (7) mL's of blood was collected.  Insufficient quantity may yield false negative results.    Blood Culture - Blood, Arm, Left [[id number]]  (Normal) Collected: 07/22/24 1321    Lab Status: Preliminary result Specimen: Blood from Arm, Left Updated: 07/25/24 1345     Blood Culture No growth at 3 days    Narrative:      Less than seven (7) mL's of blood was collected.  Insufficient quantity may yield false negative results.    Urine Culture - Urine, Urine, Catheter [[id number]]  (Abnormal)  (Susceptibility) Collected: 07/22/24 1146    Lab Status: Final result Specimen: Urine, Catheter Updated: 07/24/24  1039     Urine Culture >100,000 CFU/mL Escherichia coli    Narrative:      Colonization of the urinary tract without infection is common. Treatment is discouraged unless the patient is symptomatic, pregnant, or undergoing an invasive urologic procedure.    Susceptibility        Escherichia coli      OLAF      Amoxicillin + Clavulanate Susceptible      Ampicillin Susceptible      Ampicillin + Sulbactam Susceptible      Cefazolin Susceptible      Cefepime Susceptible      Ceftazidime Susceptible      Ceftriaxone Susceptible      Gentamicin Susceptible      Levofloxacin Susceptible      Nitrofurantoin Susceptible      Piperacillin + Tazobactam Susceptible      Trimethoprim + Sulfamethoxazole Susceptible                                   EEG    Result Date: 7/24/2024  Reason for referral: 89 y.o.female with altered mental status Technical Summary:  A 19 channel digital EEG was performed using the international 10-20 placement system, including eye leads and EKG leads. Duration: 22 minutes Findings: The patient is resting quietly in bed.  The background shows diffuse low amplitude 5-8 Hz theta with intermixed faster alpha frequencies seen at times.  A posterior rhythm is not clearly seen.  Drowsiness and light sleep are seen with mild slowing of the background and vertex waves.  No focal features or epileptiform activity are seen.  Photic stimulation and hyperventilation are not performed. Video: Available Technical quality: Superior EKG: Regular, 70 bpm SUMMARY: Mild generalized slow No focal features or epileptiform activity are seen     Diffuse cerebral dysfunction of mild degree but nonspecific.  This is most commonly seen due to toxic/metabolic cause This report is transcribed using the Dragon dictation system.      MRI Brain Without Contrast    Result Date: 7/22/2024  MRI BRAIN WO CONTRAST Date of Exam: 7/22/2024 8:35 PM EDT Indication: Stroke, follow up Acute stroke suspected.  Comparison: None available.  Technique:  Routine multiplanar/multisequence sequence images of the brain were obtained without contrast administration. Findings: There is restricted diffusion in the anteromedial left thalamus. There is a punctate focus of restricted diffusion in the high anterior left frontal white matter. There is no suspicious susceptibility. The thalamic diffusion abnormality is associated with increased T2 signal. There are multifocal T2 signal hyperintensities in the supratentorial white matter not associated with restricted diffusion, compatible with chronic microvascular ischemic change. No cortical signal abnormality is demonstrated. Major intracranial flow voids are present. Cerebellar tonsils are in normal position. There is no abnormal extra-axial fluid collection.     Impression: 1. Acute left thalamic infarct, and punctate white matter infarct in the high anterior left frontal lobe 2. Chronic small vessel ischemic white matter changes Electronically Signed: Alvarez León  7/22/2024 9:33 PM EDT  Workstation ID: [id number]    Adult Transthoracic Echo Complete W/ Cont if Necessary Per Protocol (With Agitated Saline)    Result Date: 7/22/2024    Left ventricular systolic function is low normal. Left ventricular ejection fraction appears to be 51 - 55%.   Left ventricular wall thickness is consistent with borderline concentric hypertrophy.   Left ventricular diastolic function is consistent with (grade Ia w/high LAP) impaired relaxation.   Left atrial volume is severely increased.   Mild aortic valve stenosis is present. Aortic valve area is 1.1 cm2.   Peak velocity of the flow distal to the aortic valve is 270 cm/s. Aortic valve maximum pressure gradient is 29 mmHg. Aortic valve mean pressure gradient is 16 mmHg.   Estimated right ventricular systolic pressure from tricuspid regurgitation is normal (<35 mmHg). Calculated right ventricular systolic pressure from tricuspid regurgitation is 26 mmHg.   There is a left pleural  effusion. There is a right pleural effusion.   Mild to moderate mitral valve regurgitation EF slightly improved compared to previous study     CT Abdomen Pelvis Without Contrast    Result Date: 7/22/2024  CT ABDOMEN PELVIS WO CONTRAST Date of Exam: 7/22/2024 1:58 PM EDT Indication: back pain/UTI. Comparison: None available. Technique: Axial CT images were obtained of the abdomen and pelvis without the administration of contrast. Reconstructed coronal and sagittal images were also obtained. Automated exposure control and iterative construction methods were used. Findings: LOWER THORAX: There is bilateral lower lobe bronchial wall thickening with areas of irregular consolidation and mucous plugging. Correlate for signs of pulmonary infection either acute or chronic. There is a thin anterior pericardial effusion measuring up to 7 mm in thickness. There is heavy mitral annular calcification. LIVER:  Unremarkable parenchyma without focal lesion. BILIARY/GALLBLADDER: Cholecystectomy. SPLEEN:  Unremarkable PANCREAS:  Unremarkable ADRENAL: There is generalized left adrenal thickening. KIDNEYS:  Unremarkable parenchyma with no solid mass identified. There is contrast within the collecting system and ureters related to recent contrasted CT studies. No obstruction.  No calculus identified. GASTROINTESTINAL/MESENTERY: There is a moderate-sized sliding hiatal hernia no evidence of obstruction nor inflammation.  AORTA/IVC:  Normal caliber. RETROPERITONEUM/LYMPH NODES:  Unremarkable REPRODUCTIVE: Hysterectomy. There is contrast within the vagina presumably refluxed during micturition although vesicovaginal fistula not excluded. Correlate with symptoms. BLADDER:  Unremarkable OSSEUS STRUCTURES: Spinal degenerative changes without acute osseous process identified.     Impression: 1.Bilateral lower lung airspace disease with areas of mucous endobronchial plugging. Correlate for signs of acute or chronic infection/pneumonia. 2.There  is contrast within the vagina either refluxed during micturition or result of vesicovaginal fistula. Correlate with symptoms. 3.Other incidental nonemergent findings as detailed above. Electronically Signed: Julian Bishop MD  7/22/2024 2:20 PM EDT  Workstation ID: [id number]    XR Chest 1 View    Result Date: 7/22/2024  XR CHEST 1 VW Date of Exam: 7/22/2024 10:05 AM EDT Indication: Acute Stroke Protocol (onset < 12 hrs) Comparison: 7/7/2020 Findings: Heart size is within normal limits. Pulmonary vascular markings appear normal. There is a right-sided pleural effusion and right basilar atelectasis or airspace disease in the right costophrenic sulcus. There is airspace disease in the left base in the retrocardiac region.     Impression: 1. Small right pleural effusion with patchy bibasilar airspace disease. Electronically Signed: Domingo Mills MD  7/22/2024 10:58 AM EDT  Workstation ID: [id number]    CT Angiogram Head w AI Analysis of LVO    Result Date: 7/22/2024  CT ANGIOGRAM HEAD W AI ANALYSIS OF LVO, CT ANGIOGRAM NECK, CT CEREBRAL PERFUSION W WO CONTRAST Date of Exam: 7/22/2024 10:10 AM EDT Indication: Neuro Deficit, acute, Stroke suspected Neuro deficit, acute stroke suspected. Comparison: Noncontrast head CT from today Technique: CTA of the head was performed after the uneventful intravenous administration of 125 mL Isovue-370. Reconstructed coronal and sagittal images were also obtained. In addition, a 3-D volume rendered image was created for interpretation. Automated exposure control and iterative reconstruction methods were used. FINDINGS: Vascular Findings: Atherosclerotic plaque within the right carotid bifurcation and right carotid siphon. Focal plaque within the right proximal ICA with approximately 40 to 50% focal stenosis 1.5 cm beyond its origin. The right common carotid, remainder of the right ICA, middle cerebral, anterior cerebral, vertebral, and posterior cerebral arteries are patent without abrupt  cut off. 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 There is suggestion of a 2 mm saccular aneurysm seen about the junction of the right supraclinoid ICA/P-comm (series 5, images 237-238). Atherosclerotic plaque within the left carotid bifurcation and left carotid siphon. The left common carotid, internal carotid, middle cerebral, anterior cerebral, vertebral, and posterior cerebral arteries are patent without abrupt cut off. Basilar artery appears patent and appears unremarkable. Non-vascular Findings: For description of nonvascular intracranial findings, please refer to the noncontrast head CT performed the same date. No acute abnormality is identified within the visualized soft tissue or bony structures of the neck. The visualized lung apices are clear. CT Perfusion: CBF (<30%) volume: 0 mL Tmax (>6.0s) volume: 0 mL Mismatch volume: 0 mL Mismatch ratio: None      1.No acute abnormality identified within the large arteries of the head or neck. 2.Focal plaque within the right proximal ICA with approximately 40 to 50% focal stenosis 1.5 cm beyond its origin. 3.No significant stenosis of the left internal carotid artery. 4.There is suggestion of a 2 mm saccular aneurysm seen about the junction of the right supraclinoid ICA/P-comm (series 5, images 237-238). 5.CT perfusion study demonstrates no territorial ischemia or core infarct. Electronically Signed: Swapnil Mcfarland MD  7/22/2024 10:50 AM EDT  Workstation ID: [id number]    CT Angiogram Neck    Result Date: 7/22/2024  CT ANGIOGRAM HEAD W AI ANALYSIS OF LVO, CT ANGIOGRAM NECK, CT CEREBRAL PERFUSION W WO CONTRAST Date of Exam: 7/22/2024 10:10 AM EDT Indication: Neuro Deficit, acute, Stroke suspected Neuro deficit, acute stroke suspected. Comparison: Noncontrast head CT from today Technique: CTA of the head was performed after the uneventful intravenous administration of 125 mL Isovue-370. Reconstructed coronal and sagittal  images were also obtained. In addition, a 3-D volume rendered image was created for interpretation. Automated exposure control and iterative reconstruction methods were used. FINDINGS: Vascular Findings: Atherosclerotic plaque within the right carotid bifurcation and right carotid siphon. Focal plaque within the right proximal ICA with approximately 40 to 50% focal stenosis 1.5 cm beyond its origin. The right common carotid, remainder of the right ICA, middle cerebral, anterior cerebral, vertebral, and posterior cerebral arteries are patent without abrupt cut off. 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 There is suggestion of a 2 mm saccular aneurysm seen about the junction of the right supraclinoid ICA/P-comm (series 5, images 237-238). Atherosclerotic plaque within the left carotid bifurcation and left carotid siphon. The left common carotid, internal carotid, middle cerebral, anterior cerebral, vertebral, and posterior cerebral arteries are patent without abrupt cut off. Basilar artery appears patent and appears unremarkable. Non-vascular Findings: For description of nonvascular intracranial findings, please refer to the noncontrast head CT performed the same date. No acute abnormality is identified within the visualized soft tissue or bony structures of the neck. The visualized lung apices are clear. CT Perfusion: CBF (<30%) volume: 0 mL Tmax (>6.0s) volume: 0 mL Mismatch volume: 0 mL Mismatch ratio: None      1.No acute abnormality identified within the large arteries of the head or neck. 2.Focal plaque within the right proximal ICA with approximately 40 to 50% focal stenosis 1.5 cm beyond its origin. 3.No significant stenosis of the left internal carotid artery. 4.There is suggestion of a 2 mm saccular aneurysm seen about the junction of the right supraclinoid ICA/P-comm (series 5, images 237-238). 5.CT perfusion study demonstrates no territorial ischemia or core  infarct. Electronically Signed: Swapnil Mcfarland MD  7/22/2024 10:50 AM EDT  Workstation ID: [id number]    CT CEREBRAL PERFUSION WITH & WITHOUT CONTRAST    Result Date: 7/22/2024  CT ANGIOGRAM HEAD W AI ANALYSIS OF LVO, CT ANGIOGRAM NECK, CT CEREBRAL PERFUSION W WO CONTRAST Date of Exam: 7/22/2024 10:10 AM EDT Indication: Neuro Deficit, acute, Stroke suspected Neuro deficit, acute stroke suspected. Comparison: Noncontrast head CT from today Technique: CTA of the head was performed after the uneventful intravenous administration of 125 mL Isovue-370. Reconstructed coronal and sagittal images were also obtained. In addition, a 3-D volume rendered image was created for interpretation. Automated exposure control and iterative reconstruction methods were used. FINDINGS: Vascular Findings: Atherosclerotic plaque within the right carotid bifurcation and right carotid siphon. Focal plaque within the right proximal ICA with approximately 40 to 50% focal stenosis 1.5 cm beyond its origin. The right common carotid, remainder of the right ICA, middle cerebral, anterior cerebral, vertebral, and posterior cerebral arteries are patent without abrupt cut off. 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 There is suggestion of a 2 mm saccular aneurysm seen about the junction of the right supraclinoid ICA/P-comm (series 5, images 237-238). Atherosclerotic plaque within the left carotid bifurcation and left carotid siphon. The left common carotid, internal carotid, middle cerebral, anterior cerebral, vertebral, and posterior cerebral arteries are patent without abrupt cut off. Basilar artery appears patent and appears unremarkable. Non-vascular Findings: For description of nonvascular intracranial findings, please refer to the noncontrast head CT performed the same date. No acute abnormality is identified within the visualized soft tissue or bony structures of the neck. The visualized lung apices  are clear. CT Perfusion: CBF (<30%) volume: 0 mL Tmax (>6.0s) volume: 0 mL Mismatch volume: 0 mL Mismatch ratio: None      1.No acute abnormality identified within the large arteries of the head or neck. 2.Focal plaque within the right proximal ICA with approximately 40 to 50% focal stenosis 1.5 cm beyond its origin. 3.No significant stenosis of the left internal carotid artery. 4.There is suggestion of a 2 mm saccular aneurysm seen about the junction of the right supraclinoid ICA/P-comm (series 5, images 237-238). 5.CT perfusion study demonstrates no territorial ischemia or core infarct. Electronically Signed: Swapnil Mcfarland MD  7/22/2024 10:50 AM EDT  Workstation ID: [id number]    CT Head Without Contrast Stroke Protocol    Result Date: 7/22/2024  CT HEAD WO CONTRAST STROKE PROTOCOL Date of Exam: 7/22/2024 10:00 AM EDT Indication: Neuro deficit, acute, stroke suspected Neuro Deficit, acute, Stroke suspected. Comparison: None available. Technique: Axial CT images were obtained of the head without contrast administration.  Reconstructed coronal images were also obtained. Automated exposure control and iterative construction methods were used. Scan Time: 10:05 a.m. Study reviewed by the stroke team at time of scan. Report dictated and signed at 10:38 a.m., 7/22/2024. Findings: The calvarium appears intact. Included paranasal sinuses and mastoids appear clear. There is age-appropriate generalized cerebral atrophy. No hemorrhage, contusion or edema is seen. There is no evidence of acute infarction, mass or mass effect, hydrocephalus, or abnormal extra-axial collection. There is symmetric chronic central white matter change, typical of microvascular leukoencephalopathy.     Impression: Chronic appearing changes of the aging brain. No evidence of acute intracranial disease. Electronically Signed: Arpit Trevino MD  7/22/2024 10:38 AM EDT  Workstation ID: [id number]     Results for orders placed during the hospital encounter of  09/21/17    Duplex Renal Artery - Bilateral Complete CAR    Interpretation Summary  EXAMINATION: BILATERAL DUPLEX RENAL ULTRASOUND - 09/23/2017    HISTORY: Hypertension.    FINDINGS: Kidneys are within normal limits in size, both approximately 9  cm. Images obtained show grossly normal-appearing renal cortex and no  hydronephrosis. Peak systolic renal artery velocities are within normal  limits bilaterally, 150 cm/s or less for the left renal artery and  maximum of 193 cm/s for the mid right renal artery. Renal arteries are  noted to be tortuous, not unusual for patient's age. Resistive index for  the right kidney is upper normal, 0.74, but clearly elevated for the  left kidney at 0.83 indicating some renal parenchymal disease may be  present. Renal aortic ratios are calculated as 2.5 on the right and 2.2  on the left, not significantly increased. Renal veins appear patent.  Incidental note is made of elevated peak systolic velocities in the  celiac axis of 335 cm/s.    Impression  1. No evidence of hemodynamically significant renal artery stenosis.  2. Elevated resistive indices of the left kidney suggesting some  parenchymal disease may be present.    DICTATED:     09/24/2017  EDITED:         09/24/2017    This report was finalized on 9/25/2017 9:38 AM by DR. Arpit Trevino MD.      Results for orders placed during the hospital encounter of 09/21/17    Duplex Renal Artery - Bilateral Complete CAR    Interpretation Summary  EXAMINATION: BILATERAL DUPLEX RENAL ULTRASOUND - 09/23/2017    HISTORY: Hypertension.    FINDINGS: Kidneys are within normal limits in size, both approximately 9  cm. Images obtained show grossly normal-appearing renal cortex and no  hydronephrosis. Peak systolic renal artery velocities are within normal  limits bilaterally, 150 cm/s or less for the left renal artery and  maximum of 193 cm/s for the mid right renal artery. Renal arteries are  noted to be tortuous, not unusual for patient's age.  Resistive index for  the right kidney is upper normal, 0.74, but clearly elevated for the  left kidney at 0.83 indicating some renal parenchymal disease may be  present. Renal aortic ratios are calculated as 2.5 on the right and 2.2  on the left, not significantly increased. Renal veins appear patent.  Incidental note is made of elevated peak systolic velocities in the  celiac axis of 335 cm/s.    Impression  1. No evidence of hemodynamically significant renal artery stenosis.  2. Elevated resistive indices of the left kidney suggesting some  parenchymal disease may be present.    DICTATED:     09/24/2017  EDITED:         09/24/2017    This report was finalized on 9/25/2017 9:38 AM by DR. Arpit Trevino MD.      Results for orders placed during the hospital encounter of 07/22/24    Adult Transthoracic Echo Complete W/ Cont if Necessary Per Protocol (With Agitated Saline)    Interpretation Summary    Left ventricular systolic function is low normal. Left ventricular ejection fraction appears to be 51 - 55%.    Left ventricular wall thickness is consistent with borderline concentric hypertrophy.    Left ventricular diastolic function is consistent with (grade Ia w/high LAP) impaired relaxation.    Left atrial volume is severely increased.    Mild aortic valve stenosis is present. Aortic valve area is 1.1 cm2.    Peak velocity of the flow distal to the aortic valve is 270 cm/s. Aortic valve maximum pressure gradient is 29 mmHg. Aortic valve mean pressure gradient is 16 mmHg.    Estimated right ventricular systolic pressure from tricuspid regurgitation is normal (<35 mmHg). Calculated right ventricular systolic pressure from tricuspid regurgitation is 26 mmHg.    There is a left pleural effusion. There is a right pleural effusion.    Mild to moderate mitral valve regurgitation    EF slightly improved compared to previous study      Plan for Follow-up of Pending Labs/Results:   Pending Labs       Order Current Status     Blood Culture - Blood, Arm, Left Preliminary result    Blood Culture - Blood, Arm, Right Preliminary result          Discharge Details        Discharge Medications        New Medications        Instructions Start Date   aspirin 81 MG chewable tablet   81 mg, Oral, Daily   Start Date: July 27, 2024     atorvastatin 80 MG tablet  Commonly known as: LIPITOR   80 mg, Oral, Nightly      clopidogrel 75 MG tablet  Commonly known as: PLAVIX   75 mg, Oral, Daily   Start Date: July 27, 2024     doxycycline 100 MG capsule  Commonly known as: MONODOX   100 mg, Oral, Every 12 Hours Scheduled      guaiFENesin 600 MG 12 hr tablet  Commonly known as: MUCINEX   600 mg, Oral, Every 12 Hours Scheduled             Changes to Medications        Instructions Start Date   amLODIPine 2.5 MG tablet  Commonly known as: NORVASC  What changed:   medication strength  when to take this   2.5 mg, Oral, Daily, Per patient, take two tabs daily             Continue These Medications        Instructions Start Date   carvedilol 12.5 MG tablet  Commonly known as: COREG   12.5 mg, Oral, 2 Times Daily With Meals      cetirizine 10 MG tablet  Commonly known as: zyrTEC   10 mg, Oral, Daily      Entresto 49-51 MG tablet  Generic drug: sacubitril-valsartan   1 tablet, Oral      estradiol 1 MG tablet  Commonly known as: ESTRACE   1 mg, Oral, Daily, Taking 1/2 tablet qd      furosemide 20 MG tablet  Commonly known as: LASIX   20 mg, Oral, Daily      hydrOXYzine 25 MG tablet  Commonly known as: ATARAX   25 mg, Oral, 3 Times Daily PRN      levothyroxine 75 MCG tablet  Commonly known as: SYNTHROID, LEVOTHROID   75 mcg, Oral, Daily      meclizine 25 MG tablet  Commonly known as: ANTIVERT   25 mg, Oral, Every 6 Hours PRN      Multivitamin Adults 50+ tablet tablet  Generic drug: multivitamin with minerals   1 tablet, Oral, Daily      ondansetron ODT 4 MG disintegrating tablet  Commonly known as: ZOFRAN-ODT   4 mg, Oral, Every 8 Hours PRN      spironolactone 25 MG  tablet  Commonly known as: ALDACTONE   25 mg, Oral, Daily      Vitamin D (Cholecalciferol) 25 MCG (1000 UT) capsule   1,000 Units, Oral, Daily               Allergies   Allergen Reactions    Ace Inhibitors Rash    Latex Rash         Discharge Disposition:  Rehab Facility or Unit (DC - External)    Diet:  Hospital:  Diet Order   Procedures    Diet: Regular/House; Fluid Consistency: Thin (IDDSI 0)       Diet Instructions       Diet: Regular/House Diet; Regular (IDDSI 7); Thin (IDDSI 0)      Discharge Diet: Regular/House Diet    Texture: Regular (IDDSI 7)    Fluid Consistency: Thin (IDDSI 0)             Activity:  Activity Instructions       Activity as Tolerated      Measure Blood Pressure              Restrictions or Other Recommendations:         CODE STATUS:    Code Status and Medical Interventions:   Ordered at: 07/22/24 1302     Code Status (Patient has no pulse and is not breathing):    CPR (Attempt to Resuscitate)     Medical Interventions (Patient has pulse or is breathing):    Full Support       No future appointments.    Additional Instructions for the Follow-ups that You Need to Schedule       Discharge Follow-up with PCP   As directed       Currently Documented PCP:    Bart Pop APRN    PCP Phone Number:    [phone]     Follow Up Details: follow up wt pcp after dc from rehab        Discharge Follow-up with Specified Provider: Follow up with stroke clinic 1 month   As directed      To: Follow up with stroke clinic 1 month                      LOREN Phillips  07/26/24      Time Spent on Discharge:  I spent  45  minutes on this discharge activity which included: face-to-face encounter with the patient, reviewing the data in the system, coordination of the care with the nursing staff as well as consultants, documentation, and entering orders.            Electronically signed by Leeanna Wang APRN at 07/26/24 0164

## 2024-07-26 NOTE — PLAN OF CARE
Goal Outcome Evaluation:  Plan of Care Reviewed With: patient        Progress: improving  Outcome Evaluation: Patient demonstrated improving mobility overall. She continues to be below her baseline and I continue to recommend SNF      Anticipated Discharge Disposition (PT): skilled nursing facility

## 2024-07-27 LAB
BACTERIA SPEC AEROBE CULT: NORMAL
BACTERIA SPEC AEROBE CULT: NORMAL

## 2024-07-30 ENCOUNTER — TELEPHONE (OUTPATIENT)
Dept: CARDIOLOGY | Facility: CLINIC | Age: 89
End: 2024-07-30
Payer: MEDICARE

## 2024-07-30 NOTE — TELEPHONE ENCOUNTER
SUMAN SHAFER RN CALLED FROM THE [facility].  SHE WANTED TO KNOW MORE INFORMATION ABOUT THE PATIENT'S MONITOR.  I EXPLAINED 14 DAY MONITOR, FOR STROKE AND PALPS, PT MUST KEEP MONITOR ON HER PERSON AND GIVE TO THE FAMILY TO SEND BACK UPS.  SHE AGREED AND WE HUNG UP THE PHONE.

## 2024-08-16 ENCOUNTER — TELEPHONE (OUTPATIENT)
Dept: CARDIOLOGY | Facility: CLINIC | Age: 89
End: 2024-08-16
Payer: MEDICARE

## 2024-08-16 NOTE — TELEPHONE ENCOUNTER
Spoke with daughter, patient is still inpatient at the [facility] in [place],  [phone], spoke with Mariaa, Nurse, she took verbal order for Eliquis 2.5 mg BID

## 2024-09-25 ENCOUNTER — OFFICE VISIT (OUTPATIENT)
Dept: NEUROLOGY | Facility: CLINIC | Age: 89
End: 2024-09-25
Payer: MEDICARE

## 2024-09-25 VITALS
OXYGEN SATURATION: 99 % | HEIGHT: 62 IN | HEART RATE: 76 BPM | SYSTOLIC BLOOD PRESSURE: 134 MMHG | DIASTOLIC BLOOD PRESSURE: 78 MMHG | TEMPERATURE: 98.6 F | WEIGHT: 112 LBS | BODY MASS INDEX: 20.61 KG/M2

## 2024-09-25 DIAGNOSIS — Z86.73 HISTORY OF STROKE: Primary | ICD-10-CM

## 2024-09-25 RX ORDER — LORATADINE 10 MG/1
10 TABLET ORAL DAILY
COMMUNITY

## 2024-09-25 RX ORDER — ACETAMINOPHEN 325 MG/1
650 TABLET ORAL EVERY 6 HOURS PRN
COMMUNITY

## 2024-09-25 RX ORDER — ASPIRIN 81 MG/1
81 TABLET ORAL DAILY
Qty: 30 TABLET | Refills: 11 | Status: SHIPPED | OUTPATIENT
Start: 2024-09-25 | End: 2025-09-25

## 2024-09-25 RX ORDER — CLOPIDOGREL BISULFATE 75 MG/1
75 TABLET ORAL DAILY
COMMUNITY
End: 2024-09-25

## 2024-09-25 RX ORDER — FAMOTIDINE 40 MG/1
40 TABLET, FILM COATED ORAL DAILY
COMMUNITY

## 2024-09-25 RX ORDER — ATORVASTATIN CALCIUM 80 MG/1
80 TABLET, FILM COATED ORAL DAILY
COMMUNITY

## 2024-09-25 RX ORDER — DAPAGLIFLOZIN 5 MG/1
5 TABLET, FILM COATED ORAL DAILY
COMMUNITY

## 2025-02-06 ENCOUNTER — APPOINTMENT (OUTPATIENT)
Dept: GENERAL RADIOLOGY | Facility: HOSPITAL | Age: OVER 89
DRG: 291 | End: 2025-02-06
Payer: MEDICARE

## 2025-02-06 ENCOUNTER — HOSPITAL ENCOUNTER (INPATIENT)
Facility: HOSPITAL | Age: OVER 89
LOS: 1 days | Discharge: HOME | DRG: 291 | End: 2025-02-08
Attending: EMERGENCY MEDICINE | Admitting: PEDIATRICS
Payer: MEDICARE

## 2025-02-06 DIAGNOSIS — I50.9 ACUTE ON CHRONIC CONGESTIVE HEART FAILURE, UNSPECIFIED HEART FAILURE TYPE: ICD-10-CM

## 2025-02-06 DIAGNOSIS — Z86.79 HISTORY OF HYPERTENSION: ICD-10-CM

## 2025-02-06 DIAGNOSIS — J96.01 ACUTE RESPIRATORY FAILURE WITH HYPOXIA: Primary | ICD-10-CM

## 2025-02-06 DIAGNOSIS — N18.9 CHRONIC KIDNEY DISEASE, UNSPECIFIED CKD STAGE: ICD-10-CM

## 2025-02-06 LAB
ALBUMIN SERPL-MCNC: 3.9 G/DL (ref 3.5–5.2)
ALBUMIN/GLOB SERPL: 1.3 G/DL
ALP SERPL-CCNC: 281 U/L (ref 39–117)
ALT SERPL W P-5'-P-CCNC: 20 U/L (ref 1–33)
ANION GAP SERPL CALCULATED.3IONS-SCNC: 15 MMOL/L (ref 5–15)
ARTERIAL PATENCY WRIST A: POSITIVE
AST SERPL-CCNC: 34 U/L (ref 1–32)
ATMOSPHERIC PRESS: ABNORMAL MM[HG]
BASE EXCESS BLDA CALC-SCNC: -1.6 MMOL/L (ref 0–2)
BASOPHILS # BLD AUTO: 0.07 10*3/MM3 (ref 0–0.2)
BASOPHILS NFR BLD AUTO: 0.4 % (ref 0–1.5)
BDY SITE: ABNORMAL
BILIRUB SERPL-MCNC: 1.5 MG/DL (ref 0–1.2)
BODY TEMPERATURE: 37
BUN SERPL-MCNC: 33 MG/DL (ref 8–23)
BUN/CREAT SERPL: 19.8 (ref 7–25)
CALCIUM SPEC-SCNC: 8.9 MG/DL (ref 8.2–9.6)
CHLORIDE SERPL-SCNC: 109 MMOL/L (ref 98–107)
CO2 BLDA-SCNC: 22.8 MMOL/L (ref 22–33)
CO2 SERPL-SCNC: 20 MMOL/L (ref 22–29)
COHGB MFR BLD: 1.7 % (ref 0–2)
CREAT SERPL-MCNC: 1.67 MG/DL (ref 0.57–1)
D-LACTATE SERPL-SCNC: 1.7 MMOL/L (ref 0.5–2)
DEPRECATED RDW RBC AUTO: 47.7 FL (ref 37–54)
EGFRCR SERPLBLD CKD-EPI 2021: 29 ML/MIN/1.73
EOSINOPHIL # BLD AUTO: 0.04 10*3/MM3 (ref 0–0.4)
EOSINOPHIL NFR BLD AUTO: 0.3 % (ref 0.3–6.2)
EPAP: 0
ERYTHROCYTE [DISTWIDTH] IN BLOOD BY AUTOMATED COUNT: 14.1 % (ref 12.3–15.4)
FLUAV SUBTYP SPEC NAA+PROBE: NOT DETECTED
FLUBV RNA ISLT QL NAA+PROBE: NOT DETECTED
GLOBULIN UR ELPH-MCNC: 3 GM/DL
GLUCOSE SERPL-MCNC: 188 MG/DL (ref 65–99)
HCO3 BLDA-SCNC: 21.9 MMOL/L (ref 20–26)
HCT VFR BLD AUTO: 30.6 % (ref 34–46.6)
HCT VFR BLD CALC: 29.6 % (ref 38–51)
HGB BLD-MCNC: 10 G/DL (ref 12–15.9)
HGB BLDA-MCNC: 9.7 G/DL (ref 14–18)
HOLD SPECIMEN: NORMAL
IMM GRANULOCYTES # BLD AUTO: 0.07 10*3/MM3 (ref 0–0.05)
IMM GRANULOCYTES NFR BLD AUTO: 0.4 % (ref 0–0.5)
INHALED O2 CONCENTRATION: 50 %
IPAP: 0
LYMPHOCYTES # BLD AUTO: 1.21 10*3/MM3 (ref 0.7–3.1)
LYMPHOCYTES NFR BLD AUTO: 7.8 % (ref 19.6–45.3)
MCH RBC QN AUTO: 30.1 PG (ref 26.6–33)
MCHC RBC AUTO-ENTMCNC: 32.7 G/DL (ref 31.5–35.7)
MCV RBC AUTO: 92.2 FL (ref 79–97)
METHGB BLD QL: -0.1 % (ref 0–1.5)
MODALITY: ABNORMAL
MONOCYTES # BLD AUTO: 1.28 10*3/MM3 (ref 0.1–0.9)
MONOCYTES NFR BLD AUTO: 8.2 % (ref 5–12)
NEUTROPHILS NFR BLD AUTO: 12.94 10*3/MM3 (ref 1.7–7)
NEUTROPHILS NFR BLD AUTO: 82.9 % (ref 42.7–76)
NRBC BLD AUTO-RTO: 0 /100 WBC (ref 0–0.2)
NT-PROBNP SERPL-MCNC: 4769 PG/ML (ref 0–1800)
OXYHGB MFR BLDV: 93.8 % (ref 94–99)
PAW @ PEAK INSP FLOW SETTING VENT: 0 CMH2O
PCO2 BLDA: 31.7 MM HG (ref 35–45)
PCO2 TEMP ADJ BLD: 31.7 MM HG (ref 35–45)
PH BLDA: 7.45 PH UNITS (ref 7.35–7.45)
PH, TEMP CORRECTED: 7.45 PH UNITS
PLATELET # BLD AUTO: 148 10*3/MM3 (ref 140–450)
PMV BLD AUTO: 11 FL (ref 6–12)
PO2 BLDA: 70.9 MM HG (ref 83–108)
PO2 TEMP ADJ BLD: 70.9 MM HG (ref 83–108)
POTASSIUM SERPL-SCNC: 4 MMOL/L (ref 3.5–5.2)
PROCALCITONIN SERPL-MCNC: 0.12 NG/ML (ref 0–0.25)
PROT SERPL-MCNC: 6.9 G/DL (ref 6–8.5)
RBC # BLD AUTO: 3.32 10*6/MM3 (ref 3.77–5.28)
SARS-COV-2 RNA RESP QL NAA+PROBE: NOT DETECTED
SODIUM SERPL-SCNC: 144 MMOL/L (ref 136–145)
TOTAL RATE: 0 BREATHS/MINUTE
TROPONIN T SERPL HS-MCNC: 63 NG/L
WBC NRBC COR # BLD AUTO: 15.61 10*3/MM3 (ref 3.4–10.8)
WHOLE BLOOD HOLD COAG: NORMAL
WHOLE BLOOD HOLD SPECIMEN: NORMAL

## 2025-02-06 PROCEDURE — 83880 ASSAY OF NATRIURETIC PEPTIDE: CPT | Performed by: EMERGENCY MEDICINE

## 2025-02-06 PROCEDURE — 82805 BLOOD GASES W/O2 SATURATION: CPT

## 2025-02-06 PROCEDURE — 83050 HGB METHEMOGLOBIN QUAN: CPT

## 2025-02-06 PROCEDURE — 71045 X-RAY EXAM CHEST 1 VIEW: CPT

## 2025-02-06 PROCEDURE — 87040 BLOOD CULTURE FOR BACTERIA: CPT | Performed by: EMERGENCY MEDICINE

## 2025-02-06 PROCEDURE — 25010000002 CEFTRIAXONE PER 250 MG: Performed by: EMERGENCY MEDICINE

## 2025-02-06 PROCEDURE — 82375 ASSAY CARBOXYHB QUANT: CPT

## 2025-02-06 PROCEDURE — 85025 COMPLETE CBC W/AUTO DIFF WBC: CPT | Performed by: EMERGENCY MEDICINE

## 2025-02-06 PROCEDURE — 99285 EMERGENCY DEPT VISIT HI MDM: CPT

## 2025-02-06 PROCEDURE — 83605 ASSAY OF LACTIC ACID: CPT | Performed by: EMERGENCY MEDICINE

## 2025-02-06 PROCEDURE — 93005 ELECTROCARDIOGRAM TRACING: CPT

## 2025-02-06 PROCEDURE — 25010000002 FUROSEMIDE PER 20 MG: Performed by: EMERGENCY MEDICINE

## 2025-02-06 PROCEDURE — 36600 WITHDRAWAL OF ARTERIAL BLOOD: CPT

## 2025-02-06 PROCEDURE — 94660 CPAP INITIATION&MGMT: CPT

## 2025-02-06 PROCEDURE — 84484 ASSAY OF TROPONIN QUANT: CPT | Performed by: EMERGENCY MEDICINE

## 2025-02-06 PROCEDURE — 87636 SARSCOV2 & INF A&B AMP PRB: CPT | Performed by: EMERGENCY MEDICINE

## 2025-02-06 PROCEDURE — 80053 COMPREHEN METABOLIC PANEL: CPT | Performed by: EMERGENCY MEDICINE

## 2025-02-06 PROCEDURE — 93005 ELECTROCARDIOGRAM TRACING: CPT | Performed by: EMERGENCY MEDICINE

## 2025-02-06 PROCEDURE — 36415 COLL VENOUS BLD VENIPUNCTURE: CPT

## 2025-02-06 PROCEDURE — 84145 PROCALCITONIN (PCT): CPT | Performed by: EMERGENCY MEDICINE

## 2025-02-06 PROCEDURE — 94799 UNLISTED PULMONARY SVC/PX: CPT

## 2025-02-06 RX ORDER — SODIUM CHLORIDE 0.9 % (FLUSH) 0.9 %
10 SYRINGE (ML) INJECTION AS NEEDED
Status: DISCONTINUED | OUTPATIENT
Start: 2025-02-06 | End: 2025-02-08 | Stop reason: HOSPADM

## 2025-02-06 RX ORDER — DOXYCYCLINE 100 MG/1
100 CAPSULE ORAL ONCE
Status: COMPLETED | OUTPATIENT
Start: 2025-02-06 | End: 2025-02-06

## 2025-02-06 RX ORDER — FUROSEMIDE 10 MG/ML
80 INJECTION INTRAMUSCULAR; INTRAVENOUS ONCE
Status: COMPLETED | OUTPATIENT
Start: 2025-02-06 | End: 2025-02-06

## 2025-02-06 RX ADMIN — DOXYCYCLINE 100 MG: 100 CAPSULE ORAL at 23:48

## 2025-02-06 RX ADMIN — FUROSEMIDE 80 MG: 10 INJECTION, SOLUTION INTRAMUSCULAR; INTRAVENOUS at 23:48

## 2025-02-06 RX ADMIN — SODIUM CHLORIDE 1000 MG: 900 INJECTION INTRAVENOUS at 23:48

## 2025-02-07 ENCOUNTER — APPOINTMENT (OUTPATIENT)
Dept: CARDIOLOGY | Facility: HOSPITAL | Age: OVER 89
DRG: 291 | End: 2025-02-07
Payer: MEDICARE

## 2025-02-07 PROBLEM — E78.2 MIXED HYPERLIPIDEMIA: Status: ACTIVE | Noted: 2025-02-07

## 2025-02-07 PROBLEM — N18.32 CKD STAGE 3B, GFR 30-44 ML/MIN: Status: ACTIVE | Noted: 2017-07-27

## 2025-02-07 PROBLEM — I48.0 PAF (PAROXYSMAL ATRIAL FIBRILLATION): Status: ACTIVE | Noted: 2025-02-07

## 2025-02-07 PROBLEM — Z86.73 HISTORY OF CARDIOEMBOLIC CEREBROVASCULAR ACCIDENT (CVA): Status: ACTIVE | Noted: 2025-02-07

## 2025-02-07 PROBLEM — D50.9 IRON DEFICIENCY ANEMIA: Status: ACTIVE | Noted: 2025-02-07

## 2025-02-07 PROBLEM — I50.23 ACUTE ON CHRONIC SYSTOLIC CHF (CONGESTIVE HEART FAILURE): Status: ACTIVE | Noted: 2017-07-27

## 2025-02-07 PROBLEM — I10 PRIMARY HYPERTENSION: Status: ACTIVE | Noted: 2017-07-27

## 2025-02-07 PROBLEM — J96.00 ACUTE RESPIRATORY FAILURE: Status: ACTIVE | Noted: 2025-02-07

## 2025-02-07 LAB
ALBUMIN SERPL-MCNC: 3.8 G/DL (ref 3.5–5.2)
ALBUMIN/GLOB SERPL: 1.3 G/DL
ALP SERPL-CCNC: 245 U/L (ref 39–117)
ALT SERPL W P-5'-P-CCNC: 16 U/L (ref 1–33)
ANION GAP SERPL CALCULATED.3IONS-SCNC: 13 MMOL/L (ref 5–15)
AST SERPL-CCNC: 25 U/L (ref 1–32)
AV MEAN PRESS GRAD SYS DOP V1V2: 14.3 MMHG
AV VMAX SYS DOP: 255.5 CM/SEC
BASOPHILS # BLD MANUAL: 0 10*3/MM3 (ref 0–0.2)
BASOPHILS NFR BLD MANUAL: 0 % (ref 0–1.5)
BH CV ECHO MEAS - AO MAX PG: 26.1 MMHG
BH CV ECHO MEAS - AO ROOT DIAM: 3.5 CM
BH CV ECHO MEAS - AO V2 VTI: 60.9 CM
BH CV ECHO MEAS - AVA(I,D): 1.47 CM2
BH CV ECHO MEAS - EDV(CUBED): 39.3 ML
BH CV ECHO MEAS - EDV(MOD-SP2): 166 ML
BH CV ECHO MEAS - EDV(MOD-SP4): 121 ML
BH CV ECHO MEAS - EF(MOD-SP2): 63.7 %
BH CV ECHO MEAS - EF(MOD-SP4): 51.3 %
BH CV ECHO MEAS - ESV(CUBED): 17.6 ML
BH CV ECHO MEAS - ESV(MOD-SP2): 60.3 ML
BH CV ECHO MEAS - ESV(MOD-SP4): 58.9 ML
BH CV ECHO MEAS - FS: 23.5 %
BH CV ECHO MEAS - IVS/LVPW: 1.2 CM
BH CV ECHO MEAS - IVSD: 1.2 CM
BH CV ECHO MEAS - LA DIMENSION: 4.2 CM
BH CV ECHO MEAS - LAT PEAK E' VEL: 9.8 CM/SEC
BH CV ECHO MEAS - LV MASS(C)D: 114 GRAMS
BH CV ECHO MEAS - LV MAX PG: 5.1 MMHG
BH CV ECHO MEAS - LV MEAN PG: 3 MMHG
BH CV ECHO MEAS - LV V1 MAX: 113 CM/SEC
BH CV ECHO MEAS - LV V1 VTI: 28.5 CM
BH CV ECHO MEAS - LVIDD: 3.4 CM
BH CV ECHO MEAS - LVIDS: 2.6 CM
BH CV ECHO MEAS - LVOT AREA: 3.1 CM2
BH CV ECHO MEAS - LVOT DIAM: 2 CM
BH CV ECHO MEAS - LVPWD: 1 CM
BH CV ECHO MEAS - MED PEAK E' VEL: 7.2 CM/SEC
BH CV ECHO MEAS - MR MAX PG: 118.8 MMHG
BH CV ECHO MEAS - MR MAX VEL: 545 CM/SEC
BH CV ECHO MEAS - MR MEAN PG: 77 MMHG
BH CV ECHO MEAS - MR MEAN VEL: 418 CM/SEC
BH CV ECHO MEAS - MR VTI: 201 CM
BH CV ECHO MEAS - MV A MAX VEL: 103 CM/SEC
BH CV ECHO MEAS - MV DEC SLOPE: 477.5 CM/SEC2
BH CV ECHO MEAS - MV DEC TIME: 0.18 SEC
BH CV ECHO MEAS - MV E MAX VEL: 124 CM/SEC
BH CV ECHO MEAS - MV E/A: 1.2
BH CV ECHO MEAS - MV MAX PG: 10 MMHG
BH CV ECHO MEAS - MV MEAN PG: 3.5 MMHG
BH CV ECHO MEAS - MV P1/2T: 81 MSEC
BH CV ECHO MEAS - MV V2 VTI: 43.5 CM
BH CV ECHO MEAS - MVA(P1/2T): 2.7 CM2
BH CV ECHO MEAS - MVA(VTI): 2.06 CM2
BH CV ECHO MEAS - PA ACC TIME: 0.12 SEC
BH CV ECHO MEAS - PI END-D VEL: 132 CM/SEC
BH CV ECHO MEAS - RAP SYSTOLE: 5 MMHG
BH CV ECHO MEAS - RVSP: 46 MMHG
BH CV ECHO MEAS - SV(LVOT): 89.5 ML
BH CV ECHO MEAS - SV(MOD-SP2): 105.7 ML
BH CV ECHO MEAS - SV(MOD-SP4): 62.1 ML
BH CV ECHO MEAS - TAPSE (>1.6): 2.14 CM
BH CV ECHO MEAS - TR MAX PG: 41 MMHG
BH CV ECHO MEAS - TR MAX VEL: 319 CM/SEC
BH CV ECHO MEASUREMENTS AVERAGE E/E' RATIO: 14.59
BH CV XLRA - RV BASE: 4.8 CM
BH CV XLRA - RV LENGTH: 5.3 CM
BH CV XLRA - RV MID: 3.2 CM
BH CV XLRA - TDI S': 18.2 CM/SEC
BILIRUB SERPL-MCNC: 1 MG/DL (ref 0–1.2)
BUN SERPL-MCNC: 36 MG/DL (ref 8–23)
BUN/CREAT SERPL: 21.6 (ref 7–25)
CALCIUM SPEC-SCNC: 9.1 MG/DL (ref 8.2–9.6)
CHLORIDE SERPL-SCNC: 106 MMOL/L (ref 98–107)
CO2 SERPL-SCNC: 22 MMOL/L (ref 22–29)
CREAT SERPL-MCNC: 1.67 MG/DL (ref 0.57–1)
DEPRECATED RDW RBC AUTO: 47.8 FL (ref 37–54)
EGFRCR SERPLBLD CKD-EPI 2021: 29 ML/MIN/1.73
EOSINOPHIL # BLD MANUAL: 0 10*3/MM3 (ref 0–0.4)
EOSINOPHIL NFR BLD MANUAL: 0 % (ref 0.3–6.2)
ERYTHROCYTE [DISTWIDTH] IN BLOOD BY AUTOMATED COUNT: 13.9 % (ref 12.3–15.4)
FERRITIN SERPL-MCNC: 56.55 NG/ML (ref 13–150)
FOLATE SERPL-MCNC: >20 NG/ML (ref 4.78–24.2)
GEN 5 1HR TROPONIN T REFLEX: 58 NG/L
GLOBULIN UR ELPH-MCNC: 2.9 GM/DL
GLUCOSE SERPL-MCNC: 200 MG/DL (ref 65–99)
HCT VFR BLD AUTO: 29.4 % (ref 34–46.6)
HEMOCCULT STL QL: NEGATIVE
HGB BLD-MCNC: 9.4 G/DL (ref 12–15.9)
IRON 24H UR-MRATE: 22 MCG/DL (ref 37–145)
IRON SATN MFR SERPL: 5 % (ref 20–50)
LEFT ATRIUM VOLUME INDEX: 63.8 ML/M2
LV EF 2D ECHO EST: 60 %
LV EF BIPLANE MOD: 57.9 %
LYMPHOCYTES # BLD MANUAL: 0.35 10*3/MM3 (ref 0.7–3.1)
LYMPHOCYTES NFR BLD MANUAL: 2 % (ref 5–12)
MAGNESIUM SERPL-MCNC: 2 MG/DL (ref 1.6–2.4)
MCH RBC QN AUTO: 29.6 PG (ref 26.6–33)
MCHC RBC AUTO-ENTMCNC: 32 G/DL (ref 31.5–35.7)
MCV RBC AUTO: 92.5 FL (ref 79–97)
MONOCYTES # BLD: 0.17 10*3/MM3 (ref 0.1–0.9)
MRSA DNA SPEC QL NAA+PROBE: NEGATIVE
NEUTROPHILS # BLD AUTO: 8.18 10*3/MM3 (ref 1.7–7)
NEUTROPHILS NFR BLD MANUAL: 89 % (ref 42.7–76)
NEUTS BAND NFR BLD MANUAL: 5 % (ref 0–5)
NRBC SPEC MANUAL: 0 /100 WBC (ref 0–0.2)
OVALOCYTES BLD QL SMEAR: ABNORMAL
PHOSPHATE SERPL-MCNC: 3.5 MG/DL (ref 2.5–4.5)
PLAT MORPH BLD: NORMAL
PLATELET # BLD AUTO: 141 10*3/MM3 (ref 140–450)
PMV BLD AUTO: 10.7 FL (ref 6–12)
POTASSIUM SERPL-SCNC: 4.1 MMOL/L (ref 3.5–5.2)
PROT SERPL-MCNC: 6.7 G/DL (ref 6–8.5)
QT INTERVAL: 414 MS
QT INTERVAL: 462 MS
QTC INTERVAL: 512 MS
QTC INTERVAL: 523 MS
RBC # BLD AUTO: 3.18 10*6/MM3 (ref 3.77–5.28)
S PNEUM AG SPEC QL LA: NEGATIVE
SODIUM SERPL-SCNC: 141 MMOL/L (ref 136–145)
TIBC SERPL-MCNC: 429 MCG/DL (ref 298–536)
TRANSFERRIN SERPL-MCNC: 288 MG/DL (ref 200–360)
TROPONIN T % DELTA: -8
TROPONIN T NUMERIC DELTA: -5 NG/L
VARIANT LYMPHS NFR BLD MANUAL: 4 % (ref 19.6–45.3)
VIT B12 BLD-MCNC: 773 PG/ML (ref 211–946)
WBC MORPH BLD: NORMAL
WBC NRBC COR # BLD AUTO: 8.7 10*3/MM3 (ref 3.4–10.8)

## 2025-02-07 PROCEDURE — 97162 PT EVAL MOD COMPLEX 30 MIN: CPT

## 2025-02-07 PROCEDURE — 87899 AGENT NOS ASSAY W/OPTIC: CPT | Performed by: STUDENT IN AN ORGANIZED HEALTH CARE EDUCATION/TRAINING PROGRAM

## 2025-02-07 PROCEDURE — 93010 ELECTROCARDIOGRAM REPORT: CPT | Performed by: INTERNAL MEDICINE

## 2025-02-07 PROCEDURE — 82272 OCCULT BLD FECES 1-3 TESTS: CPT | Performed by: STUDENT IN AN ORGANIZED HEALTH CARE EDUCATION/TRAINING PROGRAM

## 2025-02-07 PROCEDURE — 80053 COMPREHEN METABOLIC PANEL: CPT | Performed by: STUDENT IN AN ORGANIZED HEALTH CARE EDUCATION/TRAINING PROGRAM

## 2025-02-07 PROCEDURE — 83540 ASSAY OF IRON: CPT | Performed by: STUDENT IN AN ORGANIZED HEALTH CARE EDUCATION/TRAINING PROGRAM

## 2025-02-07 PROCEDURE — 86738 MYCOPLASMA ANTIBODY: CPT | Performed by: STUDENT IN AN ORGANIZED HEALTH CARE EDUCATION/TRAINING PROGRAM

## 2025-02-07 PROCEDURE — 99223 1ST HOSP IP/OBS HIGH 75: CPT | Performed by: STUDENT IN AN ORGANIZED HEALTH CARE EDUCATION/TRAINING PROGRAM

## 2025-02-07 PROCEDURE — 85007 BL SMEAR W/DIFF WBC COUNT: CPT | Performed by: STUDENT IN AN ORGANIZED HEALTH CARE EDUCATION/TRAINING PROGRAM

## 2025-02-07 PROCEDURE — 85027 COMPLETE CBC AUTOMATED: CPT | Performed by: STUDENT IN AN ORGANIZED HEALTH CARE EDUCATION/TRAINING PROGRAM

## 2025-02-07 PROCEDURE — 87205 SMEAR GRAM STAIN: CPT | Performed by: STUDENT IN AN ORGANIZED HEALTH CARE EDUCATION/TRAINING PROGRAM

## 2025-02-07 PROCEDURE — 94799 UNLISTED PULMONARY SVC/PX: CPT

## 2025-02-07 PROCEDURE — 93306 TTE W/DOPPLER COMPLETE: CPT | Performed by: INTERNAL MEDICINE

## 2025-02-07 PROCEDURE — 84100 ASSAY OF PHOSPHORUS: CPT | Performed by: STUDENT IN AN ORGANIZED HEALTH CARE EDUCATION/TRAINING PROGRAM

## 2025-02-07 PROCEDURE — 82607 VITAMIN B-12: CPT | Performed by: STUDENT IN AN ORGANIZED HEALTH CARE EDUCATION/TRAINING PROGRAM

## 2025-02-07 PROCEDURE — 82746 ASSAY OF FOLIC ACID SERUM: CPT | Performed by: STUDENT IN AN ORGANIZED HEALTH CARE EDUCATION/TRAINING PROGRAM

## 2025-02-07 PROCEDURE — 87070 CULTURE OTHR SPECIMN AEROBIC: CPT | Performed by: STUDENT IN AN ORGANIZED HEALTH CARE EDUCATION/TRAINING PROGRAM

## 2025-02-07 PROCEDURE — 93306 TTE W/DOPPLER COMPLETE: CPT

## 2025-02-07 PROCEDURE — 82728 ASSAY OF FERRITIN: CPT | Performed by: STUDENT IN AN ORGANIZED HEALTH CARE EDUCATION/TRAINING PROGRAM

## 2025-02-07 PROCEDURE — 93005 ELECTROCARDIOGRAM TRACING: CPT | Performed by: PEDIATRICS

## 2025-02-07 PROCEDURE — 83735 ASSAY OF MAGNESIUM: CPT | Performed by: STUDENT IN AN ORGANIZED HEALTH CARE EDUCATION/TRAINING PROGRAM

## 2025-02-07 PROCEDURE — 84466 ASSAY OF TRANSFERRIN: CPT | Performed by: STUDENT IN AN ORGANIZED HEALTH CARE EDUCATION/TRAINING PROGRAM

## 2025-02-07 PROCEDURE — 87641 MR-STAPH DNA AMP PROBE: CPT | Performed by: STUDENT IN AN ORGANIZED HEALTH CARE EDUCATION/TRAINING PROGRAM

## 2025-02-07 RX ORDER — CALCIUM CARBONATE 500 MG/1
1 TABLET, CHEWABLE ORAL 2 TIMES DAILY PRN
Status: DISCONTINUED | OUTPATIENT
Start: 2025-02-07 | End: 2025-02-08 | Stop reason: HOSPADM

## 2025-02-07 RX ORDER — AMOXICILLIN 250 MG
2 CAPSULE ORAL 2 TIMES DAILY PRN
Status: DISCONTINUED | OUTPATIENT
Start: 2025-02-07 | End: 2025-02-08 | Stop reason: HOSPADM

## 2025-02-07 RX ORDER — DOXYCYCLINE 100 MG/1
100 CAPSULE ORAL EVERY 12 HOURS SCHEDULED
Status: DISCONTINUED | OUTPATIENT
Start: 2025-02-08 | End: 2025-02-07

## 2025-02-07 RX ORDER — ONDANSETRON 2 MG/ML
4 INJECTION INTRAMUSCULAR; INTRAVENOUS EVERY 6 HOURS PRN
Status: DISCONTINUED | OUTPATIENT
Start: 2025-02-07 | End: 2025-02-08 | Stop reason: HOSPADM

## 2025-02-07 RX ORDER — HYDRALAZINE HYDROCHLORIDE 20 MG/ML
10 INJECTION INTRAMUSCULAR; INTRAVENOUS EVERY 6 HOURS PRN
Status: DISCONTINUED | OUTPATIENT
Start: 2025-02-07 | End: 2025-02-08 | Stop reason: HOSPADM

## 2025-02-07 RX ORDER — FUROSEMIDE 40 MG/1
40 TABLET ORAL ONCE
Status: COMPLETED | OUTPATIENT
Start: 2025-02-07 | End: 2025-02-07

## 2025-02-07 RX ORDER — HYDROXYZINE HYDROCHLORIDE 25 MG/1
25 TABLET, FILM COATED ORAL 3 TIMES DAILY PRN
Status: DISCONTINUED | OUTPATIENT
Start: 2025-02-07 | End: 2025-02-08 | Stop reason: HOSPADM

## 2025-02-07 RX ORDER — NITROGLYCERIN 0.4 MG/1
0.4 TABLET SUBLINGUAL
Status: DISCONTINUED | OUTPATIENT
Start: 2025-02-07 | End: 2025-02-08 | Stop reason: HOSPADM

## 2025-02-07 RX ORDER — FAMOTIDINE 20 MG/1
40 TABLET, FILM COATED ORAL DAILY
Status: DISCONTINUED | OUTPATIENT
Start: 2025-02-07 | End: 2025-02-08 | Stop reason: HOSPADM

## 2025-02-07 RX ORDER — ASPIRIN 81 MG/1
81 TABLET ORAL DAILY
Status: DISCONTINUED | OUTPATIENT
Start: 2025-02-07 | End: 2025-02-08 | Stop reason: HOSPADM

## 2025-02-07 RX ORDER — FUROSEMIDE 40 MG/1
40 TABLET ORAL
Status: DISPENSED | OUTPATIENT
Start: 2025-02-07 | End: 2025-02-07

## 2025-02-07 RX ORDER — PETROLATUM,WHITE
OINTMENT IN PACKET (GRAM) TOPICAL 4 TIMES DAILY
Status: DISCONTINUED | OUTPATIENT
Start: 2025-02-07 | End: 2025-02-08 | Stop reason: HOSPADM

## 2025-02-07 RX ORDER — ACETAMINOPHEN 325 MG/1
650 TABLET ORAL EVERY 4 HOURS PRN
Status: DISCONTINUED | OUTPATIENT
Start: 2025-02-07 | End: 2025-02-08 | Stop reason: HOSPADM

## 2025-02-07 RX ORDER — ATORVASTATIN CALCIUM 40 MG/1
80 TABLET, FILM COATED ORAL DAILY
Status: DISCONTINUED | OUTPATIENT
Start: 2025-02-07 | End: 2025-02-08 | Stop reason: HOSPADM

## 2025-02-07 RX ORDER — LEVOTHYROXINE SODIUM 75 UG/1
75 TABLET ORAL DAILY
Status: DISCONTINUED | OUTPATIENT
Start: 2025-02-07 | End: 2025-02-08 | Stop reason: HOSPADM

## 2025-02-07 RX ORDER — BISACODYL 10 MG
10 SUPPOSITORY, RECTAL RECTAL DAILY PRN
Status: DISCONTINUED | OUTPATIENT
Start: 2025-02-07 | End: 2025-02-08 | Stop reason: HOSPADM

## 2025-02-07 RX ORDER — POLYETHYLENE GLYCOL 3350 17 G/17G
17 POWDER, FOR SOLUTION ORAL DAILY PRN
Status: DISCONTINUED | OUTPATIENT
Start: 2025-02-07 | End: 2025-02-08 | Stop reason: HOSPADM

## 2025-02-07 RX ORDER — BISACODYL 5 MG/1
5 TABLET, DELAYED RELEASE ORAL DAILY PRN
Status: DISCONTINUED | OUTPATIENT
Start: 2025-02-07 | End: 2025-02-08 | Stop reason: HOSPADM

## 2025-02-07 RX ORDER — ALUMINA, MAGNESIA, AND SIMETHICONE 2400; 2400; 240 MG/30ML; MG/30ML; MG/30ML
15 SUSPENSION ORAL EVERY 6 HOURS PRN
Status: DISCONTINUED | OUTPATIENT
Start: 2025-02-07 | End: 2025-02-08 | Stop reason: HOSPADM

## 2025-02-07 RX ADMIN — LEVOTHYROXINE SODIUM 75 MCG: 0.07 TABLET ORAL at 05:38

## 2025-02-07 RX ADMIN — Medication: at 18:37

## 2025-02-07 RX ADMIN — SACUBITRIL AND VALSARTAN 1 TABLET: 49; 51 TABLET, FILM COATED ORAL at 11:01

## 2025-02-07 RX ADMIN — ACETAMINOPHEN 650 MG: 325 TABLET ORAL at 23:11

## 2025-02-07 RX ADMIN — FAMOTIDINE 40 MG: 20 TABLET, FILM COATED ORAL at 08:44

## 2025-02-07 RX ADMIN — SACUBITRIL AND VALSARTAN 1 TABLET: 49; 51 TABLET, FILM COATED ORAL at 20:14

## 2025-02-07 RX ADMIN — APIXABAN 2.5 MG: 2.5 TABLET, FILM COATED ORAL at 20:14

## 2025-02-07 RX ADMIN — FUROSEMIDE 40 MG: 40 TABLET ORAL at 08:44

## 2025-02-07 RX ADMIN — Medication: at 20:17

## 2025-02-07 RX ADMIN — APIXABAN 2.5 MG: 2.5 TABLET, FILM COATED ORAL at 08:44

## 2025-02-07 RX ADMIN — ASPIRIN 81 MG: 81 TABLET, COATED ORAL at 08:44

## 2025-02-07 RX ADMIN — FUROSEMIDE 40 MG: 40 TABLET ORAL at 20:14

## 2025-02-07 RX ADMIN — ATORVASTATIN CALCIUM 80 MG: 40 TABLET, FILM COATED ORAL at 08:44

## 2025-02-07 NOTE — PROGRESS NOTES
[facility] Medicine Services  ADMISSION FOLLOW-UP NOTE          Patient admitted after midnight, H&P by my partner performed earlier on today's date reviewed.  Interim findings, labs, and charting also reviewed.        The [facility] Hospital Problem List has been managed and updated to include any new diagnoses:  Active Hospital Problems    Diagnosis  POA    **Acute on chronic systolic CHF (congestive heart failure) [I50.23]  Yes    PAF (paroxysmal atrial fibrillation) [I48.0]  Yes    History of cardioembolic cerebrovascular accident (CVA) [Z86.73]  Not Applicable    Mixed hyperlipidemia [E78.2]  Yes    Iron deficiency anemia [D50.9]  Yes    Acute respiratory failure with hypoxia [J96.01]  Yes    CKD stage 3b, GFR 30-44 ml/min [N18.32]  Yes    Hypothyroidism [E03.9]  Yes    Primary hypertension [I10]  Yes      Resolved Hospital Problems   No resolved problems to display.     Patient is doing better today.  Feels that her breathing is improved.  Complains about recurrent epistaxis but states that she does not like to have any types of things in her nose and frequently will blow her nose.    On exam has a 3 out of 6 holosystolic murmur present, crackles present in her bases bilaterally.  Was able to wean from 4 L down to 2 L during my exam.    ADDITIONAL PLAN:  - detailed assessment and plan from admission reviewed      Chronic systolic and diastolic CHF  - Acute decompensation  - CXR revealed findings of pulmonary edema  -Give 1 more dose of IV Lasix and will reassess in a.m.  -Repeat echo today with EF of 60% with mild concentric hypertrophy.  She has mild aortic valve stenosis, mild to moderate mitral valve regurg and worsening tricuspid regurgitation.  Increased right ventricular systolic pressure.  - Strict I/Os, daily weights, 2gm Na diet, 1500 fluid restriction  - Continue GDMT as tolerated     Acute respiratory failure with hypoxemia  - Secondary to decompensated CHF, questionable  pneumonia   - Continue with management per #1  - Flu/Covid negative  -Workup for infection so far is negative and confirmed with family that there has been no infectious symptoms.  Will go ahead and stop the antibiotics.     CKD 3b  - BUN/Cr. 33/1.67 (baseline 1.5-1.6)  - Avoid nephrotoxins, monitor renal function     SELINA  - Chronic, no evidence of acute bleeding, HD stable  - Hgb 10  - Check anemia panel, stool occult (patient reports hx of hemorrhoids)     HTN  - Chronic, controlled  - Resume home amlodipine, coreg when appropriate  - C/w IV Hydralazine PRN     Hypothyroidism  - Chronic  - C/w Synthroid     PAF  - Chronic, rate-controlled in SR  - C/w Eliquis, coreg  -Discussed Vaseline and hydration of nadirs and to avoid picking to prevent epistaxis     History of CVA  - Chronic, no residual deficits  - C/w aspirin, statin     GERD  - Chronic  - C/w Pepcid     HLD  - Chronic  - C/w statin    Expected Discharge  Expected Discharge Date: 2/10/2025; Expected Discharge Time:      Reina Solomon MD  02/07/25

## 2025-02-07 NOTE — ED PROVIDER NOTES
[place]    EMERGENCY DEPARTMENT ENCOUNTER      Pt Name: Sylvia Jalloh  MRN: [id number]  YOB: 1934  Date of evaluation: 2/6/2025  Provider: Hood Mendoza MD    CHIEF COMPLAINT       Chief Complaint   Patient presents with    Shortness of Breath         HISTORY OF PRESENT ILLNESS   Sylvia Jalloh is a 89 y.o. female who presents to the emergency department with complaint of sudden onset difficulty breathing that occurred this evening.  Patient noted to be saturating at 70% on EMS arrival, 91% on nonrebreather.  She was tachycardic during transport.  She has a history of CHF.  Denies any chest pain, abdominal pain, vomiting, or diarrhea today.      Nursing notes were reviewed.    REVIEW OF SYSTEMS     ROS:  A chief complaint appropriate review of systems was completed and is negative except as noted in the HPI.      PAST MEDICAL HISTORY     Past Medical History:   Diagnosis Date    Chronic kidney disease     Disease of thyroid gland     Gallstone pancreatitis 2015    Hypertension     Systolic heart failure secondary to idiopathic cardiomyopathy          SURGICAL HISTORY       Past Surgical History:   Procedure Laterality Date    APPENDECTOMY      BLADDER SUSPENSION      CARDIAC CATHETERIZATION N/A 7/29/2017    Procedure: Coronary angiography;  Surgeon: Thiago Vega MD;  Location: [facility] INVASIVE LOCATION;  Service:     CHOLECYSTECTOMY      HYSTERECTOMY           CURRENT MEDICATIONS     No current facility-administered medications for this encounter.    Current Outpatient Medications:     acetaminophen (TYLENOL) 325 MG tablet, Take 2 tablets by mouth Every 6 (Six) Hours As Needed for Mild Pain., Disp: , Rfl:     amLODIPine (NORVASC) 2.5 MG tablet, Take 1 tablet by mouth Daily. Per patient, take two tabs daily, Disp: , Rfl:     apixaban (ELIQUIS) 2.5 MG tablet tablet, Take 1 tablet by mouth 2 (Two) Times a Day., Disp: , Rfl:     aspirin 81 MG EC tablet, Take 1 tablet by mouth Daily.,  Disp: 30 tablet, Rfl: 11    atorvastatin (LIPITOR) 80 MG tablet, Take 1 tablet by mouth Daily., Disp: , Rfl:     carvedilol (COREG) 12.5 MG tablet, Take 1 tablet by mouth 2 (Two) Times a Day With Meals., Disp: , Rfl:     ENTRESTO 49-51 MG tablet, Take 1 tablet by mouth 2 (Two) Times a Day., Disp: 180 tablet, Rfl: 3    famotidine (PEPCID) 40 MG tablet, Take 1 tablet by mouth Daily., Disp: , Rfl:     furosemide (LASIX) 20 MG tablet, Take 1 tablet by mouth Daily., Disp: , Rfl:     levothyroxine (SYNTHROID, LEVOTHROID) 75 MCG tablet, Take 1 tablet by mouth Daily., Disp: , Rfl:     loratadine (Claritin) 10 MG tablet, Take 1 tablet by mouth Daily., Disp: , Rfl:     Multiple Vitamins-Minerals (MULTIVITAMIN ADULTS 50+) tablet, Take 1 tablet by mouth Daily., Disp: , Rfl:     spironolactone (ALDACTONE) 25 MG tablet, Take 0.5 tablets by mouth Daily., Disp: , Rfl:     dapagliflozin (Farxiga) 5 MG tablet tablet, Take 1 tablet by mouth Daily., Disp: , Rfl:     hydrOXYzine (ATARAX) 25 MG tablet, Take 1 tablet by mouth 3 (Three) Times a Day As Needed for Anxiety., Disp: 20 tablet, Rfl: 0    meclizine (ANTIVERT) 25 MG tablet, Take 1 tablet by mouth Every 6 (Six) Hours As Needed for dizziness., Disp: 20 tablet, Rfl: 0    ondansetron ODT (ZOFRAN-ODT) 4 MG disintegrating tablet, Take 1 tablet by mouth Every 8 (Eight) Hours As Needed for Nausea or Vomiting., Disp: 6 tablet, Rfl: 0    Vitamin D, Cholecalciferol, 1000 UNITS capsule, Take 1 capsule by mouth Daily., Disp: , Rfl:     ALLERGIES     Ace inhibitors, Latex, and Amoxicillin-pot clavulanate    FAMILY HISTORY       Family History   Problem Relation Age of Onset    Heart disease Mother     Heart disease Father     Early death Sister     Heart disease Sister     Heart disease Brother     Cancer Daughter     Heart disease Maternal Grandmother           SOCIAL HISTORY       Social History     Socioeconomic History    Marital status:    Tobacco Use    Smoking status: Never      Passive exposure: Never    Smokeless tobacco: Never   Vaping Use    Vaping status: Never Used   Substance and Sexual Activity    Alcohol use: No    Drug use: No    Sexual activity: Defer         PHYSICAL EXAM    (up to 7 for level 4, 8 or more for level 5)     Vitals:    02/08/25 0243 02/08/25 0742 02/08/25 0817 02/08/25 1119   BP: 154/61 161/65  150/62   BP Location: Left arm Left arm  Left arm   Patient Position: Lying Lying  Lying   Pulse: 64 60 63 63   Resp: 18 18  18   Temp: 97.5 °F (36.4 °C) 97.5 °F (36.4 °C)  97.9 °F (36.6 °C)   TempSrc: Oral Oral  Oral   SpO2: 94%      Weight:       Height:           General: Moderate distress  HEENT: Conjunctivae normal.  Neck: Trachea midline.  Cardiac: Heart regular rate, rhythm, no murmurs, rubs, or gallops  Lungs: Tachypneic, diffusely coarse  Chest wall: There is no tenderness to palpation over the chest wall or over ribs  Abdomen: Abdomen is soft, nontender, nondistended. There are no firm or pulsatile masses, no rebound rigidity or guarding.   Musculoskeletal: No deformity.  Neuro: Alert   Dermatology: Skin is warm and dry  Psych: Mentation is grossly normal, cognition is grossly normal. Affect is appropriate.        DIAGNOSTIC RESULTS     EKG: All EKGs are interpreted by the Emergency Department Physician who either signs or Co-signs this chart in the absence of a cardiologist.    ECG 12 Lead Rhythm Change   Final Result   Test Reason : Rhythm Change   Blood Pressure :   */*   mmHG   Vent. Rate :  74 BPM     Atrial Rate :  74 BPM      P-R Int : 192 ms          QRS Dur : 150 ms       QT Int : 462 ms       P-R-T Axes :  72 -48  64 degrees     QTcB Int : 512 ms      Sinus rhythm with premature supraventricular complexes   Left axis deviation   Left bundle branch block   Abnormal ECG   When compared with ECG of 06-Feb-2025 22:49, (Unconfirmed)   premature ventricular complexes are no longer present   Confirmed by MD Beckie, Domingo ([id number]) on 2/7/2025 12:25:38 PM       Referred By:            Confirmed By: Domingo Butts MD      ECG 12 Lead ED Triage Standing Order; SOA   Preliminary Result   Test Reason : ED Triage Standing Order~   Blood Pressure :   */*   mmHG   Vent. Rate :  96 BPM     Atrial Rate :  85 BPM      P-R Int : 182 ms          QRS Dur : 152 ms       QT Int : 414 ms       P-R-T Axes :  47 -31  98 degrees     QTcB Int : 523 ms      Sinus rhythm with premature supraventricular complexes and premature   ventricular complexes or fusion complexes   Left axis deviation   Left bundle branch block   Abnormal ECG   When compared with ECG of 22-Jul-2024 11:10,   fusion complexes are now present   premature ventricular complexes are now present   premature supraventricular complexes are now present      Referred By:            Confirmed By:             RADIOLOGY:   [x] Radiologist's Report Reviewed:  XR Chest 1 View   Final Result   Impression:   1. Central pulmonary vascular congestion with interstitial thickening suggesting pulmonary edema.   2. Bibasilar airspace disease may relate to atelectasis and/or pneumonia with small bilateral pleural effusions.               Electronically Signed: Jermaine Colby MD     2/6/2025 11:07 PM EST     Workstation ID: [id number]          I ordered and independently reviewed the above noted radiographic studies.        LABS:    I have reviewed and interpreted all of the currently available lab results from this visit (if applicable):  Results for orders placed or performed during the hospital encounter of 02/06/25   Comprehensive Metabolic Panel    Collection Time: 02/06/25 10:47 PM    Specimen: Blood   Result Value Ref Range    Glucose 188 (H) 65 - 99 mg/dL    BUN 33 (H) 8 - 23 mg/dL    Creatinine 1.67 (H) 0.57 - 1.00 mg/dL    Sodium 144 136 - 145 mmol/L    Potassium 4.0 3.5 - 5.2 mmol/L    Chloride 109 (H) 98 - 107 mmol/L    CO2 20.0 (L) 22.0 - 29.0 mmol/L    Calcium 8.9 8.2 - 9.6 mg/dL    Total Protein 6.9 6.0 - 8.5 g/dL    Albumin 3.9 3.5 -  5.2 g/dL    ALT (SGPT) 20 1 - 33 U/L    AST (SGOT) 34 (H) 1 - 32 U/L    Alkaline Phosphatase 281 (H) 39 - 117 U/L    Total Bilirubin 1.5 (H) 0.0 - 1.2 mg/dL    Globulin 3.0 gm/dL    A/G Ratio 1.3 g/dL    BUN/Creatinine Ratio 19.8 7.0 - 25.0    Anion Gap 15.0 5.0 - 15.0 mmol/L    eGFR 29.0 (L) >60.0 mL/min/1.73   BNP    Collection Time: 02/06/25 10:47 PM    Specimen: Blood   Result Value Ref Range    proBNP 4,769.0 (H) 0.0 - 1,800.0 pg/mL   High Sensitivity Troponin T    Collection Time: 02/06/25 10:47 PM    Specimen: Blood   Result Value Ref Range    HS Troponin T 63 (C) <14 ng/L   CBC Auto Differential    Collection Time: 02/06/25 10:47 PM    Specimen: Blood   Result Value Ref Range    WBC 15.61 (H) 3.40 - 10.80 10*3/mm3    RBC 3.32 (L) 3.77 - 5.28 10*6/mm3    Hemoglobin 10.0 (L) 12.0 - 15.9 g/dL    Hematocrit 30.6 (L) 34.0 - 46.6 %    MCV 92.2 79.0 - 97.0 fL    MCH 30.1 26.6 - 33.0 pg    MCHC 32.7 31.5 - 35.7 g/dL    RDW 14.1 12.3 - 15.4 %    RDW-SD 47.7 37.0 - 54.0 fl    MPV 11.0 6.0 - 12.0 fL    Platelets 148 140 - 450 10*3/mm3    Neutrophil % 82.9 (H) 42.7 - 76.0 %    Lymphocyte % 7.8 (L) 19.6 - 45.3 %    Monocyte % 8.2 5.0 - 12.0 %    Eosinophil % 0.3 0.3 - 6.2 %    Basophil % 0.4 0.0 - 1.5 %    Immature Grans % 0.4 0.0 - 0.5 %    Neutrophils, Absolute 12.94 (H) 1.70 - 7.00 10*3/mm3    Lymphocytes, Absolute 1.21 0.70 - 3.10 10*3/mm3    Monocytes, Absolute 1.28 (H) 0.10 - 0.90 10*3/mm3    Eosinophils, Absolute 0.04 0.00 - 0.40 10*3/mm3    Basophils, Absolute 0.07 0.00 - 0.20 10*3/mm3    Immature Grans, Absolute 0.07 (H) 0.00 - 0.05 10*3/mm3    nRBC 0.0 0.0 - 0.2 /100 WBC   Lactic Acid, Plasma    Collection Time: 02/06/25 10:47 PM    Specimen: Blood   Result Value Ref Range    Lactate 1.7 0.5 - 2.0 mmol/L   Procalcitonin    Collection Time: 02/06/25 10:47 PM    Specimen: Blood   Result Value Ref Range    Procalcitonin 0.12 0.00 - 0.25 ng/mL   Green Top (Gel)    Collection Time: 02/06/25 10:47 PM   Result Value  Ref Range    Extra Tube Hold for add-ons.    Lavender Top    Collection Time: 02/06/25 10:47 PM   Result Value Ref Range    Extra Tube hold for add-on    Gold Top - SST    Collection Time: 02/06/25 10:47 PM   Result Value Ref Range    Extra Tube Hold for add-ons.    Gray Top    Collection Time: 02/06/25 10:47 PM   Result Value Ref Range    Extra Tube Hold for add-ons.    Light Blue Top    Collection Time: 02/06/25 10:47 PM   Result Value Ref Range    Extra Tube Hold for add-ons.    ECG 12 Lead ED Triage Standing Order; SOA    Collection Time: 02/06/25 10:49 PM   Result Value Ref Range    QT Interval 414 ms    QTC Interval 523 ms   COVID-19 and FLU A/B PCR, 1 HR TAT - Swab, Nasopharynx    Collection Time: 02/06/25 10:51 PM    Specimen: Nasopharynx; Swab   Result Value Ref Range    COVID19 Not Detected Not Detected - Ref. Range    Influenza A PCR Not Detected Not Detected    Influenza B PCR Not Detected Not Detected   Blood Culture - Blood, Arm, Left    Collection Time: 02/06/25 10:58 PM    Specimen: Arm, Left; Blood   Result Value Ref Range    Blood Culture No growth at 2 days    Blood Culture - Blood, Arm, Left    Collection Time: 02/06/25 10:59 PM    Specimen: Arm, Left; Blood   Result Value Ref Range    Blood Culture No growth at 2 days    Blood Gas, Arterial With Co-Ox    Collection Time: 02/06/25 11:08 PM    Specimen: Arterial Blood   Result Value Ref Range    Site Left Radial     Lonnie's Test Positive     pH, Arterial 7.448 7.350 - 7.450 pH units    pCO2, Arterial 31.7 (L) 35.0 - 45.0 mm Hg    pO2, Arterial 70.9 (L) 83.0 - 108.0 mm Hg    HCO3, Arterial 21.9 20.0 - 26.0 mmol/L    Base Excess, Arterial -1.6 (L) 0.0 - 2.0 mmol/L    Hemoglobin, Blood Gas 9.7 (L) 14 - 18 g/dL    Hematocrit, Blood Gas 29.6 (L) 38.0 - 51.0 %    Oxyhemoglobin 93.8 (L) 94 - 99 %    Methemoglobin -0.10 (L) 0.00 - 1.50 %    Carboxyhemoglobin 1.7 0 - 2 %    CO2 Content 22.8 22 - 33 mmol/L    Temperature 37.0     Barometric Pressure for  Blood Gas      Modality BiPap     FIO2 50 %    Rate 0 Breaths/minute    PIP 0 cmH2O    IPAP 0     EPAP 0     pH, Temp Corrected 7.448 pH Units    pCO2, Temperature Corrected 31.7 (L) 35 - 45 mm Hg    pO2, Temperature Corrected 70.9 (L) 83 - 108 mm Hg   High Sensitivity Troponin T 1Hr    Collection Time: 02/06/25 11:50 PM    Specimen: Blood   Result Value Ref Range    HS Troponin T 58 (C) <14 ng/L    Troponin T Numeric Delta -5 ng/L    Troponin T % Delta -8 Abnormal if >/= 20%   MRSA Screen, PCR (Inpatient) - Swab, Nares    Collection Time: 02/07/25  2:53 AM    Specimen: Nares; Swab   Result Value Ref Range    MRSA PCR Negative Negative   S. Pneumo Ag Urine or CSF - Urine, Urine, Clean Catch    Collection Time: 02/07/25  5:44 AM    Specimen: Urine, Clean Catch   Result Value Ref Range    Strep Pneumo Ag Negative Negative   Adult Transthoracic Echo Complete With Contrast if Necessary Per Protocol    Collection Time: 02/07/25 10:36 AM   Result Value Ref Range    EF(MOD-bp) 57.9 %    LVIDd 3.4 cm    LVIDs 2.6 cm    IVSd 1.20 cm    LVPWd 1.00 cm    FS 23.5 %    IVS/LVPW 1.20 cm    ESV(cubed) 17.6 ml    EDV(cubed) 39.3 ml    LV mass(C)d 114.0 grams    LVOT area 3.1 cm2    LVOT diam 2.00 cm    EDV(MOD-sp2) 166.0 ml    EDV(MOD-sp4) 121.0 ml    ESV(MOD-sp2) 60.3 ml    ESV(MOD-sp4) 58.9 ml    SV(MOD-sp2) 105.7 ml    SV(MOD-sp4) 62.1 ml    EF(MOD-sp2) 63.7 %    EF(MOD-sp4) 51.3 %    MV E max karel 124.0 cm/sec    MV A max karel 103.0 cm/sec    MV dec time 0.18 sec    MV E/A 1.20     LA ESV Index (BP) 63.8 ml/m2    Med Peak E' Karel 7.2 cm/sec    Lat Peak E' Karel 9.8 cm/sec    TR max karel 319 cm/sec    Avg E/e' ratio 14.59     SV(LVOT) 89.5 ml    RV Base 4.8 cm    RV Mid 3.2 cm    RV Length 5.3 cm    TAPSE (>1.6) 2.14 cm    RV S' 18.2 cm/sec    LA dimension (2D)  4.2 cm    LV V1 max 113.0 cm/sec    LV V1 max PG 5.1 mmHg    LV V1 mean PG 3.0 mmHg    LV V1 VTI 28.5 cm    Ao pk karel 255.5 cm/sec    Ao max PG 26.1 mmHg    Ao mean PG 14.3  mmHg    Ao V2 VTI 60.9 cm    GEOVANNI(I,D) 1.47 cm2    MV max PG 10.0 mmHg    MV mean PG 3.5 mmHg    MV V2 VTI 43.5 cm    MV P1/2t 81.0 msec    MVA(P1/2t) 2.7 cm2    MVA(VTI) 2.06 cm2    MV dec slope 477.5 cm/sec2    MR max dilan 545.0 cm/sec    MR max .8 mmHg    MR mean dilan 418.0 cm/sec    MR mean PG 77.0 mmHg    MR .0 cm    TR max PG 41 mmHg    PA acc time 0.12 sec    PI end-d dilan 132.0 cm/sec    Ao root diam 3.5 cm    Echo EF Estimated 60.0 %    RVSP(TR) 46 mmHg    RAP systole 5 mmHg   Comprehensive Metabolic Panel    Collection Time: 02/07/25 11:26 AM    Specimen: Blood   Result Value Ref Range    Glucose 200 (H) 65 - 99 mg/dL    BUN 36 (H) 8 - 23 mg/dL    Creatinine 1.67 (H) 0.57 - 1.00 mg/dL    Sodium 141 136 - 145 mmol/L    Potassium 4.1 3.5 - 5.2 mmol/L    Chloride 106 98 - 107 mmol/L    CO2 22.0 22.0 - 29.0 mmol/L    Calcium 9.1 8.2 - 9.6 mg/dL    Total Protein 6.7 6.0 - 8.5 g/dL    Albumin 3.8 3.5 - 5.2 g/dL    ALT (SGPT) 16 1 - 33 U/L    AST (SGOT) 25 1 - 32 U/L    Alkaline Phosphatase 245 (H) 39 - 117 U/L    Total Bilirubin 1.0 0.0 - 1.2 mg/dL    Globulin 2.9 gm/dL    A/G Ratio 1.3 g/dL    BUN/Creatinine Ratio 21.6 7.0 - 25.0    Anion Gap 13.0 5.0 - 15.0 mmol/L    eGFR 29.0 (L) >60.0 mL/min/1.73   Magnesium    Collection Time: 02/07/25 11:26 AM    Specimen: Blood   Result Value Ref Range    Magnesium 2.0 1.6 - 2.4 mg/dL   Phosphorus    Collection Time: 02/07/25 11:26 AM    Specimen: Blood   Result Value Ref Range    Phosphorus 3.5 2.5 - 4.5 mg/dL   Iron Profile    Collection Time: 02/07/25 11:26 AM    Specimen: Blood   Result Value Ref Range    Iron 22 (L) 37 - 145 mcg/dL    Iron Saturation (TSAT) 5 (L) 20 - 50 %    Transferrin 288 200 - 360 mg/dL    TIBC 429 298 - 536 mcg/dL   Ferritin    Collection Time: 02/07/25 11:26 AM    Specimen: Blood   Result Value Ref Range    Ferritin 56.55 13.00 - 150.00 ng/mL   Manual Differential    Collection Time: 02/07/25 11:26 AM    Specimen: Blood   Result Value  Ref Range    Neutrophil % 89.0 (H) 42.7 - 76.0 %    Lymphocyte % 4.0 (L) 19.6 - 45.3 %    Monocyte % 2.0 (L) 5.0 - 12.0 %    Eosinophil % 0.0 (L) 0.3 - 6.2 %    Basophil % 0.0 0.0 - 1.5 %    Bands %  5.0 0.0 - 5.0 %    Neutrophils Absolute 8.18 (H) 1.70 - 7.00 10*3/mm3    Lymphocytes Absolute 0.35 (L) 0.70 - 3.10 10*3/mm3    Monocytes Absolute 0.17 0.10 - 0.90 10*3/mm3    Eosinophils Absolute 0.00 0.00 - 0.40 10*3/mm3    Basophils Absolute 0.00 0.00 - 0.20 10*3/mm3    nRBC 0.0 0.0 - 0.2 /100 WBC    Ovalocytes Slight/1+ None Seen    WBC Morphology Normal Normal    Platelet Morphology Normal Normal   CBC Auto Differential    Collection Time: 02/07/25 11:26 AM    Specimen: Blood   Result Value Ref Range    WBC 8.70 3.40 - 10.80 10*3/mm3    RBC 3.18 (L) 3.77 - 5.28 10*6/mm3    Hemoglobin 9.4 (L) 12.0 - 15.9 g/dL    Hematocrit 29.4 (L) 34.0 - 46.6 %    MCV 92.5 79.0 - 97.0 fL    MCH 29.6 26.6 - 33.0 pg    MCHC 32.0 31.5 - 35.7 g/dL    RDW 13.9 12.3 - 15.4 %    RDW-SD 47.8 37.0 - 54.0 fl    MPV 10.7 6.0 - 12.0 fL    Platelets 141 140 - 450 10*3/mm3   Folate    Collection Time: 02/07/25 11:26 AM    Specimen: Blood   Result Value Ref Range    Folate >20.00 4.78 - 24.20 ng/mL   Vitamin B12    Collection Time: 02/07/25 11:26 AM    Specimen: Blood   Result Value Ref Range    Vitamin B-12 773 211 - 946 pg/mL   ECG 12 Lead Rhythm Change    Collection Time: 02/07/25 11:54 AM   Result Value Ref Range    QT Interval 462 ms    QTC Interval 512 ms   Occult Blood X 1, Stool - Stool, Per Rectum    Collection Time: 02/07/25  3:55 PM    Specimen: Per Rectum; Stool   Result Value Ref Range    Fecal Occult Blood Negative Negative   Respiratory Culture - Sputum, Cough    Collection Time: 02/07/25  6:16 PM    Specimen: Cough; Sputum   Result Value Ref Range    Respiratory Culture       Light growth (2+) Normal respiratory cassandra. No S. aureus or Pseudomonas aeruginosa detected. Final report.    Gram Stain Few (2+) WBCs per low power field      Gram Stain Rare (1+) Epithelial cells per low power field     Gram Stain Few (2+) Gram negative bacilli     Gram Stain Few (2+) Gram positive cocci         If labs were ordered, I independently reviewed the results and considered them in treating the patient.      EMERGENCY DEPARTMENT COURSE and DIFFERENTIAL DIAGNOSIS/MDM:   Vitals:  AS OF 22:44 EST    BP - 150/62  HR - 63  TEMP - 97.9 °F (36.6 °C) (Oral)  O2 SATS - 94%        Discussion below represents my analysis of pertinent findings related to patient's condition, differential diagnosis, treatment plan and final disposition.      Differential diagnosis:  The differential diagnosis associated with the patient's presentation includes: CHF, COPD, pneumonia, pneumothorax, pulmonary embolism, ACS, dysrhythmia, anemia      Independent interpretations (ECG/rhythm strip/X-ray/US/CT scan): I independently interpreted the patient's chest x-ray and cardiac monitor.  Evidence of volume overload and patient is in sinus rhythm.      ED Course:    ED Course as of 02/09/25 2244   Fri Feb 07, 2025 0117 This patient presents with acute respiratory failure with CHF exacerbation and possible underlying pneumonia. Reports feeling unwell for the last couple of days with productive cough. Was awoken from sleep tonight with significantly increased work of breathing. Per EMS, patient was saturating in the 70s on arrival, required nonrebreather during transport. Was on nonrebreather on arrival and was placed on BiPAP with significant improvement in symptoms. proBNP is elevated, looks volume overloaded on chest x-ray, has a white count of 15, COVID and flu swabs negative. Remainder of labs appear stable compared to baseline. Have given her a dose of IV Lasix and ordered Rocephin and doxycycline. She currently feels much better, is currently on 10/5 at 40% FiO2. [NS]   0117 Discussed case with hospitalist Dr. Bledsoe.  Discussed history, presentation, workup.  Accepts patient for  admission. [NS]      ED Course User Index  [NS] Hood Mendoza MD         CRITICAL CARE TIME    Approximately 35 minutes of discontinuous critical care time was provided to this patient by myself absent of any time spent performing procedures.  Patient presents critically ill with acute exacerbation of CHF with associated acute hypoxic respiratory failure placing the cardiovascular, respiratory, neurologic, renal systems at risk requiring the following interventions: BiPAP therapy, IV diuretics, interpretation of lab/ECG/imaging, frequent reassessment, coordination of admission with the following response: Resolution of hypoxia.  Patient at high risk of deterioration and possibly death without these interventions.      FINAL IMPRESSION      1. Acute respiratory failure with hypoxia    2. Acute on chronic congestive heart failure, unspecified heart failure type    3. Chronic kidney disease, unspecified CKD stage    4. History of hypertension          DISPOSITION/PLAN     ED Disposition       ED Disposition   Decision to Admit    Condition   --    Comment   Level of Care: Telemetry [5]   Diagnosis: Acute respiratory failure [518.81.ICD-9-CM]   Admitting Physician: CRISTINA KAUFFMAN [[id number]]   Attending Physician: CRISTINA KAUFFMAN [[id number]]   Certification: I Certify That Inpatient Hospital Services Are Medically Necessary For Greater Than 2 Midnights                   Comment: Please note this report has been produced using speech recognition software.      Hood Mendoza MD  Attending Emergency Physician             Hood Mendoza MD  02/09/25 [id number]

## 2025-02-07 NOTE — THERAPY EVALUATION
Patient Name: Sylvia Jalloh  : 1934    MRN: [id number]                              Today's Date: 2025       Admit Date: 2025    Visit Dx:     ICD-10-CM ICD-9-CM   1. Acute respiratory failure with hypoxia  J96.01 518.81   2. Acute on chronic congestive heart failure, unspecified heart failure type  I50.9 428.0   3. Chronic kidney disease, unspecified CKD stage  N18.9 585.9   4. History of hypertension  Z86.79 V12.59     Patient Active Problem List   Diagnosis    Orthopnea    Primary hypertension    Hypothyroidism    Bundle branch block, left    CKD stage 3b, GFR 30-44 ml/min    Acute on chronic systolic CHF (congestive heart failure)    Non-ischemic cardiomyopathy    Acute respiratory failure with hypoxia    Acute hypoxemic respiratory failure    TIA (transient ischemic attack)    Acute respiratory failure    PAF (paroxysmal atrial fibrillation)    History of cardioembolic cerebrovascular accident (CVA)    Mixed hyperlipidemia    Iron deficiency anemia     Past Medical History:   Diagnosis Date    Chronic kidney disease     Disease of thyroid gland     Gallstone pancreatitis     Hypertension     Systolic heart failure secondary to idiopathic cardiomyopathy      Past Surgical History:   Procedure Laterality Date    APPENDECTOMY      BLADDER SUSPENSION      CARDIAC CATHETERIZATION N/A 2017    Procedure: Coronary angiography;  Surgeon: Thiago Vega MD;  Location: [facility] CATH INVASIVE LOCATION;  Service:     CHOLECYSTECTOMY      HYSTERECTOMY        General Information       Row Name 25 1534          Physical Therapy Time and Intention    Document Type evaluation  -AB     Mode of Treatment physical therapy  -AB       Row Name 25 1534          General Information    Patient Profile Reviewed yes  -AB     Prior Level of Function independent:;all household mobility;gait;transfer;bed mobility;ADL's;community mobility  Hx of CVA w/ minimal residual deficits. Denies falls or AD use.   -AB     Existing Precautions/Restrictions fall;oxygen therapy device and L/min  -AB     Barriers to Rehab medically complex  -AB       Row Name 02/07/25 1534          Living Environment    People in Home alone;other (see comments)  Dtr & Granddtr reside close by and sister visits regularly  -AB       Row Name 02/07/25 1534          Home Main Entrance    Number of Stairs, Main Entrance two  -AB     Stair Railings, Main Entrance railing on left side (ascending)  -AB       Row Name 02/07/25 1534          Stairs Within Home, Primary    Number of Stairs, Within Home, Primary none  -AB       Row Name 02/07/25 1534          Cognition    Orientation Status (Cognition) oriented x 3  -AB       Row Name 02/07/25 1534          Safety Issues/Impairments Affecting Functional Mobility    Safety Issues Affecting Function (Mobility) awareness of need for assistance;insight into deficits/self-awareness;safety precaution awareness;safety precautions follow-through/compliance  -AB     Impairments Affecting Function (Mobility) endurance/activity tolerance;strength;shortness of breath;balance  -AB               User Key  (r) = Recorded By, (t) = Taken By, (c) = Cosigned By      Initials Name Provider Type    AB Leana Bermudez PT Physical Therapist                   Mobility       Row Name 02/07/25 1537          Bed Mobility    Bed Mobility supine-sit;scooting/bridging  -AB     Scooting/Bridging [place] (Bed Mobility) contact guard  -AB     Supine-Sit [place] (Bed Mobility) contact guard;1 person assist;verbal cues  -AB     Assistive Device (Bed Mobility) head of bed elevated  -AB     Comment, (Bed Mobility) increased time/effort.  -AB       Row Name 02/07/25 1537          Transfers    Comment, (Transfers) Cues for hand placement and sequencing.  -AB       Row Name 02/07/25 1537          Sit-Stand Transfer    Sit-Stand [place] (Transfers) contact guard;1 person assist;verbal cues  -AB       Row Name 02/07/25 1537           Gait/Stairs (Locomotion)    [place] Level (Gait) contact guard;1 person assist;verbal cues  -AB     Patient was able to Ambulate yes  -AB     Distance in Feet (Gait) 60  -AB     Deviations/Abnormal Patterns (Gait) bilateral deviations;earlene decreased;gait speed decreased;stride length decreased  -AB     Bilateral Gait Deviations forward flexed posture;heel strike decreased  -AB     [place] (Stairs) unable to assess  -AB     Comment, (Gait/Stairs) Pt ambulated with step through gait pattern at slowed pace. She demonstrates a significant kypotic posture with difficulty extending neck, this is baseline. Cues provided for PLB and forward gaze. No overt LOB or knee buckling. O2 sat >90% on 2L. Further activity limited by SOA.  -AB               User Key  (r) = Recorded By, (t) = Taken By, (c) = Cosigned By      Initials Name Provider Type    AB Leana Bermudez, PT Physical Therapist                   Obj/Interventions       Row Name 02/07/25 1540          Range of Motion Comprehensive    General Range of Motion bilateral lower extremity ROM WFL  -AB       Row Name 02/07/25 1540          Strength Comprehensive (MMT)    General Manual Muscle Testing (MMT) Assessment lower extremity strength deficits identified  -AB     Comment, General Manual Muscle Testing (MMT) Assessment BLE grossly 4-/5  -AB       Row Name 02/07/25 1540          Balance    Balance Assessment sitting static balance;standing dynamic balance;standing static balance;sitting dynamic balance  -AB     Static Sitting Balance standby assist  -AB     Dynamic Sitting Balance standby assist  -AB     Position, Sitting Balance unsupported;sitting edge of bed  -AB     Static Standing Balance contact guard  -AB     Dynamic Standing Balance contact guard;1-person assist;verbal cues  -AB     Position/Device Used, Standing Balance supported;walker, front-wheeled  -AB     Balance Interventions sitting;standing;sit to  stand;supported;static;dynamic;occupation based/functional task  -AB     Comment, Balance No overt LOB.  -AB       Row Name 02/07/25 1540          Sensory Assessment (Somatosensory)    Sensory Assessment (Somatosensory) LE sensation intact  -AB               User Key  (r) = Recorded By, (t) = Taken By, (c) = Cosigned By      Initials Name Provider Type    AB Leana Bermudez, PT Physical Therapist                   Goals/Plan       Row Name 02/07/25 1546          Bed Mobility Goal 1 (PT)    Activity/Assistive Device (Bed Mobility Goal 1, PT) sit to supine;supine to sit  -AB     [place] Level/Cues Needed (Bed Mobility Goal 1, PT) independent  -AB     Time Frame (Bed Mobility Goal 1, PT) short term goal (STG);5 days  -AB       Row Name 02/07/25 1546          Transfer Goal 1 (PT)    Activity/Assistive Device (Transfer Goal 1, PT) sit-to-stand/stand-to-sit;bed-to-chair/chair-to-bed;walker, rolling  -AB     [place] Level/Cues Needed (Transfer Goal 1, PT) modified independence  -AB     Time Frame (Transfer Goal 1, PT) long term goal (LTG);10 days  -AB       Row Name 02/07/25 1546          Gait Training Goal 1 (PT)    Activity/Assistive Device (Gait Training Goal 1, PT) gait (walking locomotion);assistive device use;walker, rolling  -AB     [place] Level (Gait Training Goal 1, PT) modified independence  -AB     Distance (Gait Training Goal 1, PT) 150  -AB     Time Frame (Gait Training Goal 1, PT) long term goal (LTG);10 days  -AB       Row Name 02/07/25 1546          Stairs Goal 1 (PT)    Activity/Assistive Device (Stairs Goal 1, PT) ascending stairs;descending stairs;using handrail, left  -AB     [place] Level/Cues Needed (Stairs Goal 1, PT) contact guard required  -AB     Number of Stairs (Stairs Goal 1, PT) 2  -AB     Time Frame (Stairs Goal 1, PT) long term goal (LTG);10 days  -AB       Row Name 02/07/25 1546          Therapy Assessment/Plan (PT)    Planned Therapy Interventions (PT) balance  training;bed mobility training;gait training;home exercise program;patient/family education;postural re-education;transfer training;stretching;strengthening;ROM (range of motion);stair training  -AB               User Key  (r) = Recorded By, (t) = Taken By, (c) = Cosigned By      Initials Name Provider Type    AB Leana Bermudez, PT Physical Therapist                   Clinical Impression       Row Name 02/07/25 1542          Pain    Pretreatment Pain Rating 0/10 - no pain  -AB     Posttreatment Pain Rating 0/10 - no pain  -AB       Row Name 02/07/25 1542          Plan of Care Review    Plan of Care Reviewed With patient;child  -AB     Progress no change  -AB     Outcome Evaluation PT initial eval completed. Pt presents below baseline with SOA, decreased endurance, and weakness. She ambulated 60' with CGA and no AD. Further IPPT is warrented. PT rec d/c home with initial 24/7 assist and HHPT when medically appropriate.  -AB       Row Name 02/07/25 1542          Therapy Assessment/Plan (PT)    Patient/Family Therapy Goals Statement (PT) go home  -AB     Rehab Potential (PT) good  -AB     Criteria for Skilled Interventions Met (PT) yes;meets criteria;skilled treatment is necessary  -AB     Therapy Frequency (PT) daily  -AB     Predicted Duration of Therapy Intervention (PT) 10 days  -AB       Row Name 02/07/25 1542          Vital Signs    Pre Systolic BP Rehab 129  -AB     Pre Treatment Diastolic BP 56  -AB     Post Systolic BP Rehab 145  -AB     Post Treatment Diastolic BP 52  -AB     Pre SpO2 (%) 92  -AB     O2 Delivery Pre Treatment room air  -AB     O2 Delivery Intra Treatment room air  -AB     Post SpO2 (%) 98  -AB     O2 Delivery Post Treatment room air  -AB     Pre Patient Position Supine  -AB     Intra Patient Position Standing  -AB     Post Patient Position Sitting  -AB       Row Name 02/07/25 1542          Positioning and Restraints    Pre-Treatment Position in bed  -AB     Post Treatment Position chair  -AB      In Chair notified nsg;reclined;sitting;call light within reach;encouraged to call for assist;exit alarm on;legs elevated;waffle cushion;with family/caregiver  -AB               User Key  (r) = Recorded By, (t) = Taken By, (c) = Cosigned By      Initials Name Provider Type    AB Leana Bermudez, PT Physical Therapist                   Outcome Measures       Row Name 02/07/25 1547 02/07/25 0803       How much help from another person do you currently need...    Turning from your back to your side while in flat bed without using bedrails? 3  -AB 3  -AN    Moving from lying on back to sitting on the side of a flat bed without bedrails? 3  -AB 3  -AN    Moving to and from a bed to a chair (including a wheelchair)? 3  -AB 2  -AN    Standing up from a chair using your arms (e.g., wheelchair, bedside chair)? 3  -AB 2  -AN    Climbing 3-5 steps with a railing? 2  -AB 2  -AN    To walk in hospital room? 3  -AB 2  -AN    AM-PAC 6 Clicks Score (PT) 17  -AB 14  -AN    Highest Level of Mobility Goal 5 --> Static standing  -AB 4 --> Transfer to chair/commode  -AN      Row Name 02/07/25 1547          Functional Assessment    Outcome Measure Options AM-PAC 6 Clicks Basic Mobility (PT)  -AB               User Key  (r) = Recorded By, (t) = Taken By, (c) = Cosigned By      Initials Name Provider Type    AB Leana Bermudez, PT Physical Therapist    Aimee Felix RN Registered Nurse                                 Physical Therapy Education       Title: PT OT SLP Therapies (In Progress)       Topic: Physical Therapy (In Progress)       Point: Mobility training (Done)       Learning Progress Summary            Patient Acceptance, E,D, VU,NR by AB at 2/7/2025 5578                      Point: Home exercise program (Not Started)       Learner Progress:  Not documented in this visit.              Point: Body mechanics (Done)       Learning Progress Summary            Patient Acceptance, E,D, VU,NR by AB at 2/7/2025 4242                       Point: Precautions (Done)       Learning Progress Summary            Patient Acceptance, E,D, VU,NR by AB at 2/7/2025 1548                                      User Key       Initials Effective Dates Name Provider Type Discipline    AB 09/22/22 -  Leana Bermudez, PT Physical Therapist PT                  PT Recommendation and Plan  Planned Therapy Interventions (PT): balance training, bed mobility training, gait training, home exercise program, patient/family education, postural re-education, transfer training, stretching, strengthening, ROM (range of motion), stair training  Progress: no change  Outcome Evaluation: PT initial eval completed. Pt presents below baseline with SOA, decreased endurance, and weakness. She ambulated 60' with CGA and no AD. Further IPPT is warrented. PT rec d/c home with initial 24/7 assist and HHPT when medically appropriate.     Time Calculation:   PT Evaluation Complexity  History, PT Evaluation Complexity: 1-2 personal factors and/or comorbidities  Examination of Body Systems (PT Eval Complexity): total of 3 or more elements  Clinical Presentation (PT Evaluation Complexity): evolving  Clinical Decision Making (PT Evaluation Complexity): moderate complexity  Overall Complexity (PT Evaluation Complexity): moderate complexity     PT Charges       Row Name 02/07/25 1548             Time Calculation    Start Time 1441  -AB      PT Received On 02/07/25  -AB      PT Goal Re-Cert Due Date 02/17/25  -AB         Untimed Charges    PT Eval/Re-eval Minutes 50  -AB         Total Minutes    Untimed Charges Total Minutes 50  -AB       Total Minutes 50  -AB                User Key  (r) = Recorded By, (t) = Taken By, (c) = Cosigned By      Initials Name Provider Type    AB Leana Bermudez, PT Physical Therapist                  Therapy Charges for Today       Code Description Service Date Service Provider Modifiers Qty    44670997511 HC PT EVAL MOD COMPLEXITY 4 2/7/2025 Leana Bermudez, PT GP 1     [id number]  PT THER SUPP EA 15 MIN 2/7/2025 Leana Bermudez, PT GP 2            PT G-Codes  Outcome Measure Options: AM-PAC 6 Clicks Basic Mobility (PT)  AM-PAC 6 Clicks Score (PT): 17  PT Discharge Summary  Anticipated Discharge Disposition (PT): home with 24/7 care, home with home health    Leana Bermudez, PT  2/7/2025

## 2025-02-07 NOTE — PLAN OF CARE
Goal Outcome Evaluation: progressing  Problem: Adult Inpatient Plan of Care  Goal: Plan of Care Review  Outcome: Progressing  Flowsheets  Taken 2/7/2025 1815 by Aimee Hollingsworth RN  Progress: improving  Outcome Evaluation: (patient was up with nursing today to the bathroom and back, vss, alert&oriented x4, no complains this shift) --  Taken 2/7/2025 1542 by Leana Bermudez PT  Plan of Care Reviewed With:   patient   child  Goal: Patient-Specific Goal (Individualized)  Outcome: Progressing  Goal: Absence of Hospital-Acquired Illness or Injury  Outcome: Progressing  Intervention: Identify and Manage Fall Risk  Description: Perform standard risk assessment on admission using a validated tool or comprehensive approach appropriate to the patient; reassess fall risk frequently, with change in status or transfer to another level of care.  Communicate risk to interprofessional healthcare team; ensure fall risk visible cue.  Determine need for increased observation, equipment and environmental modification, as well as use of supportive, nonskid footwear.  Adjust safety measures to individual needs and identified risk factors.  Reinforce the importance of active participation with fall risk prevention, safety, and physical activity with the patient and family.  Perform regular intentional rounding to assess need for position change, pain assessment and personal needs, including assistance with toileting.  Recent Flowsheet Documentation  Taken 2/7/2025 1800 by Aimee Hollingsworth, DENISSE  Safety Promotion/Fall Prevention:   activity supervised   assistive device/personal items within reach   clutter free environment maintained   elopement precautions  Taken 2/7/2025 1400 by Aimee Hollingsworth, RN  Safety Promotion/Fall Prevention:   safety round/check completed   lighting adjusted   nonskid shoes/slippers when out of bed  Taken 2/7/2025 1200 by Aimee Hollingsworth, RN  Safety Promotion/Fall Prevention:   gait belt   lighting adjusted  Taken 2/7/2025  1000 by Aimee Hollingsworth RN  Safety Promotion/Fall Prevention:   clutter free environment maintained   lighting adjusted  Taken 2/7/2025 0803 by Aimee Hollingsworth RN  Safety Promotion/Fall Prevention:   assistive device/personal items within reach   clutter free environment maintained   gait belt   lighting adjusted   mobility aid in reach  Intervention: Prevent Skin Injury  Description: Perform a screening for skin injury risk, such as pressure or moisture-associated skin damage on admission and at regular intervals throughout hospital stay.  Keep all areas of skin (especially folds) clean and dry.  Maintain adequate skin hydration.  Relieve and redistribute pressure and protect bony prominences and skin at risk for injury; implement measures based on patient-specific risk factors.  Match turning and repositioning schedule to clinical condition.  Encourage weight shift frequently; assist with reposition if unable to complete independently.  Float heels off bed; avoid pressure on the Achilles tendon.  Keep skin free from extended contact with medical devices.  Optimize nutrition and hydration.  Encourage functional activity and mobility, as early as tolerated.  Use aids (e.g., slide boards, mechanical lift) during transfer.  Recent Flowsheet Documentation  Taken 2/7/2025 1400 by Aimee Hollingsworth RN  Skin Protection:   transparent dressing maintained   incontinence pads utilized  Taken 2/7/2025 1200 by Aimee Hollingsworth RN  Skin Protection:   incontinence pads utilized   silicone foam dressing in place  Taken 2/7/2025 1000 by Aimee Hollingsworth RN  Skin Protection:   incontinence pads utilized   silicone foam dressing in place  Taken 2/7/2025 0803 by Aimee Hollingsworth RN  Body Position: supine  Skin Protection:   silicone foam dressing in place   incontinence pads utilized  Intervention: Prevent and Manage VTE (Venous Thromboembolism) Risk  Description: Assess for VTE (venous thromboembolism) risk.  Promote early mobilization; encourage  both active and passive leg exercises, if unable to ambulate.  Initiate and maintain compression or other therapy, as indicated, based on identified risk in accordance with organizational protocol and provider order.  Recognize the patient's individual risk for bleeding before initiating pharmacologic thromboprophylaxis.  Recent Flowsheet Documentation  Taken 2/7/2025 1400 by Aimee Hollingsworth RN  VTE Prevention/Management: SCDs (sequential compression devices) on  Taken 2/7/2025 1200 by Aimee Hollingsworth RN  VTE Prevention/Management: SCDs (sequential compression devices) on  Taken 2/7/2025 0803 by Aimee Hollingsworth RN  VTE Prevention/Management: SCDs (sequential compression devices) off  Intervention: Prevent Infection  Description: Maintain skin and mucous membrane integrity; promote hand, oral and pulmonary hygiene.  Optimize fluid balance, nutrition, sleep and glycemic control to maximize infection resistance.  Identify potential sources of infection early to prevent or mitigate progression of infection (e.g., wound, lines, devices).  Evaluate ongoing need for invasive devices; remove promptly when no longer indicated.  Review vaccination status.  Recent Flowsheet Documentation  Taken 2/7/2025 1800 by Aimee Hollingsworth RN  Infection Prevention:   environmental surveillance performed   rest/sleep promoted   hand hygiene promoted  Taken 2/7/2025 1400 by Aimee Hollingsworth RN  Infection Prevention:   environmental surveillance performed   hand hygiene promoted  Taken 2/7/2025 1200 by Aimee Hollingsworth RN  Infection Prevention:   environmental surveillance performed   single patient room provided  Taken 2/7/2025 1000 by Aimee Hollingsworth RN  Infection Prevention:   environmental surveillance performed   rest/sleep promoted   single patient room provided  Taken 2/7/2025 0803 by Aimee Hollingsworth RN  Infection Prevention:   single patient room provided   rest/sleep promoted   environmental surveillance performed  Goal: Optimal Comfort and  Wellbeing  Outcome: Progressing  Intervention: Provide Person-Centered Care  Description: Use a family-focused approach to care; encourage support system presence and participation.  Develop trust and rapport by proactively providing information, encouraging questions, addressing concerns and offering reassurance.  Acknowledge emotional response to hospitalization.  Recognize and utilize personal coping strategies and strengths; develop goals via shared decision-making.  Honor spiritual and cultural preferences.  Recent Flowsheet Documentation  Taken 2/7/2025 1800 by Aimee Hollingsworth RN  Trust Relationship/Rapport:   care explained   choices provided   emotional support provided  Taken 2/7/2025 1200 by Aimee Hollingsworth RN  Trust Relationship/Rapport:   care explained   choices provided   thoughts/feelings acknowledged   reassurance provided  Taken 2/7/2025 1000 by Aimee Hollingsworth RN  Trust Relationship/Rapport: reassurance provided  Taken 2/7/2025 0803 by Aimee Hollingsworth RN  Trust Relationship/Rapport: reassurance provided  Goal: Readiness for Transition of Care  Outcome: Progressing     Problem: Heart Failure  Goal: Optimal Coping  Outcome: Progressing  Intervention: Support Psychosocial Response  Description: Acknowledge, normalize and validate the emotional response to current condition and unpredictable nature of disease progression.  Assess and monitor patient and caregiver perspective on quality of life and personal wellbeing; consider palliative care consult to enhance symptom relief and quality of life.  Engage patient in early and ongoing discussion about goals of care. Facilitate shared decision-making regarding goals and advanced care planning.  Empower participation in planning care and activities, as well as development of attainable goals, to increase self-esteem and feelings of control.  Assist patient and family with life transitions associated with heart failure (e.g., medical regimens, impact on family  dynamics and roles, end-of-life care); acknowledge caregiver stress and encourage social connection.  Recognize current coping strategies and assist in developing new strategies (e.g., stress management, mind-body techniques, mindfulness).  Assess and monitor for signs and symptoms of anxiety and depression.  Connect with community resources for ongoing support, such as cardiac rehabilitation, cognitive behavioral therapy and case management.  Recent Flowsheet Documentation  Taken 2/7/2025 1200 by Aimee Hollingsworth RN  Supportive Measures:   active listening utilized   self-care encouraged  Family/Support System Care:   support provided   self-care encouraged  Taken 2/7/2025 1000 by Aimee Hollingsworth RN  Family/Support System Care:   support provided   self-care encouraged  Taken 2/7/2025 0803 by Aimee Hollingsworth RN  Supportive Measures: self-care encouraged  Family/Support System Care:   support provided   self-care encouraged  Goal: Optimal Cardiac Output and Blood Flow  Outcome: Progressing  Intervention: Optimize Cardiac Output  Description: Closely monitor fluid balance; advocate for adjustment if balance is consistently positive.  Optimize preload, afterload and contractility with pharmacologic therapy; monitor effect on cardiac function and manage therapy adjustment as needed.  Implement pharmacologic and nonpharmacologic interventions for pain, comfort and anxiety to avoid cardiac and respiratory compromise.  Maintain optimal position to promote venous return, such as avoiding dependent extremity position.  Facilitate sleep/rest patterns that support or enhance activity tolerance.  Decrease energy expenditure (e.g., preplan activity, cluster care considering patient preference).  Maintain normothermia to enhance oxygenation and perfusion.  Facilitate use of additional therapy, such as ultrafiltration, intra-aortic balloon pump, ventricular assist device or extracorporeal membrane oxygenation to improve perfusion and  hemodynamic stability.  Anticipate surgical intervention, such as heart surgery or heart transplantation, when refractory symptoms are present.  Recent Flowsheet Documentation  Taken 2/7/2025 1200 by Aimee Hollingsworth RN  Environmental Support: calm environment promoted  Taken 2/7/2025 0803 by Aimee Hollingsworth RN  Environmental Support: calm environment promoted  Goal: Stable Heart Rate and Rhythm  Outcome: Progressing  Goal: Fluid and Electrolyte Balance  Outcome: Progressing  Intervention: Monitor and Manage Fluid and Electrolyte Balance  Description: Assess fluid requirements to determine fluid therapy strategy.  Keep accurate intake, output and daily weight; monitor trends.  Monitor laboratory value trends; anticipate the need for intravenous or oral electrolyte replacement, binding therapy or restriction.  Evaluate causes and potential sources that may lead to imbalance, such as illness severity, organ failure, mental status, mobility limitations, swallowing difficulties, intravenous fluid and medication side effects.  Assess need for ongoing intravenous fluid therapy; encourage oral intake when able.  Anticipate need for diuretic agent; monitor effects if administered (e.g., blood pressure change, dysrhythmia, electrolyte alteration).  Avoid excessive fluid intake or delivery; evaluate need for, and response to, fluid restriction in individualized clinical situations.  Maintain optimal position to relieve discomfort, breathlessness and ventilation-perfusion mismatch.  Monitor and maintain skin integrity; avoid constrictive devices and clothing if edema is present.  Monitor ECG (electrocardiogram) for changes that may indicate fluctuation in serum electrolyte levels, such as calcium and potassium.  Recent Flowsheet Documentation  Taken 2/7/2025 0803 by Aimee Hollingsworth RN  Fluid/Electrolyte Management: fluids restricted  Goal: Optimal Functional Ability  Outcome: Progressing  Intervention: Optimize Functional  Ability  Description: Assess functional ability, such as ADL (activities of daily living), mobility safety and independence; involve patient and caregiver/family in goal-setting.  Facilitate physical activity and exercise to improve functional ability, cognition and quality of life, as well as to minimize functional decline associated with inactivity; encourage optimal functional mobility.  Consider NMES (neuromuscular electrical stimulation) of the lower extremities for patients with limitations in ability to participate in active exercise.  Monitor physiologic response to activity or exercise and adjust accordingly; encourage and support gradual increase in activity level.  Cluster, coordinate and organize care schedule per patient preference, priorities and tolerance.  Preplan and pace activity; balance activity with periods of rest; incorporate energy-conservation techniques.  Maintain an accessible environment to facilitate safe activity; position for optimal comfort and activity tolerance (e.g., sitting for self-care).  Encourage self-care performance to promote maximum independence in daily activity; provide adaptive equipment and rest periods if needed.  Identify and address body system and performance deficits affecting function, such as cognitive, balance, sensorimotor, activity tolerance, hearing and visual perception impairments.  Recent Flowsheet Documentation  Taken 2/7/2025 1800 by Aimee Hollingsworth RN  Activity Management: ambulated in room  Taken 2/7/2025 1200 by Aimee Hollingsworth, RN  Activity Management: activity encouraged  Taken 2/7/2025 0803 by Aimee Hollingsworth RN  Activity Management: activity encouraged  Goal: Improved Oral Intake  Outcome: Progressing  Intervention: Promote and Optimize Nutrition Intake  Description: Perform a nutritional assessment; include a nutrition-focused physical exam.  Determine calorie, protein, vitamin, mineral and fluid requirements.  Assess for micronutrient deficiencies,  such as iron; supplement if depleted.  Assess need for, and assist with, meal set-up and feeding.  Adjust diet or meal schedule based on preferences and tolerance.  Minimize unnecessary dietary restrictions to increase oral intake; sodium restriction should be individualized to the patient and clinical status.  Offer oral supplemental food or drinks to enhance calorie and protein intake.  Establish bowel elimination program to increase comfort and appetite.  Provide and encourage oral hygiene to enhance desire to eat.  Consider enteral nutrition support if oral intake remains inadequate; provide parenteral nutrition if enteral is contraindicated.  Recent Flowsheet Documentation  Taken 2/7/2025 0803 by Aimee Hollingsworth RN  Oral Nutrition Promotion: rest periods promoted  Nutrition Interventions: meal set-up provided  Goal: Effective Oxygenation and Ventilation  Outcome: Progressing  Intervention: Promote Airway Secretion Clearance  Description: Assess the effectiveness of pulmonary hygiene and ability to perform airway-clearance techniques.  Promote early mobility or ambulation; match activity to ability and tolerance.  Encourage deep breathing and lung expansion therapy to prevent atelectasis; adjust treatment to patient's response.  Initiate cough-enhancement and airway-clearance techniques with instruction.  Consider pharmacologic therapy that may improve mucus clearance, cough response and air flow.  Consider inspiratory muscle training to decrease the risk of pulmonary complications.  Recent Flowsheet Documentation  Taken 2/7/2025 1800 by Aimee Hollingsworth, RN  Activity Management: ambulated in room  Taken 2/7/2025 1200 by Aimee Hollingsworth RN  Activity Management: activity encouraged  Taken 2/7/2025 0803 by Aimee Hollingsworth, DENISSE  Activity Management: activity encouraged  Cough And Deep Breathing: done with encouragement  Intervention: Optimize Oxygenation and Ventilation  Description: Assess and monitor airway, breathing and  circulation for effective oxygenation and ventilation; consider oxygenation and ventilation parameters and goal.  Maintain head of bed elevation with regular position changes to minimize ventilation-perfusion mismatch and breathlessness.  Provide oxygen therapy judiciously to avoid hyperoxemia; adjust to achieve oxygenation goal.  Monitor fluid balance closely to minimize the risk of fluid overload.  Consider positive pressure ventilation to enhance oxygenation and ventilation, as well as reduce work of breathing.  Recent Flowsheet Documentation  Taken 2/7/2025 1200 by Aimee Hollingsworth RN  Airway/Ventilation Management: oxygen therapy provided  Taken 2/7/2025 0803 by Aimee Hollingsworth RN  Head of Bed (HOB) Positioning: HOB elevated  Airway/Ventilation Management: oxygen therapy provided  Goal: Effective Breathing Pattern During Sleep  Outcome: Progressing  Intervention: Monitor and Manage Obstructive Sleep Apnea  Description: Use a validated screening tool to identify risk for LLUVIA (obstructive sleep apnea).  Implement continuous pulse oximetry to detect apnea-related oxygen desaturation events.  Encourage a nonsupine sleep position to prevent airway collapse, such as side-lying or head of bed elevated.  Minimize use of sedative, opioid and benzodiazepine medication that may contribute to respiratory depression.  Provide oxygen therapy during sleep to reduce apnea-related oxygen desaturation events.  Continue use of home prescribed continuous PAP (positive airway pressure) or other non-PAP therapies during sleep; utilize home device and settings if available.  Implement continuous PAP during sleep for high-risk of LLUVIA; consider auto-titrating PAP, humidification and mask choice (including fit and style) to improve comfort and tolerance.  Provide behavioral and supportive interventions individualized to the patient needs and preferences to improve PAP adherence.  Facilitate polysomnography (sleep study) referral if  needed.  Recent Flowsheet Documentation  Taken 2/7/2025 1800 by Aimee Hollingsworth RN  Medication Review/Management: medications reviewed  Taken 2/7/2025 1400 by Aimee Hollingsworth RN  Medication Review/Management: medications reviewed  Taken 2/7/2025 1200 by Aimee Hollingsworth RN  Medication Review/Management: medications reviewed  Taken 2/7/2025 1000 by Aimee Hollingsworth RN  Medication Review/Management: medications reviewed  Taken 2/7/2025 0803 by Aimee Hollingsworth RN  Medication Review/Management: medications reviewed     Problem: Skin Injury Risk Increased  Goal: Skin Health and Integrity  Outcome: Progressing  Intervention: Optimize Skin Protection  Description: Perform a full pressure injury risk assessment, as indicated by screening, upon admission to care unit.  Reassess skin (full inspection and injury risk, including skin temperature, consistency and color) frequently (e.g., scheduled interval, with change in condition) to provide optimal early detection and prevention.  Maintain adequate tissue perfusion (e.g., encourage fluid balance; avoid crossing legs, constrictive clothing or devices) to promote tissue oxygenation.  Maintain head of bed at lowest degree of elevation tolerated, considering medical condition and other restrictions. Use positioning supports to prevent sliding and friction. Consider low friction textiles.  Avoid positioning onto an area that remains reddened or on bony prominences.  Minimize incontinence and moisture (e.g., toileting schedule; moisture-wicking pad, diaper or incontinence collection device; skin moisture barrier).  Cleanse skin promptly and gently, when soiled, utilizing a pH-balanced cleanser.  Relieve and redistribute pressure (e.g., scheduled position changes, weight shifts, use of support surface, medical device repositioning, protective dressing application, use of positioning device, microclimate control, use of pressure-injury-monitor  Encourage increased activity, such as sitting in a  chair at the bedside or early mobilization, when able to tolerate. Avoid prolonged sitting.  Recent Flowsheet Documentation  Taken 2/7/2025 1800 by Aimee Hollingsworth RN  Activity Management: ambulated in room  Taken 2/7/2025 1400 by Aimee Hollingsworth RN  Pressure Reduction Techniques:   frequent weight shift encouraged   weight shift assistance provided  Pressure Reduction Devices:   pressure-redistributing mattress utilized   positioning supports utilized  Skin Protection:   transparent dressing maintained   incontinence pads utilized  Taken 2/7/2025 1200 by Aimee Hollingsworth RN  Activity Management: activity encouraged  Pressure Reduction Techniques:   frequent weight shift encouraged   weight shift assistance provided  Pressure Reduction Devices:   pressure-redistributing mattress utilized   positioning supports utilized  Skin Protection:   incontinence pads utilized   silicone foam dressing in place  Taken 2/7/2025 1000 by Aimee Hollingsworth RN  Pressure Reduction Techniques:   frequent weight shift encouraged   weight shift assistance provided  Pressure Reduction Devices: positioning supports utilized  Skin Protection:   incontinence pads utilized   silicone foam dressing in place  Taken 2/7/2025 0803 by Aimee Hollingsworth RN  Activity Management: activity encouraged  Pressure Reduction Techniques:   weight shift assistance provided   frequent weight shift encouraged  Head of Bed (HOB) Positioning: HOB elevated  Pressure Reduction Devices:   pressure-redistributing mattress utilized   positioning supports utilized  Skin Protection:   silicone foam dressing in place   incontinence pads utilized  Intervention: Promote and Optimize Oral Intake  Description: Perform a nutrition assessment that includes a nutrition-focused physical exam; identify malnutrition risk.  Assess for adequate oral intake; if inadequate, offer oral supplemental food or drinks to enhance calorie and protein intake.  Assess for vitamin and mineral deficiencies;  supplement if depleted.  Assess need for, and assist with, meal set-up and feeding.  Adjust diet or meal schedule based on preferences and tolerance.  Minimize unnecessary dietary restrictions to increase oral intake.  Establish bowel elimination program to increase comfort and appetite.  Provide and encourage oral hygiene to enhance desire to eat.  Consider enteral nutrition support if oral intake remains inadequate; provide parenteral nutrition if enteral is contraindicated.  Recent Flowsheet Documentation  Taken 2/7/2025 0803 by Aimee Hollingsworth RN  Oral Nutrition Promotion: rest periods promoted  Nutrition Interventions: meal set-up provided              Progress: improving  Outcome Evaluation:  (patient was up with nursing today to the bathroom and back, vss, alert&oriented x4, no complains this shift)

## 2025-02-07 NOTE — H&P
[facility] Medicine Services  HISTORY AND PHYSICAL    Patient Name: Sylvia Jalloh  : 1934  MRN: [id number]  Primary Care Physician: Bart Pop APRN  Date of admission: 2025      Subjective   Subjective   Source of Information: Patient, ER signout, EMR    Chief Complaint:   Chief Complaint   Patient presents with    Shortness of Breath       HPI:  Sylvia Jalloh is a 89 y.o. female with PMHx of Chronic systolic & diastolic CHF (EF 51-55%, Grade 1a), NiCMP, PAF on Eliquis, CKD 3b, LBBB, Mild AV stenosis, Hypothyroidism, SELINA, HTN, HLD, GERD who presented for evaluation of respiratory distress. The patient reportedly had been experiencing progressively worsening generalized fatigue, malaise, intermittent productive cough, exertional dyspnea, generalized chest heaviness over the past week. She endorsed increased intake of water and salty foods. She is compliant with her medications. Last night, she was experiencing conversational dyspnea and eventual distress which prompted a medical evaluation. Upon arrival to the ER, the patient's SpO2 was 70% on RA and was placed on rescue BiPAP. The patient's CXR revealed signs of fluid overload and she was given Lasix 80mg IV x1. She was also placed on IV Abx for possible pneumonia (no cultures obtained). Patient denied F/C, abd and back pain, N/V/D, dysuria. During my interview, she did report some improvement of her dyspnea.    Patient was last discharged from  on 24 after being treated for an acute CVA.    Review of systems:  Total 12 point review of systems is negative except as I have mentioned above    Personal History     Past Medical History:   Diagnosis Date    Chronic kidney disease     Disease of thyroid gland     Gallstone pancreatitis 2015    Hypertension     Systolic heart failure secondary to idiopathic cardiomyopathy        Past Surgical History:   Procedure Laterality Date    APPENDECTOMY       BLADDER SUSPENSION      CARDIAC CATHETERIZATION N/A 2017    Procedure: Coronary angiography;  Surgeon: Thiago Vega MD;  Location: [facility] CATH INVASIVE LOCATION;  Service:     CHOLECYSTECTOMY      HYSTERECTOMY         Family History: family history includes Cancer in her daughter; Early death in her sister; Heart disease in her brother, father, maternal grandmother, mother, and sister.     Social History:  reports that she has never smoked. She has never been exposed to tobacco smoke. She has never used smokeless tobacco. She reports that she does not drink alcohol and does not use drugs.  Social History     Social History Narrative    Daughter just  from cancer 2017, lots of stress.  She lives alone now, but has two sisters that check on her often and another daughter that lives a mile away.        Caffeine :0-1 servings per day.       Medications:  Available home medication information reviewed.  Vitamin D (Cholecalciferol), acetaminophen, amLODIPine, apixaban, aspirin, atorvastatin, carvedilol, cetirizine, dapagliflozin, famotidine, furosemide, hydrOXYzine, levothyroxine, loratadine, meclizine, multivitamin with minerals, ondansetron ODT, sacubitril-valsartan, and spironolactone    Allergies   Allergen Reactions    Ace Inhibitors Rash    Latex Rash    Amoxicillin-Pot Clavulanate Nausea And Vomiting       Objective   Objective     Vital Signs:   Temp:  [99.3 °F (37.4 °C)] 99.3 °F (37.4 °C)  Heart Rate:  [73-86] 73  Resp:  [16-23] 18  BP: (142-156)/(57-68) 142/61  Flow (L/min) (Oxygen Therapy):  [15] 15       General:  Well nourished, NAD on BiPAP  Head:  Normocephalic, atraumatic  Eyes:  Sclerae appear normal. Pupils equally round  ENT:  Nares appear normal, no drainage. Moist oral mucosa  Neck:  No restricted ROM. Trachea midline  CV:  RRR. No M/R/G. No JVD  Lungs: Bibasilar rales. Symmetric expansion  Abdomen: Bowel sounds present. Nondistended, soft, nontender  Extremities: No cyanosis or  clubbing. Trace pitting edema BLEs.  Skin:  No rashes and normal coloration. Warm and dry.  Neuro:  CN II-XII grossly intact. Sensation intact.  Psych:  Normal mood and affect    I have personally reviewed labs and tests showing:  Result Review:  I have personally reviewed the results from the time of this admission to 2/7/2025 02:10 EST and agree with these findings:  [x]  Laboratory list / accordion  [x]  Microbiology  [x]  Radiology  [x]  EKG/Telemetry   [x]  Cardiology/Vascular   []  Pathology  [x]  Old records  []  Other:    LAB RESULTS:      Lab 02/06/25 2247   WBC 15.61*   HEMOGLOBIN 10.0*   HEMATOCRIT 30.6*   PLATELETS 148   NEUTROS ABS 12.94*   IMMATURE GRANS (ABS) 0.07*   LYMPHS ABS 1.21   MONOS ABS 1.28*   EOS ABS 0.04   MCV 92.2   PROCALCITONIN 0.12   LACTATE 1.7         Lab 02/06/25  2247   SODIUM 144   POTASSIUM 4.0   CHLORIDE 109*   CO2 20.0*   ANION GAP 15.0   BUN 33*   CREATININE 1.67*   EGFR 29.0*   GLUCOSE 188*   CALCIUM 8.9         Lab 02/06/25  2247   TOTAL PROTEIN 6.9   ALBUMIN 3.9   GLOBULIN 3.0   ALT (SGPT) 20   AST (SGOT) 34*   BILIRUBIN 1.5*   ALK PHOS 281*         Lab 02/06/25  2350 02/06/25  2247   PROBNP  --  4,769.0*   HSTROP T 58* 63*                 Lab 02/06/25  2308   PH, ARTERIAL 7.448   PCO2, ARTERIAL 31.7*   PO2 ART 70.9*   FIO2 50   HCO3 ART 21.9   BASE EXCESS ART -1.6*   CARBOXYHEMOGLOBIN 1.7     UA          7/22/2024    11:46   Urinalysis   Squamous Epithelial Cells, UA None Seen    Specific [employer], UA 1.019    Ketones, UA Negative    Blood, UA Trace    Leukocytes, UA Moderate (2+)    Nitrite, UA Negative    RBC, UA 0-2    WBC, UA 21-50    Bacteria, UA 3+        Microbiology Results (last 10 days)       Procedure Component Value - Date/Time    COVID PRE-OP / PRE-PROCEDURE SCREENING ORDER (NO ISOLATION) - Swab, Nasopharynx [[id number]]  (Normal) Collected: 02/06/25 2251    Lab Status: Final result Specimen: Swab from Nasopharynx Updated: 02/06/25 3726    Narrative:      The  following orders were created for panel order COVID PRE-OP / PRE-PROCEDURE SCREENING ORDER (NO ISOLATION) - Swab, Nasopharynx.  Procedure                               Abnormality         Status                     ---------                               -----------         ------                     COVID-19 and FLU A/B PCR...[[id number]]  Normal              Final result                 Please view results for these tests on the individual orders.    COVID-19 and FLU A/B PCR, 1 HR TAT - Swab, Nasopharynx [[id number]]  (Normal) Collected: 02/06/25 2251    Lab Status: Final result Specimen: Swab from Nasopharynx Updated: 02/06/25 2343     COVID19 Not Detected     Influenza A PCR Not Detected     Influenza B PCR Not Detected    Narrative:      Fact sheet for providers: https://www.fda.gov/media/435956/download    Fact sheet for patients: https://www.fda.gov/media/489086/download    Test performed by PCR.            XR Chest 1 View    Result Date: 2/6/2025  XR CHEST 1 VW Date of Exam: 2/6/2025 10:45 PM EST Indication: SOA triage protocol Comparison: 7/22/2024 Findings: Patient is rotated to the right. There is central pulmonary vascular congestion. Interstitial thickening which suggest pulmonary edema. Bibasilar airspace disease may relate to atelectasis and/or pneumonia with small bilateral pleural effusions. No pneumothorax. Osseous structures grossly intact.     Impression: Impression: 1. Central pulmonary vascular congestion with interstitial thickening suggesting pulmonary edema. 2. Bibasilar airspace disease may relate to atelectasis and/or pneumonia with small bilateral pleural effusions. Electronically Signed: Jermaine Colby MD  2/6/2025 11:07 PM EST  Workstation ID: [id number]     Results for orders placed during the hospital encounter of 07/22/24    Adult Transthoracic Echo Complete W/ Cont if Necessary Per Protocol (With Agitated Saline)    Interpretation Summary    Left ventricular systolic function is low  normal. Left ventricular ejection fraction appears to be 51 - 55%.    Left ventricular wall thickness is consistent with borderline concentric hypertrophy.    Left ventricular diastolic function is consistent with (grade Ia w/high LAP) impaired relaxation.    Left atrial volume is severely increased.    Mild aortic valve stenosis is present. Aortic valve area is 1.1 cm2.    Peak velocity of the flow distal to the aortic valve is 270 cm/s. Aortic valve maximum pressure gradient is 29 mmHg. Aortic valve mean pressure gradient is 16 mmHg.    Estimated right ventricular systolic pressure from tricuspid regurgitation is normal (<35 mmHg). Calculated right ventricular systolic pressure from tricuspid regurgitation is 26 mmHg.    There is a left pleural effusion. There is a right pleural effusion.    Mild to moderate mitral valve regurgitation    EF slightly improved compared to previous study      My personal interpretation of the CXR is central pulmonary vascular congestion, bibasilar opacities, small B/L pleural effusions    My personal interpretation of the EKG is 96 SR with PVCs, LAD, LBBB (old), no ischemic ST abnormalities    This case was discussed with the ER attending physician.    Assessment & Plan   Assessment & Plan       Acute on chronic systolic CHF (congestive heart failure)    Primary hypertension    Hypothyroidism    CKD stage 3b, GFR 30-44 ml/min    Acute respiratory failure with hypoxia    PAF (paroxysmal atrial fibrillation)    History of cardioembolic cerebrovascular accident (CVA)    Mixed hyperlipidemia    Iron deficiency anemia    Chronic systolic and diastolic CHF  - Acute decompensation  - BNP 4769, HsTrop 63 -> 58 (demand ischemia)  - CXR revealed findings of pulmonary edema  - C/w IV Lasix 40mg IV BID & monitor renal function  - Previous Echo revealed EF 51-55%, Gr 1a diastolic dysfunction -- Obtain Echo  - Strict I/Os, daily weights, 2gm Na diet, 1500 fluid restriction  - Continue GDMT as  tolerated  - Further management pending clinical course    Acute respiratory failure with hypoxemia  - Secondary to decompensated CHF, questionable pneumonia   - Afebrile, WBC 15.6  - ABG 7.44/31/70 on BiPAP 50% oxygen  - Continue with management per #1  - Flu/Covid negative  - Lower suspicion for infectious process, however, will continue IV Rocephin/Doxy, check atypicals, sputum culture, and discontinue Abx as appropriate    CKD 3b  - BUN/Cr. 33/1.67 (baseline 1.5-1.6)  - Avoid nephrotoxins, monitor renal function    SELINA  - Chronic, no evidence of acute bleeding, HD stable  - Hgb 10  - Check anemia panel, stool occult (patient reports hx of hemorrhoids)    HTN  - Chronic, controlled  - Resume home amlodipine, coreg when appropriate  - C/w IV Hydralazine PRN    Hypothyroidism  - Chronic  - C/w Synthroid    PAF  - Chronic, rate-controlled in SR  - C/w Eliquis, coreg    History of CVA  - Chronic, no residual deficits  - C/w aspirin, statin    GERD  - Chronic  - C/w Pepcid    HLD  - Chronic  - C/w statin    Diet: Diet: Cardiac, Fluid Restriction (240 mL/tray); Low Sodium (2g); 1500 mL/day; Fluid Consistency: Thin (IDDSI 0)  VTE ppx: Eliquis  CODE STATUS:    Code Status and Medical Interventions: CPR (Attempt to Resuscitate); Full Support   Ordered at: 02/07/25 0235     Code Status (Patient has no pulse and is not breathing):    CPR (Attempt to Resuscitate)     Medical Interventions (Patient has pulse or is breathing):    Full Support       Expected Discharge   Expected Discharge Date: 2/10/2025; Expected Discharge Time:      Total time spent: 80 minutes  Time spent includes time reviewing chart, face-to-face time, counseling patient/family/caregiver, ordering medications/tests/procedures, communicating with other health care professionals, documenting clinical information in the electronic health record, and coordination of care.     Afshin Marshall DO  02/07/25

## 2025-02-07 NOTE — ED NOTES
" Sylvia Jalloh    Nursing Report ED to Floor:  Mental status: a&ox4  Ambulatory status: 2 assist  Oxygen Therapy:  bipap  Cardiac Rhythm: nsr  Admitted from: home  Safety Concerns:  na  Precautions: na  Social Issues: na  ED Room #:  12    ED Nurse Phone Extension - [phone] or may call [phone].      HPI:   Chief Complaint   Patient presents with    Shortness of Breath       Past Medical History:  Past Medical History:   Diagnosis Date    Chronic kidney disease     Disease of thyroid gland     Gallstone pancreatitis 2015    Hypertension     Systolic heart failure secondary to idiopathic cardiomyopathy         Past Surgical History:  Past Surgical History:   Procedure Laterality Date    APPENDECTOMY      BLADDER SUSPENSION      CARDIAC CATHETERIZATION N/A 7/29/2017    Procedure: Coronary angiography;  Surgeon: Thiago Vega MD;  Location: [facility] CATH INVASIVE LOCATION;  Service:     CHOLECYSTECTOMY      HYSTERECTOMY          Admitting Doctor:   Afshin Marshall DO    Consulting Provider(s):  Consults       No orders found for last 30 day(s).             Admitting Diagnosis:   The primary encounter diagnosis was Acute respiratory failure with hypoxia. Diagnoses of Acute on chronic congestive heart failure, unspecified heart failure type, Chronic kidney disease, unspecified CKD stage, and History of hypertension were also pertinent to this visit.    Most Recent Vitals:   Vitals:    02/06/25 2244 02/06/25 2300 02/07/25 0000 02/07/25 0100   BP: 156/68  143/57 147/57   Pulse: 86 78 76 73   Resp: 23  16 18   Temp: 99.3 °F (37.4 °C)      SpO2: 93% 96% 96% 97%   Weight: 49.9 kg (110 lb)      Height: 157.5 cm (62\")          Active LDAs/IV Access:   Lines, Drains & Airways       Active LDAs       Name Placement date Placement time Site Days    Peripheral IV 02/06/25 Anterior;Left Forearm 02/06/25  --  Forearm  1    Peripheral IV 02/06/25 2248 Right Antecubital 02/06/25 2248  Antecubital  less than 1                    Labs " (abnormal labs have a star):   Labs Reviewed   COMPREHENSIVE METABOLIC PANEL - Abnormal; Notable for the following components:       Result Value    Glucose 188 (*)     BUN 33 (*)     Creatinine 1.67 (*)     Chloride 109 (*)     CO2 20.0 (*)     AST (SGOT) 34 (*)     Alkaline Phosphatase 281 (*)     Total Bilirubin 1.5 (*)     eGFR 29.0 (*)     All other components within normal limits    Narrative:     GFR Categories in Chronic Kidney Disease (CKD)      GFR Category          GFR (mL/min/1.73)    Interpretation  G1                     90 or greater         Normal or high (1)  G2                      60-89                Mild decrease (1)  G3a                   45-59                Mild to moderate decrease  G3b                   30-44                Moderate to severe decrease  G4                    15-29                Severe decrease  G5                    14 or less           Kidney failure          (1)In the absence of evidence of kidney disease, neither GFR category G1 or G2 fulfill the criteria for CKD.    eGFR calculation 2021 CKD-EPI creatinine equation, which does not include race as a factor   BNP (IN-HOUSE) - Abnormal; Notable for the following components:    proBNP 4,769.0 (*)     All other components within normal limits    Narrative:     This assay is used as an aid in the diagnosis of individuals suspected of having heart failure. It can be used as an aid in the diagnosis of acute decompensated heart failure (ADHF) in patients presenting with signs and symptoms of ADHF to the emergency department (ED). In addition, NT-proBNP of <300 pg/mL indicates ADHF is not likely.    Age Range Result Interpretation  NT-proBNP Concentration (pg/mL:      <50             Positive            >450                   Gray                 300-450                    Negative             <300    50-75           Positive            >900                  Gray                300-900                  Negative             <300      >75             Positive            >1800                  Gray                300-1800                  Negative            <300   TROPONIN - Abnormal; Notable for the following components:    HS Troponin T 63 (*)     All other components within normal limits    Narrative:     High Sensitive Troponin T Reference Range:  <14.0 ng/L- Negative Female for AMI  <22.0 ng/L- Negative Male for AMI  >=14 - Abnormal Female indicating possible myocardial injury.  >=22 - Abnormal Male indicating possible myocardial injury.   Clinicians would have to utilize clinical acumen, EKG, Troponin, and serial changes to determine if it is an Acute Myocardial Infarction or myocardial injury due to an underlying chronic condition.        CBC WITH AUTO DIFFERENTIAL - Abnormal; Notable for the following components:    WBC 15.61 (*)     RBC 3.32 (*)     Hemoglobin 10.0 (*)     Hematocrit 30.6 (*)     Neutrophil % 82.9 (*)     Lymphocyte % 7.8 (*)     Neutrophils, Absolute 12.94 (*)     Monocytes, Absolute 1.28 (*)     Immature Grans, Absolute 0.07 (*)     All other components within normal limits   BLOOD GAS, ARTERIAL W/CO-OXIMETRY - Abnormal; Notable for the following components:    pCO2, Arterial 31.7 (*)     pO2, Arterial 70.9 (*)     Base Excess, Arterial -1.6 (*)     Hemoglobin, Blood Gas 9.7 (*)     Hematocrit, Blood Gas 29.6 (*)     Oxyhemoglobin 93.8 (*)     Methemoglobin -0.10 (*)     pCO2, Temperature Corrected 31.7 (*)     pO2, Temperature Corrected 70.9 (*)     All other components within normal limits   HIGH SENSITIVITIY TROPONIN T 1HR - Abnormal; Notable for the following components:    HS Troponin T 58 (*)     All other components within normal limits    Narrative:     High Sensitive Troponin T Reference Range:  <14.0 ng/L- Negative Female for AMI  <22.0 ng/L- Negative Male for AMI  >=14 - Abnormal Female indicating possible myocardial injury.  >=22 - Abnormal Male indicating possible myocardial injury.   Clinicians  "would have to utilize clinical acumen, EKG, Troponin, and serial changes to determine if it is an Acute Myocardial Infarction or myocardial injury due to an underlying chronic condition.        COVID-19 AND FLU A/B, NP SWAB IN TRANSPORT MEDIA 1 HR TAT - Normal    Narrative:     Fact sheet for providers: https://www.fda.gov/media/957382/download    Fact sheet for patients: https://www.fda.gov/media/696468/download    Test performed by PCR.   LACTIC ACID, PLASMA - Normal   PROCALCITONIN - Normal    Narrative:     As a Marker for Sepsis (Non-Neonates):    1. <0.5 ng/mL represents a low risk of severe sepsis and/or septic shock.  2. >2 ng/mL represents a high risk of severe sepsis and/or septic shock.    As a Marker for Lower Respiratory Tract Infections that require antibiotic therapy:    PCT on Admission    Antibiotic Therapy       6-12 Hrs later    >0.5                Strongly Recommended  >0.25 - <0.5        Recommended   0.1 - 0.25          Discouraged              Remeasure/reassess PCT  <0.1                Strongly Discouraged     Remeasure/reassess PCT    As 28 day mortality risk marker: \"Change in Procalcitonin Result\" (>80% or <=80%) if Day 0 (or Day 1) and Day 4 values are available. Refer to http://www.BESOS-pct-calculator.com    Change in PCT <=80%  A decrease of PCT levels below or equal to 80% defines a positive change in PCT test result representing a higher risk for 28-day all-cause mortality of patients diagnosed with severe sepsis for septic shock.    Change in PCT >80%  A decrease of PCT levels of more than 80% defines a negative change in PCT result representing a lower risk for 28-day all-cause mortality of patients diagnosed with severe sepsis or septic shock.      COVID PRE-OP / PRE-PROCEDURE SCREENING ORDER (NO ISOLATION)    Narrative:     The following orders were created for panel order COVID PRE-OP / PRE-PROCEDURE SCREENING ORDER (NO ISOLATION) - Swab, Nasopharynx.  Procedure                 "               Abnormality         Status                     ---------                               -----------         ------                     COVID-19 and FLU A/B PCR...[[id number]]  Normal              Final result                 Please view results for these tests on the individual orders.   BLOOD CULTURE   BLOOD CULTURE   RAINBOW DRAW    Narrative:     The following orders were created for panel order [place] Draw.  Procedure                               Abnormality         Status                     ---------                               -----------         ------                     Green Top (Gel)[[id number]]                                  Final result               Lavender Top[[id number]]                                     Final result               Gold Top - SST[[id number]]                                   Final result               Man Top[[id number]]                                         Final result               Light Blue Top[[id number]]                                   Final result                 Please view results for these tests on the individual orders.   BLOOD GAS, ARTERIAL   CBC AND DIFFERENTIAL    Narrative:     The following orders were created for panel order CBC & Differential.  Procedure                               Abnormality         Status                     ---------                               -----------         ------                     CBC Auto Differential[[id number]]        Abnormal            Final result               Scan Slide[[id number]]                                                                    Please view results for these tests on the individual orders.   GREEN TOP   LAVENDER TOP   GOLD TOP - SST   GRAY TOP   LIGHT BLUE TOP       Meds Given in ED:   Medications   sodium chloride 0.9 % flush 10 mL (has no administration in time range)   furosemide (LASIX) injection 80 mg (80 mg Intravenous Given 2/6/25 2666)   cefTRIAXone (ROCEPHIN) 1,000 mg in  sodium chloride 0.9 % 100 mL MBP (0 mg Intravenous Stopped 2/7/25 0108)   doxycycline (MONODOX) capsule 100 mg (100 mg Oral Given 2/6/25 2348)           Last NIH score:                                                          Dysphagia screening results:  Patient Factors Component (Dysphagia:Stroke or Rule-out)  Best Eye Response: 4-->(E4) spontaneous (02/06/25 2314)  Best Motor Response: 6-->(M6) obeys commands (02/06/25 2314)  Best Verbal Response: 5-->(V5) oriented (02/06/25 2314)  [place] Coma Scale Score: 15 (02/06/25 2314)     Joyce Coma Scale:  No data recorded     CIWA:        Restraint Type:            Isolation Status:  No active isolations

## 2025-02-07 NOTE — PLAN OF CARE
Goal Outcome Evaluation:  Plan of Care Reviewed With: patient, child        Progress: no change  Outcome Evaluation: PT initial eval completed. Pt presents below baseline with SOA, decreased endurance, and weakness. She ambulated 60' with CGA and no AD. Further IPPT is warrented. PT rec d/c home with initial 24/7 assist and HHPT when medically appropriate.    Anticipated Discharge Disposition (PT): home with 24/7 care, home with home health

## 2025-02-07 NOTE — CASE MANAGEMENT/SOCIAL WORK
Continued Stay Note  [facility]     Patient Name: Sylvia Jalloh  MRN: [id number]  Today's Date: 2/7/2025    Admit Date: 2/6/2025    Plan: TBD   Discharge Plan       Row Name 02/07/25 1501       Plan    Plan TBD    Plan Comments Met with Ms. Jalloh, at the bedside, to initiate discharge planning.    Ms. Jalloh lives alone in [place].    She states that prior to admission, she ambulated independently and was independent with her ADL's.  She does have a daughter and granddaughter that live nearby who assist her if needed with transport, meals, errands, shopping.  She has access to a rolling walker, but does not use.  She also has a shower chair.    The patient was at [facility], last July, for SNF rehab.  If rehab is recommended by therapy, Ms. Jalloh is agreeable to [facility] again.  She states she has not used home health in the past.    PCP is Bart Pop.  Insurance is [employer].  No ACP documents.  DC plan is TBD.    CM will continue to follow to assist with discharge needs.    Final Discharge Disposition Code 01 - home or self-care                  Expected Discharge Date and Time       Expected Discharge Date Expected Discharge Time    Feb 10, 2025             Leonarda Mack RN

## 2025-02-08 ENCOUNTER — READMISSION MANAGEMENT (OUTPATIENT)
Dept: CALL CENTER | Facility: HOSPITAL | Age: OVER 89
End: 2025-02-08
Payer: MEDICARE

## 2025-02-08 VITALS
HEIGHT: 62 IN | SYSTOLIC BLOOD PRESSURE: 150 MMHG | RESPIRATION RATE: 18 BRPM | TEMPERATURE: 97.9 F | HEART RATE: 63 BPM | DIASTOLIC BLOOD PRESSURE: 62 MMHG | OXYGEN SATURATION: 94 % | BODY MASS INDEX: 20.24 KG/M2 | WEIGHT: 110 LBS

## 2025-02-08 PROBLEM — J96.01 ACUTE RESPIRATORY FAILURE WITH HYPOXIA: Status: RESOLVED | Noted: 2017-09-06 | Resolved: 2025-02-08

## 2025-02-08 PROCEDURE — 99239 HOSP IP/OBS DSCHRG MGMT >30: CPT | Performed by: PEDIATRICS

## 2025-02-08 RX ORDER — SPIRONOLACTONE 25 MG/1
12.5 TABLET ORAL DAILY
Start: 2025-02-08

## 2025-02-08 RX ADMIN — ASPIRIN 81 MG: 81 TABLET, COATED ORAL at 08:18

## 2025-02-08 RX ADMIN — LEVOTHYROXINE SODIUM 75 MCG: 0.07 TABLET ORAL at 06:40

## 2025-02-08 RX ADMIN — APIXABAN 2.5 MG: 2.5 TABLET, FILM COATED ORAL at 08:18

## 2025-02-08 RX ADMIN — SACUBITRIL AND VALSARTAN 1 TABLET: 49; 51 TABLET, FILM COATED ORAL at 08:17

## 2025-02-08 RX ADMIN — Medication 10 ML: at 08:18

## 2025-02-08 RX ADMIN — FAMOTIDINE 40 MG: 20 TABLET, FILM COATED ORAL at 08:18

## 2025-02-08 RX ADMIN — ATORVASTATIN CALCIUM 80 MG: 40 TABLET, FILM COATED ORAL at 08:18

## 2025-02-08 NOTE — DISCHARGE SUMMARY
[facility] Medicine Services  DISCHARGE SUMMARY    Patient Name: Sylvia Jalloh  : 1934  MRN: [id number]    Date of Admission: 2025 10:40 PM  Date of Discharge:  2025  Primary Care Physician: Bart Pop APRN    Consults       No orders found from 2025 to 2025.            Hospital Course     Presenting Problem: SOB    Active Hospital Problems    Diagnosis  POA    **Acute on chronic systolic CHF (congestive heart failure) [I50.23]  Yes    PAF (paroxysmal atrial fibrillation) [I48.0]  Yes    History of cardioembolic cerebrovascular accident (CVA) [Z86.73]  Not Applicable    Mixed hyperlipidemia [E78.2]  Yes    Iron deficiency anemia [D50.9]  Yes    CKD stage 3b, GFR 30-44 ml/min [N18.32]  Yes    Hypothyroidism [E03.9]  Yes    Primary hypertension [I10]  Yes      Resolved Hospital Problems    Diagnosis Date Resolved POA    Acute respiratory failure with hypoxia [J96.01] 2025 Yes          Hospital Course:  Sylvia Jalloh is a 89 y.o. female with diastolic heart failure, A-fib, chronic kidney disease and history of CVA presents with shortness of breath with concern for a CHF exacerbation.    Chronic diastolic CHF  Acute respiratory failure with hypoxemia  - Acute decompensation  - CXR revealed findings of pulmonary edema  -Repeat echo with EF of 60% with mild concentric hypertrophy.  She has mild aortic valve stenosis, mild to moderate mitral valve regurg and worsening tricuspid regurgitation.  Increased right ventricular systolic pressure.  - Continue GDMT as tolerated  -Restart Farxiga at discharge, restart her home Lasix.  PCP had decreased the Aldactone and stop the Farxiga prior to admission which may have been one of the inciting reasons for her exacerbation.      CKD 3b  - BUN/Cr. 33/1.67 (baseline 1.5-1.6)  - Avoid nephrotoxins, monitor renal function     SELINA  - Chronic, no evidence of acute bleeding, HD stable  - Hgb 10     HTN  -  Chronic, controlled  - Resume home amlodipine, coreg     Hypothyroidism  - Chronic  - C/w Synthroid     PAF  - Chronic, rate-controlled in SR  - C/w Eliquis, coreg  -Discussed Vaseline and hydration of nadirs and to avoid picking to prevent epistaxis     History of CVA  - Chronic, no residual deficits  - C/w aspirin, statin     GERD  - Chronic  - C/w Pepcid     HLD  - Chronic  - C/w statin    Discharge Follow Up Recommendations for outpatient labs/diagnostics:     Patient to follow-up in heart valve clinic and 1 week for follow-up    Patient to keep appointment with neurology to follow-up on her stroke that was previously scheduled in January and 2 weeks  Day of Discharge     HPI:   Patient weaned to room air overnight.  Able to get up and walk around the room without any desaturations.  She is having some intermittent diplopia which she states is normal for her.  She did not sleep well last night.    Vital Signs:   Temp:  [97.5 °F (36.4 °C)-98.2 °F (36.8 °C)] 97.9 °F (36.6 °C)  Heart Rate:  [60-91] 63  Resp:  [16-18] 18  BP: (130-161)/(52-65) 150/62  Flow (L/min) (Oxygen Therapy):  [1-2] 2      Physical Exam:  Constitutional: No acute distress, awake, alert  HENT: NCAT, mucous membranes moist  Respiratory: rales at bases bilaterally but better than yesterday, respiratory effort normal   Cardiovascular: RRR, 3/6 holosystolic murmur loudest at RUSB, no rubs, or gallops  Gastrointestinal: Positive bowel sounds, soft, nontender, nondistended  Musculoskeletal: No bilateral ankle edema  Psychiatric: Appropriate affect, cooperative  Neurologic: Oriented x 3, strength symmetric in all extremities, Cranial Nerves grossly intact to confrontation, speech clear  Skin: No rashes      Pertinent  and/or Most Recent Results     LAB RESULTS:      Lab 02/07/25  1126 02/06/25  2247   WBC 8.70 15.61*   HEMOGLOBIN 9.4* 10.0*   HEMATOCRIT 29.4* 30.6*   PLATELETS 141 148   NEUTROS ABS 8.18* 12.94*   IMMATURE GRANS (ABS)  --  0.07*    LYMPHS ABS  --  1.21   MONOS ABS  --  1.28*   EOS ABS 0.00 0.04   MCV 92.5 92.2   PROCALCITONIN  --  0.12   LACTATE  --  1.7         Lab 02/07/25  1126 02/06/25  2247   SODIUM 141 144   POTASSIUM 4.1 4.0   CHLORIDE 106 109*   CO2 22.0 20.0*   ANION GAP 13.0 15.0   BUN 36* 33*   CREATININE 1.67* 1.67*   EGFR 29.0* 29.0*   GLUCOSE 200* 188*   CALCIUM 9.1 8.9   MAGNESIUM 2.0  --    PHOSPHORUS 3.5  --          Lab 02/07/25  1126 02/06/25  2247   TOTAL PROTEIN 6.7 6.9   ALBUMIN 3.8 3.9   GLOBULIN 2.9 3.0   ALT (SGPT) 16 20   AST (SGOT) 25 34*   BILIRUBIN 1.0 1.5*   ALK PHOS 245* 281*         Lab 02/06/25  2350 02/06/25  2247   PROBNP  --  4,769.0*   HSTROP T 58* 63*             Lab 02/07/25  1126   IRON 22*   IRON SATURATION (TSAT) 5*   TIBC 429   TRANSFERRIN 288   FERRITIN 56.55   FOLATE >20.00   VITAMIN B 12 773         Lab 02/06/25  2308   PH, ARTERIAL 7.448   PCO2, ARTERIAL 31.7*   PO2 ART 70.9*   FIO2 50   HCO3 ART 21.9   BASE EXCESS ART -1.6*   CARBOXYHEMOGLOBIN 1.7     Brief Urine Lab Results  (Last result in the past 365 days)        Color   Clarity   Blood   Leuk Est   Nitrite   Protein   CREAT   Urine HCG        07/22/24 1146 Yellow   Clear   Trace   Moderate (2+)   Negative   Negative                 Microbiology Results (last 10 days)       Procedure Component Value - Date/Time    Respiratory Culture - Sputum, Cough [[id number]] Collected: 02/07/25 1816    Lab Status: Preliminary result Specimen: Sputum from Cough Updated: 02/08/25 1157     Respiratory Culture Light growth (2+) The culture consists of normal respiratory cassandra. This is a preliminary report; final report to follow.     Gram Stain Few (2+) WBCs per low power field      Rare (1+) Epithelial cells per low power field      Few (2+) Gram negative bacilli      Few (2+) Gram positive cocci    S. Pneumo Ag Urine or CSF - Urine, Urine, Clean Catch [[id number]]  (Normal) Collected: 02/07/25 0544    Lab Status: Final result Specimen: Urine, Clean Catch  Updated: 02/07/25 1009     Strep Pneumo Ag Negative    MRSA Screen, PCR (Inpatient) - Swab, Nares [[id number]]  (Normal) Collected: 02/07/25 0253    Lab Status: Final result Specimen: Swab from Nares Updated: 02/07/25 0841     MRSA PCR Negative    Narrative:      The negative predictive value of this diagnostic test is high and should only be used to consider de-escalating anti-MRSA therapy. A positive result may indicate colonization with MRSA and must be correlated clinically.  MRSA Negative    Blood Culture - Blood, Arm, Left [[id number]]  (Normal) Collected: 02/06/25 2259    Lab Status: Preliminary result Specimen: Blood from Arm, Left Updated: 02/08/25 0345     Blood Culture No growth at 24 hours    Blood Culture - Blood, Arm, Left [[id number]]  (Normal) Collected: 02/06/25 2258    Lab Status: Preliminary result Specimen: Blood from Arm, Left Updated: 02/08/25 0345     Blood Culture No growth at 24 hours    COVID PRE-OP / PRE-PROCEDURE SCREENING ORDER (NO ISOLATION) - Swab, Nasopharynx [[id number]]  (Normal) Collected: 02/06/25 2251    Lab Status: Final result Specimen: Swab from Nasopharynx Updated: 02/06/25 2343    Narrative:      The following orders were created for panel order COVID PRE-OP / PRE-PROCEDURE SCREENING ORDER (NO ISOLATION) - Swab, Nasopharynx.  Procedure                               Abnormality         Status                     ---------                               -----------         ------                     COVID-19 and FLU A/B PCR...[[id number]]  Normal              Final result                 Please view results for these tests on the individual orders.    COVID-19 and FLU A/B PCR, 1 HR TAT - Swab, Nasopharynx [[id number]]  (Normal) Collected: 02/06/25 2251    Lab Status: Final result Specimen: Swab from Nasopharynx Updated: 02/06/25 2343     COVID19 Not Detected     Influenza A PCR Not Detected     Influenza B PCR Not Detected    Narrative:      Fact sheet for providers:  https://www.fda.gov/media/333215/download    Fact sheet for patients: https://www.fda.gov/media/969631/download    Test performed by PCR.            XR Chest 1 View    Result Date: 2/6/2025  XR CHEST 1 VW Date of Exam: 2/6/2025 10:45 PM EST Indication: SOA triage protocol Comparison: 7/22/2024 Findings: Patient is rotated to the right. There is central pulmonary vascular congestion. Interstitial thickening which suggest pulmonary edema. Bibasilar airspace disease may relate to atelectasis and/or pneumonia with small bilateral pleural effusions. No pneumothorax. Osseous structures grossly intact.     Impression: 1. Central pulmonary vascular congestion with interstitial thickening suggesting pulmonary edema. 2. Bibasilar airspace disease may relate to atelectasis and/or pneumonia with small bilateral pleural effusions. Electronically Signed: Jermaine Colby MD  2/6/2025 11:07 PM EST  Workstation ID: [id number]     Results for orders placed during the hospital encounter of 09/21/17    Duplex Renal Artery - Bilateral Complete CAR    Interpretation Summary  EXAMINATION: BILATERAL DUPLEX RENAL ULTRASOUND - 09/23/2017    HISTORY: Hypertension.    FINDINGS: Kidneys are within normal limits in size, both approximately 9  cm. Images obtained show grossly normal-appearing renal cortex and no  hydronephrosis. Peak systolic renal artery velocities are within normal  limits bilaterally, 150 cm/s or less for the left renal artery and  maximum of 193 cm/s for the mid right renal artery. Renal arteries are  noted to be tortuous, not unusual for patient's age. Resistive index for  the right kidney is upper normal, 0.74, but clearly elevated for the  left kidney at 0.83 indicating some renal parenchymal disease may be  present. Renal aortic ratios are calculated as 2.5 on the right and 2.2  on the left, not significantly increased. Renal veins appear patent.  Incidental note is made of elevated peak systolic velocities in the  celiac axis  of 335 cm/s.    Impression  1. No evidence of hemodynamically significant renal artery stenosis.  2. Elevated resistive indices of the left kidney suggesting some  parenchymal disease may be present.    DICTATED:     09/24/2017  EDITED:         09/24/2017    This report was finalized on 9/25/2017 9:38 AM by DR. Arpit Trevino MD.      Results for orders placed during the hospital encounter of 09/21/17    Duplex Renal Artery - Bilateral Complete CAR    Interpretation Summary  EXAMINATION: BILATERAL DUPLEX RENAL ULTRASOUND - 09/23/2017    HISTORY: Hypertension.    FINDINGS: Kidneys are within normal limits in size, both approximately 9  cm. Images obtained show grossly normal-appearing renal cortex and no  hydronephrosis. Peak systolic renal artery velocities are within normal  limits bilaterally, 150 cm/s or less for the left renal artery and  maximum of 193 cm/s for the mid right renal artery. Renal arteries are  noted to be tortuous, not unusual for patient's age. Resistive index for  the right kidney is upper normal, 0.74, but clearly elevated for the  left kidney at 0.83 indicating some renal parenchymal disease may be  present. Renal aortic ratios are calculated as 2.5 on the right and 2.2  on the left, not significantly increased. Renal veins appear patent.  Incidental note is made of elevated peak systolic velocities in the  celiac axis of 335 cm/s.    Impression  1. No evidence of hemodynamically significant renal artery stenosis.  2. Elevated resistive indices of the left kidney suggesting some  parenchymal disease may be present.    DICTATED:     09/24/2017  EDITED:         09/24/2017    This report was finalized on 9/25/2017 9:38 AM by DR. Arpit Trevino MD.      Results for orders placed during the hospital encounter of 02/06/25    Adult Transthoracic Echo Complete With Contrast if Necessary Per Protocol    Interpretation Summary    Left ventricular systolic function is normal. Estimated left ventricular EF =  60%    Left ventricular wall thickness is consistent with mild concentric hypertrophy.    Mild aortic valve stenosis is present.    Aortic valve maximum pressure gradient is 26 mmHg. Aortic valve mean pressure gradient is 14 mmHg.    Mild to moderate mitral valve regurgitation is present.    Estimated right ventricular systolic pressure from tricuspid regurgitation is 46 mmHg.      Plan for Follow-up of Pending Labs/Results:We will call with abnormal results.    Pending Labs       Order Current Status    Mycoplasma Pneumoniae Antibody, IgM - Blood, In process    Blood Culture - Blood, Arm, Left Preliminary result    Blood Culture - Blood, Arm, Left Preliminary result    Respiratory Culture - Sputum, Cough Preliminary result          Discharge Details        Discharge Medications        New Medications        Instructions Start Date   aspirin 81 MG EC tablet   81 mg, Oral, Daily      Farxiga 5 MG tablet tablet  Generic drug: dapagliflozin   5 mg, Oral, Daily             Changes to Medications        Instructions Start Date   spironolactone 25 MG tablet  Commonly known as: ALDACTONE  What changed: how much to take   12.5 mg, Oral, Daily             Continue These Medications        Instructions Start Date   acetaminophen 325 MG tablet  Commonly known as: TYLENOL   650 mg, Every 6 Hours PRN      amLODIPine 2.5 MG tablet  Commonly known as: NORVASC   2.5 mg, Oral, Daily, Per patient, take two tabs daily      apixaban 2.5 MG tablet tablet  Commonly known as: ELIQUIS   2.5 mg, Oral, 2 Times Daily      atorvastatin 80 MG tablet  Commonly known as: LIPITOR   80 mg, Daily      carvedilol 12.5 MG tablet  Commonly known as: COREG   12.5 mg, 2 Times Daily With Meals      Claritin 10 MG tablet  Generic drug: loratadine   10 mg, Daily      Entresto 49-51 MG tablet  Generic drug: sacubitril-valsartan   1 tablet, Oral      famotidine 40 MG tablet  Commonly known as: PEPCID   40 mg, Daily      furosemide 20 MG tablet  Commonly known  as: LASIX   20 mg, Daily      hydrOXYzine 25 MG tablet  Commonly known as: ATARAX   25 mg, Oral, 3 Times Daily PRN      levothyroxine 75 MCG tablet  Commonly known as: SYNTHROID, LEVOTHROID   75 mcg, Daily      meclizine 25 MG tablet  Commonly known as: ANTIVERT   25 mg, Oral, Every 6 Hours PRN      Multivitamin Adults 50+ tablet tablet  Generic drug: multivitamin with minerals   1 tablet, Daily      ondansetron ODT 4 MG disintegrating tablet  Commonly known as: ZOFRAN-ODT   4 mg, Oral, Every 8 Hours PRN      Vitamin D (Cholecalciferol) 25 MCG (1000 UT) capsule   1,000 Units, Oral, Daily             Stop These Medications      cetirizine 10 MG tablet  Commonly known as: zyrTEC              Allergies   Allergen Reactions    Ace Inhibitors Rash    Latex Rash    Amoxicillin-Pot Clavulanate Nausea And Vomiting         Discharge Disposition:  Home or Self Care    Diet:  Hospital:  Diet Order   Procedures    Diet: Cardiac, Fluid Restriction (240 mL/tray); Low Sodium (2g); 1500 mL/day; Fluid Consistency: Thin (IDDSI 0)       Diet Instructions       Diet: Cardiac Diets; Low Sodium (2g)      Discharge Diet: Cardiac Diets    Cardiac Diet: Low Sodium (2g)    Texture: Regular Texture (IDDSI 7)    Fluid Consistency: Thin (IDDSI 0)             Activity:  Activity Instructions       Measure Weight Daily      Instruct patient on daily weights            Restrictions or Other Recommendations:  As tolerated       CODE STATUS:    Code Status and Medical Interventions: CPR (Attempt to Resuscitate); Full Support   Ordered at: 02/07/25 0235     Code Status (Patient has no pulse and is not breathing):    CPR (Attempt to Resuscitate)     Medical Interventions (Patient has pulse or is breathing):    Full Support       Future Appointments   Date Time Provider Department Center   2/20/2025  2:30 PM Thea Brewer APRN [facility]       Additional Instructions for the Follow-ups that You Need to Schedule       Discharge Follow-up with  Specialty: Cardiology; 1 Week   As directed      Specialty: Cardiology   Follow Up: 1 Week   Follow Up Details: [facility]ve Clinic 1 week                      Reina Solomon MD  02/08/25      Time Spent on Discharge:  I spent  36  minutes on this discharge activity which included: face-to-face encounter with the patient, reviewing the data in the system, coordination of the care with the nursing staff as well as consultants, documentation, and entering orders.

## 2025-02-08 NOTE — PLAN OF CARE
Goal Outcome Evaluation:  Plan of Care Reviewed With: patient, family VSS pt now on room air at baseline denies pain at baseline for mobility daughter bedside able to go home have updated MD of POC to dc home .

## 2025-02-08 NOTE — PLAN OF CARE
Goal Outcome Evaluation:  Plan of Care Reviewed With: patient        Progress: improving        Pt is alert and oriented X 4. VSS. 1 to 2 L NC. Slept throughout the night. No c/o shortness of breath. Voiding well.

## 2025-02-09 LAB
BACTERIA SPEC RESP CULT: NORMAL
GRAM STN SPEC: NORMAL

## 2025-02-09 NOTE — OUTREACH NOTE
Prep Survey      Flowsheet Row Responses   [facility] discharged from? [place]   Is LACE score < 7 ? No   Eligibility Readm Mgmt   Discharge diagnosis Acute on chronic systolic CHF (congestive heart failure)   Does the patient have one of the following disease processes/diagnoses(primary or secondary)? CHF   Prep survey completed? Yes            Anjana FARAH - Registered Nurse

## 2025-02-10 LAB — M PNEUMO IGM SER IA-ACNC: <770 U/ML (ref 0–769)

## 2025-02-11 LAB
QT INTERVAL: 414 MS
QTC INTERVAL: 523 MS

## 2025-02-12 ENCOUNTER — READMISSION MANAGEMENT (OUTPATIENT)
Dept: CALL CENTER | Facility: HOSPITAL | Age: OVER 89
End: 2025-02-12
Payer: MEDICARE

## 2025-02-12 LAB
BACTERIA SPEC AEROBE CULT: NORMAL
BACTERIA SPEC AEROBE CULT: NORMAL

## 2025-02-12 NOTE — OUTREACH NOTE
CHF Week 1 Survey      Flowsheet Row Responses   [facility] patient discharged from? [place]   Does the patient have one of the following disease processes/diagnoses(primary or secondary)? CHF   CHF Week 1 attempt successful? Yes   Call start time 1424   Call end time 1430   Discharge diagnosis Acute on chronic systolic CHF (congestive heart failure)   Person spoke with today (if not patient) and relationship Patient   Medication alerts for this patient Aspirin, Farxiga   Meds reviewed with patient/caregiver? Yes   Is the patient having any side effects they believe may be caused by any medication additions or changes? No   Does the patient have all medications ordered at discharge? Yes   Prescription comments No questions or concerns with medications.   Is the patient taking all medications as directed (includes completed medication regime)? Yes   Comments regarding appointments Neurology f/u appt on 2/20/25 at 2:30 PM with LOREN Sterling.   Does the patient have a primary care provider?  Yes   Does the patient have an appointment with their PCP within 7 days of discharge? Yes   Comments regarding PCP PCP--LOREN Haile.   Has the patient kept scheduled appointments due by today? N/A   Comments Advised patient to call Cardiology for hospital f/u appt.   Has home health visited the patient within 72 hours of discharge? N/A   Pulse Ox monitoring None   Psychosocial issues? No   Comments Patient states her family is now sick with flu and pneumonia. They are isolating away from patient. Advised patient to weigh daily and log. Advised if she gained 3 lbs overnight or 5 lbs in a week that she should contact her physician. Patient denies any swelling or shortness of breath. She states she has been up and about her home.   Did the patient receive a copy of their discharge instructions? Yes   Nursing interventions Reviewed instructions with patient   What is the patient's perception of their health  status since discharge? Improving   Nursing interventions Nurse provided patient education   Is the patient able to teach back signs and symptoms of worsening condition? (i.e. weight gain, shortness of air, etc.) Yes   If the patient is a current smoker, are they able to teach back resources for cessation? Not a smoker   Is the patient/caregiver able to teach back the hierarchy of who to call/visit for symptoms/problems? PCP, Specialist, Home health nurse, Urgent Care, ED, 911 Yes   CHF Zone this Call Green Zone   Green Zone Patient reports doing well, No new or worsening shortness of breath, Physical activity level is normal for you, No new swelling -  feet, ankles and legs look normal for you, No chest pain   Green Zone Interventions Daily weight check, Meds as directed, Low sodium diet, Follow up visits planned    CHF Week 1 call completed? Yes   Is the patient interested in additional calls from an ambulatory ? No   Would this patient benefit from a Referral to [facility] Social Work? No   Wrap up additional comments Patient denies any questions or needs at this time.   Call end time 1430            Kristen RAMIREZ - Registered Nurse

## 2025-02-21 ENCOUNTER — READMISSION MANAGEMENT (OUTPATIENT)
Dept: CALL CENTER | Facility: HOSPITAL | Age: OVER 89
End: 2025-02-21
Payer: MEDICARE

## 2025-02-21 NOTE — OUTREACH NOTE
CHF Week 2 Survey      Flowsheet Row Responses   [facility] [place] patient discharged from? [place]   Does the patient have one of the following disease processes/diagnoses(primary or secondary)? CHF   Week 2 attempt successful? Yes   Call start time 1759   Call end time 1804   Discharge diagnosis Acute on chronic systolic CHF (congestive heart failure)   Person spoke with today (if not patient) and relationship Patient   Meds reviewed with patient/caregiver? Yes   Is the patient having any side effects they believe may be caused by any medication additions or changes? No   Does the patient have all medications ordered at discharge? Yes   Is the patient taking all medications as directed (includes completed medication regime)? Yes   Does the patient have a primary care provider?  Yes   Does the patient have an appointment with their PCP within 7 days of discharge? Yes   Has the patient kept scheduled appointments due by today? N/A   Psychosocial issues? No   What is the patient's perception of their health status since discharge? Improving   Nursing interventions Nurse provided patient education   Is the patient able to teach back signs and symptoms of worsening condition? (i.e. weight gain, shortness of air, etc.) Yes   If the patient is a current smoker, are they able to teach back resources for cessation? Not a smoker   Is the patient/caregiver able to teach back the hierarchy of who to call/visit for symptoms/problems? PCP, Specialist, Home health nurse, Urgent Care, ED, 911 Yes   Is the patient able to teach back Heart Failure Zones? No   CHF Zone this Call Green Zone   Green Zone No new or worsening shortness of breath, No new swelling -  feet, ankles and legs look normal for you, No chest pain   Green Zone Interventions Meds as directed, Follow up visits planned   CHF Week 2 call completed? Yes   Is the patient interested in additional calls from an ambulatory ? No   Would this patient benefit  from a Referral to [facility] Social Work? No   Wrap up additional comments Pt denies SOB. Pt fett like she was coming down with something last night as she had a cough/sniffles. Pt shares she is feeling better today.   Call end time 1804            Jayashree COLLIER - Registered Nurse

## 2025-08-19 ENCOUNTER — APPOINTMENT (OUTPATIENT)
Facility: HOSPITAL | Age: OVER 89
End: 2025-08-19
Payer: MEDICARE

## 2025-08-19 ENCOUNTER — HOSPITAL ENCOUNTER (EMERGENCY)
Facility: HOSPITAL | Age: OVER 89
Discharge: HOME | End: 2025-08-20
Attending: STUDENT IN AN ORGANIZED HEALTH CARE EDUCATION/TRAINING PROGRAM
Payer: MEDICARE

## (undated) DEVICE — MODEL BT2000 P/N 700287-012KIT CONTENTS: MANIFOLD WITH SALINE AND CONTRAST PORTS, SALINE TUBING WITH SPIKE AND HAND SYRINGE, TRANSDUCER: Brand: BT2000 AUTOMATED MANIFOLD KIT

## (undated) DEVICE — GLIDESHEATH SLENDER STAINLESS STEEL KIT: Brand: GLIDESHEATH SLENDER

## (undated) DEVICE — PK CATH CARD 10

## (undated) DEVICE — A2000 MULTI-USE SYRINGE KIT, P/N 701277-003KIT CONTENTS: 100ML CONTRAST RESERVOIR AND TUBING WITH CONTRAST SPIKE AND CLAMP: Brand: A2000 MULTI-USE SYRINGE KIT

## (undated) DEVICE — DEV COMP RAD PRELUDESYNC 24CM

## (undated) DEVICE — GW INQWIRE FC PTFE STD J/1.5 .035 260

## (undated) DEVICE — CATH DIAG EXPO M/ PK 6FR FL4/FR4 PIG 3PK

## (undated) DEVICE — MODEL AT P54, P/N 700608-035KIT CONTENTS: HAND CONTROLLER, 3-WAY HIGH-PRESSURE STOPCOCK WITH ROTATING END AND PREMIUM HIGH-PRESSURE TUBING: Brand: ANGIOTOUCH® KIT

## (undated) DEVICE — CATH DIAG EXPO .056 FL3.5 6F 100CM